# Patient Record
Sex: FEMALE | URBAN - METROPOLITAN AREA
[De-identification: names, ages, dates, MRNs, and addresses within clinical notes are randomized per-mention and may not be internally consistent; named-entity substitution may affect disease eponyms.]

---

## 2021-04-15 ENCOUNTER — APPOINTMENT (RX ONLY)
Dept: URBAN - METROPOLITAN AREA CLINIC 115 | Facility: CLINIC | Age: 51
Setting detail: DERMATOLOGY
End: 2021-04-15

## 2021-04-15 VITALS — TEMPERATURE: 98.1 F

## 2021-04-15 DIAGNOSIS — Z41.9 ENCOUNTER FOR PROCEDURE FOR PURPOSES OTHER THAN REMEDYING HEALTH STATE, UNSPECIFIED: ICD-10-CM

## 2021-04-15 PROCEDURE — ? PHOTO-DOCUMENTATION

## 2021-04-15 PROCEDURE — ? DYSPORT

## 2021-04-15 PROCEDURE — ? COSMETIC CONSULTATION: DYSPORT

## 2021-05-06 ENCOUNTER — APPOINTMENT (RX ONLY)
Dept: URBAN - METROPOLITAN AREA CLINIC 106 | Facility: CLINIC | Age: 51
Setting detail: DERMATOLOGY
End: 2021-05-06

## 2021-05-06 DIAGNOSIS — Z41.9 ENCOUNTER FOR PROCEDURE FOR PURPOSES OTHER THAN REMEDYING HEALTH STATE, UNSPECIFIED: ICD-10-CM

## 2021-05-06 PROCEDURE — ? PHOTO-DOCUMENTATION

## 2021-05-06 PROCEDURE — ? DYSPORT

## 2021-05-06 NOTE — PROCEDURE: DYSPORT
Left Periorbital Units: 0
Show Masseter Units: Yes
Show Ucl Units: No
Consent: Written consent obtained. Risks include but not limited to lid/brow ptosis, bruising, swelling, diplopia, temporary effect, incomplete chemical denervation.
Glabellar Complex Units: 3
Post-Care Instructions: Patient instructed to not lie down for 4 hours and limit physical activity for 24 hours.
Expiration Date (Month Year): 08/31/2021
Detail Level: Detailed
Lot #: [id number]
Dilution (U/ 0.1cc): 12
Forehead Units: 4

## 2022-11-07 ENCOUNTER — OFFICE VISIT (OUTPATIENT)
Dept: URGENT CARE | Facility: CLINIC | Age: 52
End: 2022-11-07
Payer: COMMERCIAL

## 2022-11-07 VITALS
SYSTOLIC BLOOD PRESSURE: 120 MMHG | BODY MASS INDEX: 21.62 KG/M2 | DIASTOLIC BLOOD PRESSURE: 78 MMHG | OXYGEN SATURATION: 98 % | WEIGHT: 122 LBS | HEART RATE: 83 BPM | HEIGHT: 63 IN | TEMPERATURE: 98 F | RESPIRATION RATE: 14 BRPM

## 2022-11-07 DIAGNOSIS — J01.00 ACUTE MAXILLARY SINUSITIS, RECURRENCE NOT SPECIFIED: Primary | ICD-10-CM

## 2022-11-07 DIAGNOSIS — J02.9 SORE THROAT: ICD-10-CM

## 2022-11-07 LAB
CTP QC/QA: YES
MOLECULAR STREP A: NEGATIVE

## 2022-11-07 PROCEDURE — 3078F DIAST BP <80 MM HG: CPT | Mod: CPTII,S$GLB,,

## 2022-11-07 PROCEDURE — 1160F PR REVIEW ALL MEDS BY PRESCRIBER/CLIN PHARMACIST DOCUMENTED: ICD-10-PCS | Mod: CPTII,S$GLB,,

## 2022-11-07 PROCEDURE — 1160F RVW MEDS BY RX/DR IN RCRD: CPT | Mod: CPTII,S$GLB,,

## 2022-11-07 PROCEDURE — 3008F PR BODY MASS INDEX (BMI) DOCUMENTED: ICD-10-PCS | Mod: CPTII,S$GLB,,

## 2022-11-07 PROCEDURE — 99203 OFFICE O/P NEW LOW 30 MIN: CPT | Mod: S$GLB,,,

## 2022-11-07 PROCEDURE — 1159F MED LIST DOCD IN RCRD: CPT | Mod: CPTII,S$GLB,,

## 2022-11-07 PROCEDURE — 87651 STREP A DNA AMP PROBE: CPT | Mod: QW,S$GLB,,

## 2022-11-07 PROCEDURE — 1159F PR MEDICATION LIST DOCUMENTED IN MEDICAL RECORD: ICD-10-PCS | Mod: CPTII,S$GLB,,

## 2022-11-07 PROCEDURE — 99203 PR OFFICE/OUTPT VISIT, NEW, LEVL III, 30-44 MIN: ICD-10-PCS | Mod: S$GLB,,,

## 2022-11-07 PROCEDURE — 3074F PR MOST RECENT SYSTOLIC BLOOD PRESSURE < 130 MM HG: ICD-10-PCS | Mod: CPTII,S$GLB,,

## 2022-11-07 PROCEDURE — 3074F SYST BP LT 130 MM HG: CPT | Mod: CPTII,S$GLB,,

## 2022-11-07 PROCEDURE — 3078F PR MOST RECENT DIASTOLIC BLOOD PRESSURE < 80 MM HG: ICD-10-PCS | Mod: CPTII,S$GLB,,

## 2022-11-07 PROCEDURE — 3008F BODY MASS INDEX DOCD: CPT | Mod: CPTII,S$GLB,,

## 2022-11-07 PROCEDURE — 87651 POCT STREP A MOLECULAR: ICD-10-PCS | Mod: QW,S$GLB,,

## 2022-11-07 RX ORDER — ESTRADIOL 0.1 MG/D
FILM, EXTENDED RELEASE TRANSDERMAL
COMMUNITY
End: 2023-04-13

## 2022-11-07 RX ORDER — BUPROPION HYDROCHLORIDE 75 MG/1
75 TABLET ORAL 2 TIMES DAILY
COMMUNITY
End: 2023-03-03 | Stop reason: SDUPTHER

## 2022-11-07 RX ORDER — ALPRAZOLAM 0.25 MG/1
TABLET ORAL 3 TIMES DAILY
COMMUNITY
End: 2023-03-03 | Stop reason: SDUPTHER

## 2022-11-07 NOTE — PROGRESS NOTES
"Subjective:       Patient ID: Yanelis Lundy is a 52 y.o. female.    Vitals:  height is 5' 3" (1.6 m) and weight is 55.3 kg (122 lb). Her oral temperature is 97.9 °F (36.6 °C). Her blood pressure is 120/78 and her pulse is 83. Her respiration is 14 and oxygen saturation is 98%.     Chief Complaint: Sore Throat    Patient c/o sore throat, sinus pressure, and cough with yellowish and greenish mucus. Patient states that she has been having sinus pressure and a productive cough with clear production for a week. She also states have post nasal drainage as well. She states that yesterday she noticed that her mucus and cough has become greenish/yellow. She states that she developed a sore throat yesterday and has a history of strep throat and wanted to come in and get tested for strep. She states that she had a fever last night of 102. Patient states that she has been taking Tylenol and Mucinex for her fever, sore throat and her cough. Patient states having tightness within her muscles but mentioned that this is normal for her when she is sick. Patient denies CP, SOB, nausea, vomiting, and diarrhea.     Sore Throat   This is a new problem. The current episode started 1 to 4 weeks ago. The problem has been gradually worsening. The maximum temperature recorded prior to her arrival was 102 - 102.9 F. The pain is at a severity of 3/10. The pain is mild. Associated symptoms include congestion, coughing, diarrhea and trouble swallowing. Pertinent negatives include no abdominal pain, ear discharge, ear pain, headaches, hoarse voice, neck pain, shortness of breath, stridor or vomiting. Treatments tried: Tylenol. The treatment provided mild relief.     Constitution: Negative for chills, sweating and fever.   HENT:  Positive for congestion, postnasal drip, sinus pain, sinus pressure, sore throat and trouble swallowing. Negative for ear pain, ear discharge, hearing loss, facial swelling and nosebleeds.    Neck: Negative for neck " pain, neck stiffness and neck swelling.   Cardiovascular:  Negative for chest pain and sob on exertion.   Eyes:  Negative for eye itching, eye pain and eye redness.   Respiratory:  Positive for cough and sputum production (originally clear, now yellow/green). Negative for bloody sputum, COPD, shortness of breath, stridor and wheezing.    Gastrointestinal:  Positive for nausea and diarrhea. Negative for abdominal pain, vomiting and constipation.   Musculoskeletal:  Negative for muscle cramps and muscle ache.   Skin:  Negative for rash.   Allergic/Immunologic: Negative for sneezing.   Neurological:  Negative for dizziness, light-headedness and headaches.     Objective:      Physical Exam   Constitutional: She is oriented to person, place, and time. She appears well-developed. She is cooperative.  Non-toxic appearance. She does not appear ill. No distress.   HENT:   Head: Normocephalic and atraumatic.   Ears:   Right Ear: Hearing, tympanic membrane, external ear and ear canal normal.   Left Ear: Hearing, tympanic membrane, external ear and ear canal normal.   Nose: Sinus tenderness and congestion present. No mucosal edema, rhinorrhea, purulent discharge, nose lacerations, nasal deformity, septal deviation or nasal septal hematoma. No epistaxis. Right sinus exhibits no maxillary sinus tenderness and no frontal sinus tenderness. Left sinus exhibits no maxillary sinus tenderness and no frontal sinus tenderness.   Mouth/Throat: Uvula is midline, oropharynx is clear and moist and mucous membranes are normal. Mucous membranes are moist. No trismus in the jaw. Normal dentition. No uvula swelling. No oropharyngeal exudate or posterior oropharyngeal erythema. Oropharynx is clear.   Eyes: Conjunctivae and lids are normal. Pupils are equal, round, and reactive to light. Right eye exhibits no discharge. Left eye exhibits no discharge. No scleral icterus. Extraocular movement intact   Neck: Trachea normal and phonation normal. Neck  supple. No neck rigidity present.   Cardiovascular: Normal rate, regular rhythm, S1 normal, S2 normal, normal heart sounds and normal pulses.   No murmur heard.Exam reveals no gallop and no friction rub.   Pulmonary/Chest: Effort normal and breath sounds normal. No stridor. No respiratory distress. She has no wheezes. She has no rhonchi. She has no rales.   Abdominal: Normal appearance and bowel sounds are normal. She exhibits no distension and no mass. Soft. There is no abdominal tenderness.   Musculoskeletal: Normal range of motion.         General: No deformity. Normal range of motion.      Cervical back: She exhibits no tenderness.   Lymphadenopathy:     She has cervical adenopathy.   Neurological: She is alert and oriented to person, place, and time. She exhibits normal muscle tone. Coordination normal.   Skin: Skin is warm, dry, intact, not diaphoretic and not pale.   Psychiatric: Her speech is normal and behavior is normal. Judgment and thought content normal.   Nursing note and vitals reviewed.      Results for orders placed or performed in visit on 11/07/22   POCT Strep A, Molecular   Result Value Ref Range    Molecular Strep A, POC Negative Negative     Acceptable Yes        Assessment:       1. Acute maxillary sinusitis, recurrence not specified    2. Sore throat            Plan:     Previous external notes reviewed.  Vital signs reviewed.  Labs ordered. Labs reviewed.  Discussed sinusitis, home care, tx options, and given follow up precautions  Patient involved with the treatment plan and agreed to the plan.  Patient informed on warning signs, patient understood warning signs.  Patient informed to return to the urgent care or go to the ER if warning signs appear.    Patient Instructions   Please drink plenty of fluids.  Please get plenty of rest.  Please return here or go to the Emergency Department for any concerns or worsening of condition.  If you do not have Hypertension or any history  of palpitations, it is ok to take over the counter Sudafed or Mucinex D or Allegra-D or Claritin-D or Zyrtec-D.  If you do take one of the above, it is ok to combine that with plain over the counter Mucinex or Allegra or Claritin or Zyrtec.  If for example you are taking Zyrtec -D, you can combine that with Mucinex, but not Mucinex-D.  If you are taking Mucinex-D, you can combine that with plain Allegra or Claritin or Zyrtec.   If you do have Hypertension or palpitations, it is safe to take Coricidin HBP for relief of sinus symptoms.  We recommend you take over the counter Flonase (Fluticasone) or another nasally inhaled steroid unless you are already taking one.  Nasal irrigation with a saline spray or Netti Pot like device per their directions is also recommended.  If not allergic, please take over the counter Tylenol (Acetaminophen) and/or Motrin (Ibuprofen) as directed for control of pain and/or fever.  Please follow up with your primary care doctor or specialist as needed.    If you  smoke, please stop smoking.      Acute maxillary sinusitis, recurrence not specified    Sore throat  -     POCT Strep A, Molecular            Additional MDM:     Heart Failure Score:   COPD = No      Arlen Marmolejo PA-C

## 2022-11-07 NOTE — LETTER
November 7, 2022      [facility] [place] [address] [facility] [address]  Phone: [phone]  Fax: [phone]       Patient: Yanelis Lundy   YOB: 1970  Date of Visit: 11/07/2022    To Whom It May Concern:    DARCIE Lundy  was at [facility] on 11/07/2022. The patient may return to work/school on 11/11/2022 with no restrictions or sooner if fever (100.4) free for 24 hours without the use of fever reducing medications. If you have any questions or concerns, or if I can be of further assistance, please do not hesitate to contact me.    Sincerely,      Arlen Marmolejo PA-C

## 2023-01-10 ENCOUNTER — TELEPHONE (OUTPATIENT)
Dept: NEUROLOGY | Facility: CLINIC | Age: 53
End: 2023-01-10
Payer: COMMERCIAL

## 2023-01-10 DIAGNOSIS — F07.81 POST-CONCUSSION SYNDROME: Primary | ICD-10-CM

## 2023-01-10 NOTE — TELEPHONE ENCOUNTER
----- Message from Merlin Trevizo MA sent at 1/10/2023  9:40 AM CST -----  Regarding: RETURNING CALL  Contact: pt at   Pt  is  calling stated that she is returning  carrie's call for an appt . Please call pt at
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
## 2023-01-19 ENCOUNTER — PATIENT MESSAGE (OUTPATIENT)
Dept: OTOLARYNGOLOGY | Facility: CLINIC | Age: 53
End: 2023-01-19
Payer: COMMERCIAL

## 2023-01-19 ENCOUNTER — CLINICAL SUPPORT (OUTPATIENT)
Dept: REHABILITATION | Facility: HOSPITAL | Age: 53
End: 2023-01-19
Attending: PSYCHIATRY & NEUROLOGY
Payer: COMMERCIAL

## 2023-01-19 ENCOUNTER — OFFICE VISIT (OUTPATIENT)
Dept: NEUROLOGY | Facility: CLINIC | Age: 53
End: 2023-01-19
Payer: COMMERCIAL

## 2023-01-19 ENCOUNTER — HOSPITAL ENCOUNTER (OUTPATIENT)
Dept: RADIOLOGY | Facility: HOSPITAL | Age: 53
Discharge: HOME | End: 2023-01-19
Attending: PSYCHIATRY & NEUROLOGY
Payer: COMMERCIAL

## 2023-01-19 ENCOUNTER — TELEPHONE (OUTPATIENT)
Dept: OTOLARYNGOLOGY | Facility: CLINIC | Age: 53
End: 2023-01-19
Payer: COMMERCIAL

## 2023-01-19 VITALS
WEIGHT: 136.69 LBS | HEART RATE: 75 BPM | SYSTOLIC BLOOD PRESSURE: 131 MMHG | DIASTOLIC BLOOD PRESSURE: 81 MMHG | HEIGHT: 63 IN | BODY MASS INDEX: 24.22 KG/M2

## 2023-01-19 DIAGNOSIS — H81.90 VESTIBULOPATHY, UNSPECIFIED LATERALITY: ICD-10-CM

## 2023-01-19 DIAGNOSIS — G44.86 CERVICOGENIC HEADACHE: ICD-10-CM

## 2023-01-19 DIAGNOSIS — S09.90XA HEAD TRAUMA, INITIAL ENCOUNTER: ICD-10-CM

## 2023-01-19 DIAGNOSIS — G43.901 STATUS MIGRAINOSUS: ICD-10-CM

## 2023-01-19 DIAGNOSIS — F43.10 PTSD (POST-TRAUMATIC STRESS DISORDER): ICD-10-CM

## 2023-01-19 DIAGNOSIS — G44.309 POST-CONCUSSION HEADACHE: Primary | ICD-10-CM

## 2023-01-19 DIAGNOSIS — Z76.89 ENCOUNTER TO ESTABLISH CARE: ICD-10-CM

## 2023-01-19 DIAGNOSIS — G43.709 CHRONIC MIGRAINE WITHOUT AURA WITHOUT STATUS MIGRAINOSUS, NOT INTRACTABLE: ICD-10-CM

## 2023-01-19 DIAGNOSIS — H53.9 VISION ABNORMALITIES: ICD-10-CM

## 2023-01-19 DIAGNOSIS — G44.329 CHRONIC POST-TRAUMATIC HEADACHE, NOT INTRACTABLE: ICD-10-CM

## 2023-01-19 DIAGNOSIS — F07.81 POST-CONCUSSION SYNDROME: Primary | ICD-10-CM

## 2023-01-19 DIAGNOSIS — G31.89 COGNITIVE AND NEUROBEHAVIORAL DYSFUNCTION FOLLOWING BRAIN INJURY: ICD-10-CM

## 2023-01-19 DIAGNOSIS — T75.09XS ACCIDENT CAUSED BY LIGHTNING, SEQUELA: ICD-10-CM

## 2023-01-19 DIAGNOSIS — F07.81 POST-CONCUSSION SYNDROME: ICD-10-CM

## 2023-01-19 DIAGNOSIS — G44.309 POST-CONCUSSION HEADACHE: ICD-10-CM

## 2023-01-19 DIAGNOSIS — R29.898 DECREASED ROM OF NECK: ICD-10-CM

## 2023-01-19 DIAGNOSIS — F07.81 POST CONCUSSION SYNDROME: Primary | ICD-10-CM

## 2023-01-19 DIAGNOSIS — H51.9 CONVERGENCE INSUFFICIENCY OR PALSY IN BINOCULAR EYE MOVEMENT: ICD-10-CM

## 2023-01-19 DIAGNOSIS — Z74.09 IMPAIRED FUNCTIONAL MOBILITY, BALANCE, AND ENDURANCE: ICD-10-CM

## 2023-01-19 DIAGNOSIS — F09 COGNITIVE AND NEUROBEHAVIORAL DYSFUNCTION FOLLOWING BRAIN INJURY: ICD-10-CM

## 2023-01-19 DIAGNOSIS — Z87.820 HISTORY OF TRAUMATIC BRAIN INJURY: ICD-10-CM

## 2023-01-19 DIAGNOSIS — S06.9XAS COGNITIVE AND NEUROBEHAVIORAL DYSFUNCTION FOLLOWING BRAIN INJURY: ICD-10-CM

## 2023-01-19 PROCEDURE — 97162 PT EVAL MOD COMPLEX 30 MIN: CPT | Performed by: PHYSICAL THERAPIST

## 2023-01-19 PROCEDURE — 3079F PR MOST RECENT DIASTOLIC BLOOD PRESSURE 80-89 MM HG: ICD-10-PCS | Mod: CPTII,S$GLB,, | Performed by: PSYCHIATRY & NEUROLOGY

## 2023-01-19 PROCEDURE — 97166 OT EVAL MOD COMPLEX 45 MIN: CPT

## 2023-01-19 PROCEDURE — 3079F DIAST BP 80-89 MM HG: CPT | Mod: CPTII,S$GLB,, | Performed by: PSYCHIATRY & NEUROLOGY

## 2023-01-19 PROCEDURE — 3008F PR BODY MASS INDEX (BMI) DOCUMENTED: ICD-10-PCS | Mod: CPTII,S$GLB,, | Performed by: PSYCHIATRY & NEUROLOGY

## 2023-01-19 PROCEDURE — 3075F SYST BP GE 130 - 139MM HG: CPT | Mod: CPTII,S$GLB,, | Performed by: PSYCHIATRY & NEUROLOGY

## 2023-01-19 PROCEDURE — 3008F BODY MASS INDEX DOCD: CPT | Mod: CPTII,S$GLB,, | Performed by: PSYCHIATRY & NEUROLOGY

## 2023-01-19 PROCEDURE — 72052 X-RAY EXAM NECK SPINE 6/>VWS: CPT | Mod: 26,,, | Performed by: RADIOLOGY

## 2023-01-19 PROCEDURE — 72052 XR CERVICAL SPINE 5 VIEW WITH FLEX AND EXT: ICD-10-PCS | Mod: 26,,, | Performed by: RADIOLOGY

## 2023-01-19 PROCEDURE — 99999 PR PBB SHADOW E&M-EST. PATIENT-LVL IV: ICD-10-PCS | Mod: PBBFAC,,,

## 2023-01-19 PROCEDURE — 99999 PR PBB SHADOW E&M-EST. PATIENT-LVL IV: CPT | Mod: PBBFAC,,,

## 2023-01-19 PROCEDURE — 72052 X-RAY EXAM NECK SPINE 6/>VWS: CPT | Mod: TC

## 2023-01-19 PROCEDURE — 96125 COGNITIVE TEST BY HC PRO: CPT | Mod: 59

## 2023-01-19 PROCEDURE — 99205 OFFICE O/P NEW HI 60 MIN: CPT | Mod: S$GLB,,, | Performed by: PSYCHIATRY & NEUROLOGY

## 2023-01-19 PROCEDURE — 99205 PR OFFICE/OUTPT VISIT, NEW, LEVL V, 60-74 MIN: ICD-10-PCS | Mod: S$GLB,,, | Performed by: PSYCHIATRY & NEUROLOGY

## 2023-01-19 PROCEDURE — 97110 THERAPEUTIC EXERCISES: CPT | Performed by: PHYSICAL THERAPIST

## 2023-01-19 PROCEDURE — 3075F PR MOST RECENT SYSTOLIC BLOOD PRESS GE 130-139MM HG: ICD-10-PCS | Mod: CPTII,S$GLB,, | Performed by: PSYCHIATRY & NEUROLOGY

## 2023-01-19 RX ORDER — VENLAFAXINE HYDROCHLORIDE 37.5 MG/1
37.5 CAPSULE, EXTENDED RELEASE ORAL DAILY
Qty: 30 CAPSULE | Refills: 6 | Status: SHIPPED | OUTPATIENT
Start: 2023-01-19 | End: 2023-03-03 | Stop reason: ALTCHOICE

## 2023-01-19 RX ORDER — ESTRADIOL 0.07 MG/D
1 FILM, EXTENDED RELEASE TRANSDERMAL
COMMUNITY
End: 2023-04-13

## 2023-01-19 NOTE — PROGRESS NOTES
See plan of care for physical therapy evaluation    Yanelis Grover, PT, DPT,   Board-Certified Clinical Specialist in Neurologic Physical Therapy   Certified Brain Injury Specialist

## 2023-01-19 NOTE — PROGRESS NOTES
Please see initial plan of care for evaluation details.     LAURITA Serrano, CCC-SLP  Speech Language Pathologist   1/19/2023     .
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
## 2023-01-19 NOTE — PATIENT INSTRUCTIONS
Perform in horizontal, vertical, and diagonal directions. 2 x 30 seconds each. Move target slowly, keep head still, and track with eyes. 30 bpm on metronome.       Perform in horizontal, vertical, and diagonal directions. 2 x 30 seconds each. Move eyes between targets as fast as you can that they remain in focus, keep head still.       2 x 10. Hold near point for 5 seconds.     Retrieved from:      Symptoms should not exceed 2-3 levels from baseline and difficulty should not exceed high mild/low moderate difficulty level. If so, decrease time exercise is performed, slow movements, or decrease reps.

## 2023-01-20 ENCOUNTER — PATIENT MESSAGE (OUTPATIENT)
Dept: NEUROLOGY | Facility: CLINIC | Age: 53
End: 2023-01-20
Payer: COMMERCIAL

## 2023-01-28 ENCOUNTER — HOSPITAL ENCOUNTER (OUTPATIENT)
Dept: RADIOLOGY | Facility: OTHER | Age: 53
Discharge: HOME | End: 2023-01-28
Attending: PSYCHIATRY & NEUROLOGY
Payer: COMMERCIAL

## 2023-01-28 DIAGNOSIS — S09.90XA HEAD TRAUMA, INITIAL ENCOUNTER: ICD-10-CM

## 2023-01-28 DIAGNOSIS — Z87.820 HISTORY OF TRAUMATIC BRAIN INJURY: ICD-10-CM

## 2023-01-28 PROCEDURE — 70551 MRI BRAIN STEM W/O DYE: CPT | Mod: 26,,, | Performed by: RADIOLOGY

## 2023-01-28 PROCEDURE — 70551 MRI BRAIN STEM W/O DYE: CPT | Mod: TC

## 2023-01-28 PROCEDURE — 70551 MRI BRAIN WITHOUT CONTRAST: ICD-10-PCS | Mod: 26,,, | Performed by: RADIOLOGY

## 2023-01-31 ENCOUNTER — PATIENT MESSAGE (OUTPATIENT)
Dept: NEUROLOGY | Facility: CLINIC | Age: 53
End: 2023-01-31
Payer: COMMERCIAL

## 2023-02-01 NOTE — PROGRESS NOTES
[facility]  Speech Therapy Treatment Note- Neurological Rehabilitation  Date: 2/3/2023     Name: Yanelis Lundy   MRN: [id number]   Therapy Diagnosis:   Encounter Diagnosis   Name Primary?    Post concussion syndrome Yes   Physician: Wei Lin III, MD  Physician Orders: Ambulatory Referral to Speech Therapy   Medical Diagnosis: Post-Concussion Syndrome    Visit #/ Visits Authorized: 1/ 20  Date of Evaluation:  1/19/2023  Insurance Authorization Period: 1/19/2023 - 12/9/2024  Plan of Care Expiration Date:    3/16/2023  Extended Plan of Care:  n/a   Progress Note: 2/19/2023   Visits Cancelled: 0  Visits No Show: 0    Time In:  1:30  Time Out:  2:15  Total Billable Time: 45     Precautions: Standard  Subjective:   Patient reports: Patient tearful and expressed frustration with current deficits.   She was compliant to home exercise program.   Response to previous treatment: none   Pain Scale:  4/10 on a Visual Analog Scale currently.   Pain Location: headache  Objective:   TIMED  Procedure Min.   Cognitive Therapeutic Interventions, first 15 minutes CPT 56639  15   Cognitive Therapeutic Interventions, each additional 15 minutes CPT 40419  30           Total Timed Units: 3  Total Untimed Units: 1  Charges Billed/Number of units: 3    Short Term Goals: (4 weeks) Current Progress:   Patient will complete selective attention tasks (auditory or visual) with 90% accuracy independently increase selective attention.    Progressing/ Not Met 2/3/2023   Not addressed in today's session.      2. Patient will sustain attention to complete moderate to complex reasoning tasks for 2 minutes with one request for clarification to increase sustained attention.     Progressing/ Not Met 2/3/2023   Patient was able to sustain attention throughout session and able to recall Goal-Plan-Action-Review for end of session (track fatigue/spoons)   3. Patient will use Goal Plan Action Review strategy to complete moderate to  complex reasoning, planning, or organization tasks with 90% accuracy independently to improve functional executive function skills.     Progressing/ Not Met 2/3/2023   Goal- Track cognitive fatigue  Plan- Write tracking in planner  Action- immediately  Review-  Next session   4. Patient will complete short term recall tasks after a 5 minutes delay with 90% accuracy  independently  with use of memory strategies to improve recall of information and generalization of memory strategies.     Progressing/ Not Met 2/3/2023   Not addressed in today's session.      5. Patient will independently implement cognitive/sensory rest periods throughout day after identifying cognitive fatigue including limiting sensory input (sound, light, etc.)      Progressing/ Not Met 2/3/2023   Not addressed in today's session.      6. Patient will identify two solutions to functional daily problems with thought flexibility and minimal assistance for use of strategies.      Progressing/ Not Met 2/3/2023   Not addressed in today's session.      7. Patient will complete a task to improve attention and memory (I.e. sample bill paying activity, recipe, or multiple choice comprehension questions to 1 paragraphs) with 80% accuracy and natural environment noise distractions (TV news background, music, etc.).      Progressing/ Not Met 2/3/2023   Not addressed in today's session.        Patient Education/Response:   Patient educated regarding the followin. Patient introduced to spoon theory  2. Discussed importance of utilizing planner and tracking fatigue      Patient verbalized understanding to all above education provided.             Home program established:  Not yet established  Exercises were reviewed and MARY was able to demonstrate them prior to the end of the session.  MARY demonstrated fair  understanding of the education provided.     See Electronic Medical Record under Patient Instructions for exercises provided throughout  therapy.  Assessment:   MARY is progressing well towards her goals. Patient appeared oiverwhelmed. She later became tearful given frustration with current deficits. SLP counseled and reassured patient that she is in the right place to get help with deficits. SLP provided patient with education on spoon theory and set Goal-Plan-Action-Review that will be reviewed in next ST session. Current goals remain appropriate. Goals to be updated as necessary.     Patient prognosis is Fair. Patient will continue to benefit from skilled outpatient speech and language therapy to address the deficits listed in the problem list on initial evaluation, provide patient/family education and to maximize patient's level of independence in the home and community environment.   Medical necessity is demonstrated by the following IMPAIRMENTS:  Deficits in executive functioning, attention, and memory prevent the patient performing herwork and personal daily activities effectively and efficiently which may put her at risk of unsafe behavior and a decline in quality of life.   Barriers to Therapy: none  Patient's spiritual, cultural and educational needs considered and patient agreeable to plan of care and goals.  Plan:   Continue Plan of Care with focus on cognitive communication deficits.    SABINA Connelly-SLP   2/3/2023

## 2023-02-03 ENCOUNTER — CLINICAL SUPPORT (OUTPATIENT)
Dept: REHABILITATION | Facility: HOSPITAL | Age: 53
End: 2023-02-03
Payer: COMMERCIAL

## 2023-02-03 DIAGNOSIS — G44.309 POST-CONCUSSION HEADACHE: ICD-10-CM

## 2023-02-03 DIAGNOSIS — Z74.09 IMPAIRED FUNCTIONAL MOBILITY, BALANCE, AND ENDURANCE: ICD-10-CM

## 2023-02-03 DIAGNOSIS — R29.898 DECREASED ROM OF NECK: Primary | ICD-10-CM

## 2023-02-03 DIAGNOSIS — F07.81 POST CONCUSSION SYNDROME: Primary | ICD-10-CM

## 2023-02-03 DIAGNOSIS — H53.9 VISION ABNORMALITIES: ICD-10-CM

## 2023-02-03 PROCEDURE — 97530 THERAPEUTIC ACTIVITIES: CPT | Mod: PO

## 2023-02-03 PROCEDURE — 97112 NEUROMUSCULAR REEDUCATION: CPT | Mod: PO

## 2023-02-03 PROCEDURE — 97129 THER IVNTJ 1ST 15 MIN: CPT | Mod: PO

## 2023-02-03 PROCEDURE — 97130 THER IVNTJ EA ADDL 15 MIN: CPT | Mod: PO

## 2023-02-03 NOTE — PROGRESS NOTES
"  [facility]   Occupational Therapy Neurological Rehabilitation   Treatment Note   Name: Yanelis Lundy  MRN: [id number]  Today's Date: 2/3/2023    Therapy Diagnosis:   Encounter Diagnoses   Name Primary?    Post concussion syndrome Yes    Vision abnormalities     Post-concussion headache      Physician: Wei Lin III, MD  Physician Orders: Eval and Treat - oculomotor rehab   Medical Diagnosis: F07.81 (ICD-10-CM) - Post-concussion syndrome  Evaluation Date: 1/19/2023  Plan of Care Expiration Period: 10 visits; around 3/30/2022  Insurance Authorization period Expiration: 12/29/2023  Date of Return to MD: 4/13/2023 Dr. Delia ROSARIO: not administered, unavailable at concussion clinic     Visit # / Visits Authorized: 1 / 20 (+initial eval)  Preferred Provider     Time In:3:20  Time Out: 4:10  Total Billable Time: 50 minutes    Precautions:   Standard and Fall    Subjective   Pt reported she feels like she hasn't gotten better but maybe a little worse since last seen in clinic   she was compliant with home exercise program given last session.   Response to previous treatment:the Friday following concussion clinic, she had an increase in her HA and left shoulder pain- believes in regards to maneuver   Functional change: reports everything is worse since clinic   Patient's Goals for Therapy: "find someone that can understand all the things I say"     Pain upon arrival: 6/10  Location: headache (background music in gym area with PT session)  Pain at cessation of session: 6/10    Objective     YANELIS participated in neuromuscular re-education activities to improve: oculomotor control for 30 minutes. The following activities were included:  -seated private treatment room with lights off / door open with hallway light and small lamp light (glasses doffed)  Pencil push ups x1 set of 8 reps - blue eraser   Connor's string set 13 cm apart - completed x1 min duration   Smooth pursuits, " horizontal/vertical/diagonal directions- self selected speed x45 s  Saccades, horizontal/vertical/diagonal directions- self selected speed x45 s    MARY participated in dynamic functional therapeutic activities to improve functional performance for 20  minutes, including:  -therapeutic listening provided and rest breaks as needed  -Education provided as seen below  -Schedule sheet provided   Home Exercises and Education Provided   Education provided:   - discussed external headphones for parades   - ocular palming for relaxation with eye fatigue and strain   - if exercises exacerbate symptoms greater than 3 levels past baseline or exceed high mild/low moderate level of difficulty to slow down eye/head movements or decrease time exercises are being performed  - role of Occupational Therapy in care, goals for Occupational Therapy for this plan of care,  scheduling  - Progress towards goals     Written Home Exercises Provided: yes.  Exercises were reviewed and MARY was able to demonstrate them prior to the end of the session.    MARY demonstrated good  understanding of the education provided.      See EMR under Patient Instructions for exercises provided 1/19/2023.     Assessment   Mary was seen today following PT and speech language pathology sessions with increased fatigue noted. Discussed with follow up appointments, to have autonomy over scheduling and to schedule at her better times for increased participation. Pt to send her opthalmology report. With pencil push ups, pt with increased difficulty with divergence; therefore, Connor's string utilized for convergence and divergence task. Pt with smooth saccades in vertical planes; however, with increased jerkiness with horizontal and diagonal movement.  Re-printed out exercises provided on evaluation.    MARY is progressing well towards her goals and there are no updates to goals at this time.   Patient prognosis is Guarded due to history of 4  concussions and underlying visual deficits     Pt will continue to benefit from skilled outpatient occupational therapy to address the deficits listed in the problem list on initial evaluation provide pt/family education and to maximize pt's level of independence in the home and community environment.     Anticipated barriers to occupational therapy: history of 4 concussions and underlying visual deficits     Pt's spiritual, cultural and educational needs considered and pt agreeable to plan of care and goals.    Goals:  The following goals were discussed with the patient and patient is in agreement with them as to be addressed in the treatment plan.      Short Term Goals: 5 weeks   1) Pt will tolerate oculomotor and/or habitation home exercise/activity program, reporting at least 50% compliance. Ongoing   2) Pt will pace work tasks reported decreased post concussion headache to < / = 4/10 no more than 5x/wk. Ongoing  3) Pt will verbalize eye ergonomics to decrease stress on eyes.Ongoing  4) Near point convergence will decrease to 15 cm or less to improve oculomotor coordination and ability to fixate on close up work. Ongoing  5) Pt will demonstrate ability to track single target WFL, in all directions, without onset of headache or dizziness, to improve skills needed for technology use and scanning environment.Ongoing  6) Pt to perform saccades WFL, in all directions, without onset of headache or nausea, to improve skills needed for reading. Ongoing  7) Pt to leisurely read for 15 minutes without eye fatigue, double vision, or increase in headache. Ongoing  8) Pt to performed seated bending tasks and head turns/VOR with little to no onset of dizziness.Ongoing  9) Pt to perform oculomotor exercises facing busy room, using busy card to decrease sensitivity to busy environments. Ongoing     Long Term Goals: 10 weeks   1) Pt will be independent with oculomotor and/or habituation home exercise/activity program.Ongoing  2)  Pt will participate in work tasks reporting decreased post concussion headache to < / = 2/10 no more than 3x/wk using pacing strategies as needed. Ongoing  3) Near point convergence will decrease to 10 cm or less to improve oculomotor coordination and ability to fixate on close up work. Ongoing  4) Pt will change gaze between near and far targets without onset of headache or dizziness to improve accommodative flexibility, saccadic eye movements, and oculomotor coordination needed for work, participation in ballet, and to maximize safety while driving.Ongoing  5) Pt to leisurely read for 30 minutes without eye fatigue, double vision, or increase in headache.Ongoing  6) Pt will demonstrate ability to fixate/attend to close up/distance task x 30 minutes without onset of headache, dizziness, or eye fatigue. Ongoing  7) Pt will perform home making tasks such as laundry, cleaning, and cooking with little to no complaints of dizziness.Ongoing  8) Pt will be able to engage in busy/loud environment (such as the grocery store/mall) for 30 minutes to increase ability to participate in community mobility and social outings.Ongoing    Plan   Certification Period/Plan of care expiration: 10 visits; 1/19/2023 to around 3/30/2023.     Outpatient Occupational Therapy 1 times weekly for 10 weeks to include the following interventions: Neuromuscular Re-ed, Patient Education, Self Care, Therapeutic Activities, and Therapeutic Exercise.    Updates/Grading for next session: progression in exercises     ALFREDO Augustin  Neuro Occupational Therapist   [facility]   2/3/2023
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
## 2023-02-08 ENCOUNTER — OFFICE VISIT (OUTPATIENT)
Dept: NEUROLOGY | Facility: CLINIC | Age: 53
End: 2023-02-08
Payer: COMMERCIAL

## 2023-02-08 DIAGNOSIS — F07.81 POST CONCUSSION SYNDROME: Primary | ICD-10-CM

## 2023-02-08 DIAGNOSIS — F43.10 PTSD (POST-TRAUMATIC STRESS DISORDER): ICD-10-CM

## 2023-02-08 DIAGNOSIS — F41.9 ANXIETY: ICD-10-CM

## 2023-02-08 PROCEDURE — 90791 PR PSYCHIATRIC DIAGNOSTIC EVALUATION: ICD-10-PCS | Mod: 95,,, | Performed by: CLINICAL NEUROPSYCHOLOGIST

## 2023-02-08 PROCEDURE — 99499 UNLISTED E&M SERVICE: CPT | Mod: 95,,, | Performed by: CLINICAL NEUROPSYCHOLOGIST

## 2023-02-08 PROCEDURE — 90791 PSYCH DIAGNOSTIC EVALUATION: CPT | Mod: 95,,, | Performed by: CLINICAL NEUROPSYCHOLOGIST

## 2023-02-08 PROCEDURE — 99499 NO LOS: ICD-10-PCS | Mod: 95,,, | Performed by: CLINICAL NEUROPSYCHOLOGIST
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
## 2023-02-09 ENCOUNTER — PATIENT MESSAGE (OUTPATIENT)
Dept: NEUROLOGY | Facility: CLINIC | Age: 53
End: 2023-02-09

## 2023-02-09 ENCOUNTER — OFFICE VISIT (OUTPATIENT)
Dept: NEUROLOGY | Facility: CLINIC | Age: 53
End: 2023-02-09
Payer: COMMERCIAL

## 2023-02-09 ENCOUNTER — PATIENT MESSAGE (OUTPATIENT)
Dept: NEUROLOGY | Facility: CLINIC | Age: 53
End: 2023-02-09
Payer: COMMERCIAL

## 2023-02-09 DIAGNOSIS — F43.10 PTSD (POST-TRAUMATIC STRESS DISORDER): ICD-10-CM

## 2023-02-09 DIAGNOSIS — T75.09XS ACCIDENT CAUSED BY LIGHTNING, SEQUELA: ICD-10-CM

## 2023-02-09 DIAGNOSIS — F07.81 POST-CONCUSSION SYNDROME: ICD-10-CM

## 2023-02-09 PROCEDURE — 90791 PR PSYCHIATRIC DIAGNOSTIC EVALUATION: ICD-10-PCS | Mod: 95,,, | Performed by: SOCIAL WORKER

## 2023-02-09 PROCEDURE — 90791 PSYCH DIAGNOSTIC EVALUATION: CPT | Mod: 95,,, | Performed by: SOCIAL WORKER
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
## 2023-02-10 ENCOUNTER — PATIENT MESSAGE (OUTPATIENT)
Dept: NEUROLOGY | Facility: CLINIC | Age: 53
End: 2023-02-10
Payer: COMMERCIAL

## 2023-02-13 ENCOUNTER — PATIENT MESSAGE (OUTPATIENT)
Dept: NEUROLOGY | Facility: CLINIC | Age: 53
End: 2023-02-13
Payer: COMMERCIAL

## 2023-02-14 ENCOUNTER — OFFICE VISIT (OUTPATIENT)
Dept: NEUROLOGY | Facility: CLINIC | Age: 53
End: 2023-02-14
Payer: COMMERCIAL

## 2023-02-14 DIAGNOSIS — T75.09XS ACCIDENT CAUSED BY LIGHTNING, SEQUELA: ICD-10-CM

## 2023-02-14 DIAGNOSIS — F43.10 PTSD (POST-TRAUMATIC STRESS DISORDER): ICD-10-CM

## 2023-02-14 DIAGNOSIS — F32.A DEPRESSION, UNSPECIFIED DEPRESSION TYPE: ICD-10-CM

## 2023-02-14 DIAGNOSIS — F07.81 POST-CONCUSSION SYNDROME: ICD-10-CM

## 2023-02-14 DIAGNOSIS — F09 COGNITIVE AND NEUROBEHAVIORAL DYSFUNCTION FOLLOWING BRAIN INJURY: Primary | ICD-10-CM

## 2023-02-14 DIAGNOSIS — S06.9XAS COGNITIVE AND NEUROBEHAVIORAL DYSFUNCTION FOLLOWING BRAIN INJURY: Primary | ICD-10-CM

## 2023-02-14 DIAGNOSIS — G31.89 COGNITIVE AND NEUROBEHAVIORAL DYSFUNCTION FOLLOWING BRAIN INJURY: Primary | ICD-10-CM

## 2023-02-14 DIAGNOSIS — F41.9 ANXIETY: ICD-10-CM

## 2023-02-14 PROCEDURE — 96138 PSYCL/NRPSYC TECH 1ST: CPT | Mod: S$GLB,,, | Performed by: CLINICAL NEUROPSYCHOLOGIST

## 2023-02-14 PROCEDURE — 99499 NO LOS: ICD-10-PCS | Mod: S$GLB,,, | Performed by: CLINICAL NEUROPSYCHOLOGIST

## 2023-02-14 PROCEDURE — 96138 PR PSYCH/NEUROPSYCH TEST ADMIN/SCORING, BY TECH, 2+ TESTS, 1ST 30 MIN: ICD-10-PCS | Mod: S$GLB,,, | Performed by: CLINICAL NEUROPSYCHOLOGIST

## 2023-02-14 PROCEDURE — 96133 NRPSYC TST EVAL PHYS/QHP EA: CPT | Mod: S$GLB,,, | Performed by: CLINICAL NEUROPSYCHOLOGIST

## 2023-02-14 PROCEDURE — 96139 PR PSYCH/NEUROPSYCH TEST ADMIN/SCORING, BY TECH, 2+ TESTS, EA ADDTL 30 MIN: ICD-10-PCS | Mod: S$GLB,,, | Performed by: CLINICAL NEUROPSYCHOLOGIST

## 2023-02-14 PROCEDURE — 96132 PR NEUROPSYCHOLOGIC TEST EVAL SVCS, 1ST HR: ICD-10-PCS | Mod: S$GLB,,, | Performed by: CLINICAL NEUROPSYCHOLOGIST

## 2023-02-14 PROCEDURE — 96132 NRPSYC TST EVAL PHYS/QHP 1ST: CPT | Mod: S$GLB,,, | Performed by: CLINICAL NEUROPSYCHOLOGIST

## 2023-02-14 PROCEDURE — 96139 PSYCL/NRPSYC TST TECH EA: CPT | Mod: S$GLB,,, | Performed by: CLINICAL NEUROPSYCHOLOGIST

## 2023-02-14 PROCEDURE — 99999 PR PBB SHADOW E&M-EST. PATIENT-LVL II: CPT | Mod: PBBFAC,,, | Performed by: CLINICAL NEUROPSYCHOLOGIST

## 2023-02-14 PROCEDURE — 99999 PR PBB SHADOW E&M-EST. PATIENT-LVL II: ICD-10-PCS | Mod: PBBFAC,,, | Performed by: CLINICAL NEUROPSYCHOLOGIST

## 2023-02-14 PROCEDURE — 99499 UNLISTED E&M SERVICE: CPT | Mod: S$GLB,,, | Performed by: CLINICAL NEUROPSYCHOLOGIST

## 2023-02-14 PROCEDURE — 96133 PR NEUROPSYCHOLOGIC TEST EVAL SVCS, EA ADDTL HR: ICD-10-PCS | Mod: S$GLB,,, | Performed by: CLINICAL NEUROPSYCHOLOGIST

## 2023-02-14 NOTE — PROGRESS NOTES
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 years and has been living on her own since the middle of 2019. She states, "I live alone and not well"  Typical Day: "That's hard to say because so much of what I do right now is work and cook, or do one thing after work and then go to bed." Goes to [place] on the weekends to tie up loose ends.      Legal History   Current: none    OBJECTIVE:     MENTAL STATUS AND OBSERVATIONS:   Appearance: Casually dressed and adequate grooming/hygiene.   Alertness: Alert but easily distractible and required frequent prompts and redirection.    Orientation:   O x 4 across both evaluation days   Gait:  Independent    Psychomotor:  Unremarkable   Handedness:  Ambidextrous but writes with her right hand    Vision & Hearing:  Adequate for session   Speech/language: Normal in rate, rhythm, tone, and volume during the clinical interview. Seldom word finding difficulty observed during the interview. Comprehension was normal during the clinical interview. She asked for elaboration and clarification of complex task instructions to ensure her comprehension on the day of testing.    Mood/Affect:  The patients stated mood was "all over the map." Affect was congruent with stated mood. She appeared anxious on the day of " "testing and required reassurance from the examiner.    Interpersonal Behavior:  Rapport was quickly and easily established    Suicidality/Homicidality: Denied   Hallucinations/Delusions:  None evidenced or endorsed   Thought Content: Logical   Though Processes: Goal-directed for the most part, though at times scattered and tangential   Insight & Judgment:  Appropriate   Participation in Interview:  Full     PROCEDURES/TESTS ADMINISTERED: In addition to performing a review of pertinent medical records, reviewing limits to confidentiality, conducting a clinical interview, and explaining procedures, the following measures were administered by EV Freire, a trained psychometrician/psychometrist under the direct supervision of Dr. Ray: MSVT; Dot Counting (DCT); Nineveh-in-the-Hand Test; Test of Premorbid Functioning (TOPF); Wechsler Adult Intelligence Scale, Fourth Edition (WAIS-IV) [Digit Span, Arithmetic, Symbol Search, and Coding subtests]; Wechsler Memory Scale, Fourth Edition (WMS-IV) [Logical Memory subtest]; Foster Verbal Learning Test-Revised (HVLT-R; Form 1); Brief Visuospatial Memory Test-Revised (BVMT-R, form 1); Neuropsychological Assessment Battery (NAB) [Naming subtest, form 1]; Verbal fluency tests (FAS & animal naming; Macario et al., 2004 norms); Felice Complex Figure Test (RCFT) [copy only]; Trail Making Test, parts A and B (Macario et al., 2004 norms); [place] Card Sorting Test -64 card version (WCST-64); Grooved Pegboard Test (GPT, Macario et al., 2004 norms); Clarke Depression Inventory-Second Edition (BDI-2); Geriatric Depression Scale (GDS-30); Patient Health Questionnaire - 9 (PHQ-9); Generalized Anxiety Disorder - 7 Item Scale (MANUEL-7); PTSD Checklist for DSM5 (PCL-5). Manual norms were used unless otherwise indicated.      TEST TAKING BEHAVIOR AND VALIDITY: Ms. Lundy reported that she was "nervous" about testing. She requested that the examiner turn off the lights and only leave on two " "lamps. She covered her eyes with her hands and sat hunched over during the administration of most test measures. She held her hands up to shield her eyes from the glare of the computer screen/lamps whenever she needed to see visual tasks in test booklets; she did not have this difficulty on paper tasks.  She put in earplugs and stated that they help to reduce the echos she hears. She asked the examiner to speak in a slow, low voice and to flip the pages in the test booklets slowly because the motion from flipping was bothering her. One test needed to be completed on the computer, and Ms. Lundy stated that the light from the screen and the dinging from the speakers was making her feel nauseous. When she completed this measure, she stood up and walked over to the corner of the room, where she stood in the dark for a few minutes. She did this at a few other time points during the testing session. She explained that she has been "amped up" because of Jomar Gras and that she has not been sleeping. She added, "I'm trying to settle down, but it's exhausting."  She reported that she went to some of the parades but the night parades were too much for her. She said that she has friends in town and feels like she is letting them down. She commented on her appetite and stated that she wanted to "eat [her] arm off." She then followed this by stating, "I know I'm a basketcase, but I'm sure you've seen worse...my thoughts are loud and scattered." She asked the examiner to read depression and anxiety questionnaires aloud to her. She then numbered her fingers and asked the examiner to primitivo the responses on the page for her while she pointed to the finger that corresponded with the item response. She stated, "this is the only way I can do it." She often talked about her difficulty completing the various test measures, which then seemed to distract her from completing the task. She often required redirection back to the task at hand. " "On a word reading task she stated, "my eyes don't want to read" and on a novel, nonverbal problem solving task she commented that she was "just guessing." Overall, she worked at a very slow pace and her persistence varied. Scores on stand-alone and embedded performance validity measures were slightly variable, with one falling below cutoffs. Together, this information suggests that the current results may underestimate the patient's current functioning and are therefore interpreted with caution.     TEST RESULTS    Raw Score Type of Standardized Score Standardized Score Percentile/CP Descriptor   MSVT  - - - -   MSVT  - - - -   MSVT Cons 100 - - - -   MSVT  - - - -   MSVT FR 80 - - - -   Dot Counting Escore 21 - - - -   ACS LM II Rec 25 - - - -   ACS RDS 10 - - - -   HVLT-R Recognition Discrimination 12 - - - -   PREMORBID FUNCTIONING Raw Score Type of Standardized Score Standardized Score Percentile/CP Descriptor   TOPF simple dem. eFSIQ -  79 High Average   TOPF pred. eFSIQ -  66 Average   TOPF simple + pred. eFSIQ -  73 Average   LANGUAGE FUNCTIONING Raw Score Type of Standardized Score Standardized Score Percentile/CP Descriptor   TOPF Word Reading 51  70 Average   NAB Naming 29 Tscore 39 14 Low Average   FAS 24 Tscore 30 2 Below Average   Animal Naming 16 Tscore 35 7 Below Average   VISUOSPATIAL FUNCTIONING Raw Score Type of Standardized Score Standardized Score Percentile/CP Descriptor   RCFT Copy 33 - - >16 WNL   RCFT Time to Copy 387 - - 2-5 Below Average   BVMT-R Copy 11 - - - -   LEARNING & MEMORY Raw Score Type of Standardized Score Standardized Score Percentile/CP Descriptor   HVLT-R         Total Immediate (7, 8, 10) 25 Tscore 44 27 Average   Delayed Recall 10 Tscore 50 50 Average   Retention % 100 Tscore 55 69 Average   Hits 12 - - - -   False Positives 0 - - - -   Discrimination  12 Tscore 58 79 High Average   WMS-IV Subtests         LM I 24 ss 10 50 Average "   LM II 23 ss 11 63 Average   LM Recognition 25 - - 51-75 Average   BVMT-R         IR (4, 8, 10) 22 Tscore 47 38 Average   DR 7 Tscore 41 18 Low Average   Discrimination Index 6 - - >16 WNL   ATTENTION/WORKING MEMORY Raw Score Type of Standardized Score Standardized Score Percentile/CP Descriptor   WAIS-IV WMI -  50 Average   WAIS-IV Digit Span 27 ss 10 50 Average         DS Forward 10 ss 10 50 Average         DS Backward 9 ss 10 50 Average         DS Sequence 8 ss 9 37 Average         Longest Digit Forward 7 - - - -         Longest Digit Backward 5 - - - -         Longest Digit Sequence 6 - - - -   WAIS-IV Arithmetic 15 ss 10 50 Average   MENTAL PROCESSING SPEED Raw Score Type of Standardized Score Standardized Score Percentile/CP Descriptor   WAIS-IV PSI - SS 65 1 Exceptionally Low    WAIS-IV Symbol Search 15 ss 4 2 Below Average   WAIS-IV Coding 24 ss 3 1 Exceptionally Low    TMT A  76 Tscore 18 <0.1 Exceptionally Low    TMT A errors 0 - - - -   EXECUTIVE FUNCTIONING Raw Score Type of Standardized Score Standardized Score Percentile/CP Descriptor   TMT B 204 Tscore 22 0 Exceptionally Low    TMT B errors 0 - - - -   WCST-64         Total Correct 34 SS - - -   Total Errors 30 SS 74 4 Below Average   Perseverative Resp. 21 SS 74 4 Below Average   Perseverative Err. 15 SS 76 5 Below Average   Nonperseverative Err. 15 SS 74 4 Below Average   Concept. Level Response 20 SS 67 1 Exceptionally Low    Categories Completed 1 - - 6-10 Below Average   FMS 0 - - - -   Learning to Learn - - - - -   FRONTOMOTOR  Raw Score Type of Standardized Score Standardized Score Percentile/CP Descriptor   GPT DH (Right) 137 Tscore 20 0.1 Exceptionally Low    GPD  Tscore 22 0.3 Exceptionally Low    MOOD & PERSONALITY Raw Score Type of Standardized Score Standardized Score Percentile/CP Descriptor   BDI-2 29 - - - Severe   MANUEL-7 12 - - - Moderate   PCL-5 45 - - - Significant   ss = scaled score (mean = 10, SD = 3); SS = standard  score (mean = 100, SD = 15); Tscore mean = 50, SD = 10; zscore (mean = 0.00, SD = 1)  It is important to note that scores/percentiles should only be interpreted by a neuropsychologist. It is common for healthy individuals to have 1-3 isolated low/unusual scores that are not indicative of any significant cognitive dysfunction.       BILLING  Code Description Minutes Units   14875 Psychiatric Interview 0    99483 Nubhvl xm phys/qhp 1st hr 0    61892 Nubhvl xm phy/qhp ea addl hr 0    10108 Psycl tst eval phys/qhp 1st 0    15361 Psycl tst eval phys/qhp ea 0    20634 Nrpsyc tst eval phys/qhp 1st 60 1   59811 Nrpsyc tst eval phys/qhp ea 157 3     Referral review/test selection 30      Tech consult/test review/modifications 25      Patient limitation management 0      Patient behavior management 0      Patient symptom monitoring 0      Record Review/Integration/Report Generation 117      Face-to-Face interpretive Feedback 45    43465 Psycl/nrpsyc tst phy/qhp 1st 0    54821 Psycl/nrpsyc tst phy/qhp ea 0    08563 Psycl/nrpsyc tech 1st 30 1   86782 Psycl/nrpsyc tst tech ea 242 8

## 2023-02-16 ENCOUNTER — PATIENT MESSAGE (OUTPATIENT)
Dept: NEUROLOGY | Facility: CLINIC | Age: 53
End: 2023-02-16
Payer: COMMERCIAL

## 2023-02-17 ENCOUNTER — PATIENT MESSAGE (OUTPATIENT)
Dept: NEUROLOGY | Facility: CLINIC | Age: 53
End: 2023-02-17
Payer: COMMERCIAL

## 2023-02-23 ENCOUNTER — OFFICE VISIT (OUTPATIENT)
Dept: NEUROLOGY | Facility: CLINIC | Age: 53
End: 2023-02-23
Payer: COMMERCIAL

## 2023-02-23 DIAGNOSIS — F07.81 POST CONCUSSION SYNDROME: Primary | ICD-10-CM

## 2023-02-23 DIAGNOSIS — F41.9 ANXIETY: ICD-10-CM

## 2023-02-23 PROBLEM — F32.A DEPRESSION: Status: ACTIVE | Noted: 2023-02-23

## 2023-02-23 PROCEDURE — 90837 PSYTX W PT 60 MINUTES: CPT | Mod: 95,,, | Performed by: SOCIAL WORKER

## 2023-02-23 PROCEDURE — 90837 PR PSYCHOTHERAPY W/PATIENT, 60 MIN: ICD-10-PCS | Mod: 95,,, | Performed by: SOCIAL WORKER

## 2023-02-23 NOTE — PATIENT INSTRUCTIONS
TALK THERAPY/COUNSELING  In addition to medication, I believe you will benefit greatly from starting talk therapy/counseling to work through your symptoms of depression, anxiety, and PTSD. I have placed a referral for [facility]. If interested, please call [phone] to schedule. Otherwise, please visit [email or web address] to find a list of community providers with whom you may be able to work.       PRACTICE GOOD COGNITIVE HYGIENE  Engage in regular exercise, which increases alertness and arousal and can improve attention and focus.  Consider lower impact exercises, such as yoga or light walking. Try to exercise for at least 150 minutes per week  Get a good night's sleep, as this can enhance alertness and cognition.  Eat healthy foods and balanced meals. It is notable that research indicates certain nutrients may aid in brain function, such as B vitamins (especially B6, B12, and folic acid), antioxidants (such as vitamins C and E, and beta carotene), and Omega-3 fatty acids. Here are some common tips for diet (Adopted from Tristan et al, [facility], 2018):  Eat primarily plant-based foods, such as fruits and vegetables, whole grains, legumes   (beans) and nuts.  Limit refined carbohydrates (white pasta, bread, rice).  Replace butter with healthy fats such as olive oil.  Use herbs and spices instead of salt to flavor foods.  Limit red meat and processed meats to no more than a few times a month.  Avoid sugary sodas, bakery goods, and sweets.  Eat fish and poultry at least twice a week.  Keep your brain active. Find activities to stay mentally active, such as reading, games (cards, checkers), puzzles (crosswords, Sudoku, jig saw), crafts ([place], woodworking), gardening, or participating in activities in the community.  Stay socially engaged. Continue staying active with your family and friends.     RESOURCE  Consider purchasing the book, High-Octane Brain: 5 Science-Based Steps to Sharpen Your Memory and  Reduce Your Risk of Alzheimer's by Dr. Allison Espinoza.     Consider purchasing the book, Bouncing Back: Skills for Adaptation to Injury, Aging, Illness, and Pain by Taz Arceo, PhD    Consider purchasing the book, Smart but Scattered Guide to Success: How to Use Your Brain's Executive Skills to Keep Up, Stay Calm, and Get Organized at Work and at Home by Shawna Steen EdD and Taz Byrd, PhD    COGNITIVE TIPS AND STRATEGIES  The following tips and strategies are provided to help assist in daily activities:      Attention: Remember that inattention and lack of focus are major culprits to forgetting information so be sure and practice paying attention for adequate learning of information. If you rely on passive attention to remembering something (e.g., yeah, uh-huh approach), you'll find you cannot recall it later. I recommend the following to improve attention, which may aid in later recall:  1. Reduce distractions in the area as much as possible  2. Look at the person as they are speaking to you.   3. Paraphrase as they are speaking  4. Write down important pieces of information   5. Ask them to repeat if you zone out.  6. Have them simplify and reduce information that you need to attend to during conversation.  7. Have visual cues to remind you if you need to do something later.     Processing Speed:  1. Using multiple modalities (e.g., listening, writing notes, asking questions, recording) to learn new information is likely to allow additional time for processing, thus improving memory for the material.   2. Allowing sufficient time to complete tasks will reduce frustration and help to ensure completion.     Executive Functionin. Don't attempt to multi-task.  Separate tasks so that each can be completed one at a time  2. Consider using a calendar/day planner, as that may be effective to help you plan and stay on track.  Color-coding specific tasks by importance may add additional benefit to your  planner  3. Break down large projects into smaller tasks and write down the steps to completing the task.  Taking notes while reading can help with recall.     Storing Information: Use the below strategies to help you further enhance how information is stored  1. Rehearse - Immediately after seeing/hearing something, try to recall it.  Wait a few minutes, then check again.  Gradually lengthen the intervals between rehearsals.  2. Repetition of learned material is critical to ensure storage of information to be learned. Self-test at home to ensure learning.  3. Write down important information to improve your attention and focus and to have something to look back on when you need to recall it.  4. Make sure the person doesn't rattle off, but presents in a clear, logical, and unhurried manner.      Recalling Information:  1. Jog your memory - Lose something?  Think back to when you last had it.  What did you do next?  And after that?  Mentally walk yourself through each activity that followed.  Prodding your memory this way may enable you to recall the location of the missing item.  2. Use a cue - Symbolic reminders (the proverbial string around the finger) are helpful.  So too are memos, timers, calendar notes, etc.--keep them in visible, appropriate place  3. Get organized - Have fixed locations for all important papers, key phone numbers, medications, keys, wallet, glasses, tools, etc.  4. Develop routines - Routines can anchor memories so they do not drift away.       Word Finding:   Not being able to find a word when you need it is a common and very annoying problem. It is not strictly a memory issue, but more a filing and retrieval issue. You know the word or name you want, but it cannot be found in your brain file. It is like having all the files in your file cabinet emptied on the floor. Every piece of information is there but finding it can be a challenge.   One strategy that may help is working with a  speech pathologist/therapist to learn techniques to decrease word finding problems. Some of those strategies/techniques include the following:  Use circumlocutions. Describe the object you're trying to name instead of naming it.  Recite you're A, B, Cs. Go through the alphabet to see if a letter triggers finding the word.  Picture it. Try to visualize the spelling or writing of the word.  Relax. The harder you try to force yourself to come up with the word, the more frustrated you get and the worse you function.   Write it down. In situations where using correct words is critical, such as communicating on the radio while flying, write down the key words so that they are in front of you if needed.  Use word association. For words or names you continually misfile and can't find, try to come up with a word association, something that reminds you of the word or name.  Write a script. Try scripting something important that you want to say. Either write it out or practice it beforehand, so that you get it right.  Play word games. Doing crossword puzzles and playing word finding games may help your brain become more efficient at filing and retrieving words.     BRAIN TRAINING APPS  · BrainHQ (https://www.brainCIVICO.[employer]/) - BrainHQ has more than two dozen brain-training exercises organized into six categories: Attention, Brain Speed, Memory, People Skills, Intelligence, and Navigation. It allows you to fit brain exercises into your busy life, and access brain training on most internet-connected devices. Plus, each exercise continuously adapts to your unique performance. So you train at the right level for you.     · Mind Games (https://www.mindgames.com/) - Mind Games is a great collection of games based in part on principles derived from cognitive tasks to help you practice different mental skills. This includes a handful of free games. Additionally, there are a number of trial games included that can be played 3 times.  All games include your score history and a graph of your progress. Using some principles of standardized testing, your scores are also converted to a standard scale so that you can see where you need work and excel. The training center does the work for you by picking the perfect mix of exercises to keep you engaged.     · Elevate (https://Ikonopedia/) - Elevate was selected by [employer] as Darrin of the Year! Elevate is a brain training program designed to improve focus, speaking abilities, processing speed, memory, math skills, and more. Each person is provided with a personalized training program that adjusts over time to maximize results. Theoretically, the more you train with Elevate, the more you'll improve critical cognitive skills that are proven to boost productivity, earning power, and self-confidence. Users who train at least 3 times per week have reported dramatic gains and increased confidence. In-darrin purchases.     · Peak (https://www.[employer].net/) - Reach Peak performance with over 40 unique games, each one developed by neuroscientists and game experts to challenge your cognitive skills and push you further. Use , the  for your brain, to find the right workout for you at the right time. Choose from 's best recommendations to push your skills to the max. Or take contextual workouts like Coffee Break if you're short on time.  will help you track your progress using in-depth insights and keep you going when you need it most. Play for free or upgrade to Pro and get the best brain training experience available. In-darrin purchases.     OTHER SUGGESTIONS  Games and Apps like Sudoku; Crossword Puzzles; Word Find; Memory Games; Logic Games; Solitaire; and Hidden Object Mysteries. Find some you like and play them. It will exercise many of the skills necessary to improve function.    ADVICE ON CONCUSSIONS    Persisting Cognitive Symptoms: Most current research indicates that cognitive or  thinking-related symptoms resolve within 90-days after a concussion. It is also generally accepted that cognitive symptoms do not worsen months to years after a concussion. Instead, non-concussion factors including: sleep problems, headaches, certain types of medications, mental health symptoms (e.g., depression, anxiety, post-traumatic stress disorder), poorly managed pain, vestibular symptoms, stress, and other concerns can significantly impact thinking-related problems 90-days post-concussion.     Trouble Sleeping: Not getting enough sleep can negatively impact your thinking, mood, energy levels, and overall health. Below are some helpful recommendations to possibly improve your sleep.   Maintain the same bed and wake time daily.  Establish a fixed bed-time routine. A warm bath and/or light massage before bed may be helpful.  Avoid consumption of caffeine within 4-6 hours of bedtime.  Avoid consumption of alcohol too close to bedtime. When metabolized, alcohol can produce awakenings or lighter sleep.  Avoid heavy meals late in the evening.  Adequate vitamin and mineral intake is important to help the body produce melatonin, which promotes sleep.   When tolerated and medically indicated, 30-60 minutes of vigorous exercise a day can promote sleep. But, avoid exercising within two hours of sleep.  Expose yourself to natural light during the day, particularly early in the morning.  The sleeping area should be dark, cool and comfortable.  Ideally there should be no source of light in the bedroom while sleeping.  The room should be clean, tidy and quiet (e.g. neutral or natural sounds can be helpful to block out distracting  sounds)  The bed and bedroom should be reserved for sleep. Other activities (reading, watching TV, using internet, playing games) should take place in another room. Ideally there should be no electronic equipment in the bedroom.   No technology use at least 1-hour prior to bedtime (e.g., iPad,  computer, iPhone, television).   Visit: www.sleepfoundation.org for more strategies  If you are still  having trouble sleeping despite using the above strategies, then you should find a psychologist in your area that is trained in Cognitive Behavioral Therapy for Insomnia (CBT-I). Current research shows that this 6-session behavioral treatment is MOST EFFECTIVE at treating insomnia.    Headaches: You should follow-up with your neurologist about post-traumatic headaches. As they have likely explained, there are also a number of non-pharmacological resources to help manage head pain along with your medications. This includes:  Reducing stress  Psychotherapy to learn behavioral strategies to manage pain  Certain foods/adequate hydration can improve head pain  Exercise as you are able   Visit: www.headaches.org     Fatigue: Your doctor has likely explained the guidelines for activity immediately post-concussion. If you are still feeling fatigued or tired weeks to months after your concussion, then the following may be some helpful strategies:  Make sure you have discussed this with your primary care physician or neurologist. If they have not already done so, then they may want to run certain labs or check certain medications to make sure there aren't other reasons for fatigue/low energy.  Speak with a nutritionist about certain foods that improve your energy  You can also work with a health psychologist to gradually increase your activity level by using an activity tracker. The psychologist may help you to learn certain strategies to reduce negative self-talk, such as: I can't do it.  Notice certain times of day where your fatigue is better or worse. Schedule certain activities during those times to minimize fatigue's impact.  Pacing is the process of planning out an activity with built in breaks. Spend some time breaking down an activity into its parts, thinking through how long each will take, and then build breaks  into the activity.     Feeling Irritable: Feeling irritable is a normal part of life - we all feel irritable from time to time. However, if you think you're more irritable than you would like, then a consultation with a psychologist may be helpful. Some strategies may be helpful in the meantime:  Use the stop and think approach before you blurt out something. For instance, give yourself a few seconds to a few minutes before you talk.   Ask for a time out if you need a break to compose yourself.  Look up some meditation practices online, but practice them before you need them!  Listen to your body - you may need to build in stress relieving activities to promote a sense of calm. This could include: running, yoga, walking, stretching, or other exercises as you are able.     Feeling Depressed or Anxious: Feeling depressed or anxious is a normal part of life - we all feel sad or keyed up from time to time. However, if you think you're more depressed than you would like, then a consultation with a psychologist may be helpful.     Family Problems: Families can be sources of support and/or stress. If arguments are more frequent after the concussion, then we recommend seeking some help from a trained family therapist or family psychologist. These professionals have expertise in working with the entire family. Family treatment, however, can be difficult due to schedules.  Most families who commit to treatment may experience benefit within 12-weekly sessions.    Alcohol and or Drug Use: Abstain from consuming alcohol or using illicit drugs as these can contribute to cognitive difficulties.     Medication Adherence: Given the importance of proper administration of medication to both physical and cognitive health, the following tips may be helpful in ensuring that this patient is taking his/her medication as prescribed.   Use a pill box.   If necessary, enlist the help of family/friends to fill the pill box.   Have family  members monitor the pillbox to ensure that the patient is not missing doses or double-dosing.  Develop a routine for both filling the box (e.g., every Saturday night) and taking medications (e.g., keep the box in the same location and take pills at specific time of day) to make medication administration a more automatic habit.  Post reminders to take medications in prominent locations around the house.  Use cell phone or watch alarm to remind patient of dosing times

## 2023-02-23 NOTE — PROGRESS NOTES
Individual Psychotherapy (PhD/LCSW)    2/23/2023    Site:  Telemed         Location: her private office at work in [place]     Therapeutic Intervention: Met with patient.  Outpatient - Insight oriented psychotherapy 60 min - CPT code 76437, Outpatient - Behavior modifying psychotherapy 60 min - CPT code 02936, and Outpatient - Supportive psychotherapy 60 min - CPT Code 02129    Chief complaint/reason for encounter: anxiety     Interval history and content of current session:   Patient began session by reporting she realizes she has been experiencing Anxiety and that she is being mindful in regards to her thoughts.   SW noted that this is a good use of using insight and how it relates to CBT. SW reviewed how our thoughts impact out moods which then impact our reactions.     She reports that by Thursday she is [place] a 2 year old and she knows she is pushing herself.     She recounts how she completed a neuropsych. assessment a few weeks ago.  She reported that by the end of the 4 hour appointment, she was barely able to talk and respond to the psychiatrist.   She asked the psychiatrist to lower the lights due to light sensitivity.  She also reported that by the end of the appointment, she had to put her head down to communicate and could barely talk to complete the assessment.  She reports she was tired for a couple of days but has since recovered.  She finds it distressing and is rightfully concerned in regards to what her recovery will look like.     SW expressed empathy and understanding and noted that even though she has a legitimate concern, she can also focus on the fact that she has since recovered.   SW asked patient to consider what were some of the stressors or triggers that may have caused those reactions.     Patient had some difficulty in noting stressors.  SW recalled that patient stated the patient reported the lights are too bright.  SW asked patient to further explore.  Patient and SW reported that going  "to a new appointment, meeting a new provider, being asked many questions may have contributed to those symptoms.  SW also asked patient to explore the fact that although she has some symptoms at work, she does not have the same severe symptoms as she did at this appointment.  Patient eventually recognized that she was "very anxious about it."    She reports coping skills such as using waze janett to get to places and being practical.  SW noted that this is a good use of acceptance.  SW also recommended that patient can identify symptoms such as cognitive fog, reading issues, light sensitivity and write practical coping skills next to them. She reported she liked the idea.     She is aware that by Thursday at night she feels like a 2 year old.  She notes that she compares it to a  frustrated 2 ( ex: terrible 2's ).  SW expressed understanding and stated it is good to know limits.     Patient report she has been told to do exercises by her occupational therapist but she has to figure out when to do them.  She acknowledges it may only take 5 minutes to complete.  SW asked patient to identify a time and patient reports she can try at work for a few minutes. She also reports going home during lunch to walk her dog is a good coping skills she has found useful.       Treatment plan:  Target symptoms: work stress  Why chosen therapy is appropriate versus another modality: relevant to diagnosis  Outcome monitoring methods: self-report  Therapeutic intervention type: insight oriented psychotherapy, behavior modifying psychotherapy, supportive psychotherapy    Risk parameters:  Patient reports no suicidal ideation  Patient reports no homicidal ideation  Patient reports no self-injurious behavior  Patient reports no violent behavior    Verbal deficits: None    Patient's response to intervention:  The patient's response to intervention is accepting.    Progress toward goals and other mental status changes:  The patient's progress " toward goals is fair .    Diagnosis:     ICD-10-CM ICD-9-CM   1. Post concussion syndrome  F07.81 310.2   2. Anxiety  F41.9 300.00       Plan:  individual psychotherapy    Return to clinic: 2 weeks    Length of Service (minutes): 60    Eduard Martinez LCSW

## 2023-02-28 ENCOUNTER — PATIENT MESSAGE (OUTPATIENT)
Dept: NEUROLOGY | Facility: CLINIC | Age: 53
End: 2023-02-28
Payer: COMMERCIAL

## 2023-03-03 ENCOUNTER — OFFICE VISIT (OUTPATIENT)
Dept: NEUROLOGY | Facility: CLINIC | Age: 53
End: 2023-03-03
Payer: COMMERCIAL

## 2023-03-03 ENCOUNTER — OFFICE VISIT (OUTPATIENT)
Dept: PSYCHIATRY | Facility: CLINIC | Age: 53
End: 2023-03-03
Payer: COMMERCIAL

## 2023-03-03 VITALS
HEART RATE: 61 BPM | BODY MASS INDEX: 24.27 KG/M2 | DIASTOLIC BLOOD PRESSURE: 62 MMHG | WEIGHT: 137 LBS | SYSTOLIC BLOOD PRESSURE: 112 MMHG

## 2023-03-03 DIAGNOSIS — F32.A DEPRESSION, UNSPECIFIED DEPRESSION TYPE: ICD-10-CM

## 2023-03-03 DIAGNOSIS — F43.10 PTSD (POST-TRAUMATIC STRESS DISORDER): ICD-10-CM

## 2023-03-03 DIAGNOSIS — F07.81 POST CONCUSSION SYNDROME: ICD-10-CM

## 2023-03-03 DIAGNOSIS — F33.1 MODERATE EPISODE OF RECURRENT MAJOR DEPRESSIVE DISORDER: Primary | ICD-10-CM

## 2023-03-03 DIAGNOSIS — F07.81 POST CONCUSSION SYNDROME: Primary | ICD-10-CM

## 2023-03-03 DIAGNOSIS — F41.9 ANXIETY: ICD-10-CM

## 2023-03-03 DIAGNOSIS — F41.1 GENERALIZED ANXIETY DISORDER: ICD-10-CM

## 2023-03-03 PROCEDURE — 1159F MED LIST DOCD IN RCRD: CPT | Mod: CPTII,S$GLB,, | Performed by: STUDENT IN AN ORGANIZED HEALTH CARE EDUCATION/TRAINING PROGRAM

## 2023-03-03 PROCEDURE — 1160F PR REVIEW ALL MEDS BY PRESCRIBER/CLIN PHARMACIST DOCUMENTED: ICD-10-PCS | Mod: CPTII,S$GLB,, | Performed by: STUDENT IN AN ORGANIZED HEALTH CARE EDUCATION/TRAINING PROGRAM

## 2023-03-03 PROCEDURE — 99499 NO LOS: ICD-10-PCS | Mod: S$GLB,,, | Performed by: CLINICAL NEUROPSYCHOLOGIST

## 2023-03-03 PROCEDURE — 3078F DIAST BP <80 MM HG: CPT | Mod: CPTII,S$GLB,, | Performed by: STUDENT IN AN ORGANIZED HEALTH CARE EDUCATION/TRAINING PROGRAM

## 2023-03-03 PROCEDURE — 90792 PR PSYCHIATRIC DIAGNOSTIC EVALUATION W/MEDICAL SERVICES: ICD-10-PCS | Mod: S$GLB,,, | Performed by: STUDENT IN AN ORGANIZED HEALTH CARE EDUCATION/TRAINING PROGRAM

## 2023-03-03 PROCEDURE — 99999 PR PBB SHADOW E&M-EST. PATIENT-LVL III: CPT | Mod: PBBFAC,,, | Performed by: STUDENT IN AN ORGANIZED HEALTH CARE EDUCATION/TRAINING PROGRAM

## 2023-03-03 PROCEDURE — 90792 PSYCH DIAG EVAL W/MED SRVCS: CPT | Mod: S$GLB,,, | Performed by: STUDENT IN AN ORGANIZED HEALTH CARE EDUCATION/TRAINING PROGRAM

## 2023-03-03 PROCEDURE — 3074F PR MOST RECENT SYSTOLIC BLOOD PRESSURE < 130 MM HG: ICD-10-PCS | Mod: CPTII,S$GLB,, | Performed by: STUDENT IN AN ORGANIZED HEALTH CARE EDUCATION/TRAINING PROGRAM

## 2023-03-03 PROCEDURE — 99999 PR PBB SHADOW E&M-EST. PATIENT-LVL III: ICD-10-PCS | Mod: PBBFAC,,, | Performed by: STUDENT IN AN ORGANIZED HEALTH CARE EDUCATION/TRAINING PROGRAM

## 2023-03-03 PROCEDURE — 3008F PR BODY MASS INDEX (BMI) DOCUMENTED: ICD-10-PCS | Mod: CPTII,S$GLB,, | Performed by: STUDENT IN AN ORGANIZED HEALTH CARE EDUCATION/TRAINING PROGRAM

## 2023-03-03 PROCEDURE — 99499 UNLISTED E&M SERVICE: CPT | Mod: S$GLB,,, | Performed by: CLINICAL NEUROPSYCHOLOGIST

## 2023-03-03 PROCEDURE — 3074F SYST BP LT 130 MM HG: CPT | Mod: CPTII,S$GLB,, | Performed by: STUDENT IN AN ORGANIZED HEALTH CARE EDUCATION/TRAINING PROGRAM

## 2023-03-03 PROCEDURE — 1160F RVW MEDS BY RX/DR IN RCRD: CPT | Mod: CPTII,S$GLB,, | Performed by: STUDENT IN AN ORGANIZED HEALTH CARE EDUCATION/TRAINING PROGRAM

## 2023-03-03 PROCEDURE — 3078F PR MOST RECENT DIASTOLIC BLOOD PRESSURE < 80 MM HG: ICD-10-PCS | Mod: CPTII,S$GLB,, | Performed by: STUDENT IN AN ORGANIZED HEALTH CARE EDUCATION/TRAINING PROGRAM

## 2023-03-03 PROCEDURE — 3008F BODY MASS INDEX DOCD: CPT | Mod: CPTII,S$GLB,, | Performed by: STUDENT IN AN ORGANIZED HEALTH CARE EDUCATION/TRAINING PROGRAM

## 2023-03-03 PROCEDURE — 1159F PR MEDICATION LIST DOCUMENTED IN MEDICAL RECORD: ICD-10-PCS | Mod: CPTII,S$GLB,, | Performed by: STUDENT IN AN ORGANIZED HEALTH CARE EDUCATION/TRAINING PROGRAM

## 2023-03-03 RX ORDER — DULOXETIN HYDROCHLORIDE 30 MG/1
30 CAPSULE, DELAYED RELEASE ORAL DAILY
Qty: 30 CAPSULE | Refills: 1 | Status: SHIPPED | OUTPATIENT
Start: 2023-03-03 | End: 2023-03-30 | Stop reason: SDUPTHER

## 2023-03-03 RX ORDER — BUPROPION HYDROCHLORIDE 75 MG/1
75 TABLET ORAL 2 TIMES DAILY
Qty: 60 TABLET | Refills: 1 | Status: SHIPPED | OUTPATIENT
Start: 2023-03-03 | End: 2023-03-30 | Stop reason: ALTCHOICE

## 2023-03-03 RX ORDER — ALPRAZOLAM 0.5 MG/1
0.5 TABLET ORAL 2 TIMES DAILY
Qty: 60 TABLET | Refills: 0 | Status: SHIPPED | OUTPATIENT
Start: 2023-03-03 | End: 2023-03-30 | Stop reason: SDUPTHER
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
## 2023-03-03 NOTE — PROGRESS NOTES
NEUROPSYCHOLOGICAL EVALUATION FEEDBACK    Yanelis Lundy attended an in-person feedback session today.  We discussed the results of the neuropsychological evaluation and I gave time to discuss questions and concerns. For full evaluation details, please see the note from this provider dated 2/14/2023. A copy of the report was provided today in print. 90 minutes    Problem List Items Addressed This Visit          Neuro    Post concussion syndrome - Primary       Psychiatric    Anxiety    Depression    PTSD (post-traumatic stress disorder)         Jessica Ray, PhD  Licensed Clinical Neuropsychologist  [facility] - Department of Neurology

## 2023-03-06 ENCOUNTER — PATIENT MESSAGE (OUTPATIENT)
Dept: NEUROLOGY | Facility: CLINIC | Age: 53
End: 2023-03-06
Payer: COMMERCIAL

## 2023-03-16 NOTE — PROGRESS NOTES
[facility]  Speech Therapy Treatment Note- Neurological Rehabilitation  Date: 3/17/2023     Name: Yanelis Lundy   MRN: [id number]   Therapy Diagnosis:   Encounter Diagnosis   Name Primary?    Post concussion syndrome Yes   Physician: Wei Lin III, MD  Physician Orders: Ambulatory Referral to Speech Therapy   Medical Diagnosis: Post-Concussion Syndrome    Visit #/ Visits Authorized: 2/ 20  Date of Evaluation:  1/19/2023  Insurance Authorization Period: 1/19/2023 - 12/9/2024  Plan of Care Expiration Date:    3/16/2023  Extended Plan of Care:  n/a   Progress Note: 2/19/2023   Visits Cancelled: 0  Visits No Show: 0    Time In:  8:30  Time Out:  9:15  Total Billable Time: 45     Precautions: Standard  Subjective:   Patient reports: ENT and audiology appointment this afternoon.   She was compliant to home exercise program.   Response to previous treatment: none   Pain Scale:  4/10 on a Visual Analog Scale currently.   Pain Location: headache  Objective:   TIMED  Procedure Min.   Cognitive Therapeutic Interventions, first 15 minutes CPT 86408  15   Cognitive Therapeutic Interventions, each additional 15 minutes CPT 88804  30           Total Timed Units: 3  Total Untimed Units: 1  Charges Billed/Number of units: 3    Short Term Goals: (4 weeks) Current Progress:   Patient will complete selective attention tasks (auditory or visual) with 90% accuracy independently increase selective attention.    Progressing/ Not Met 3/17/2023   Not addressed in today's session.      2. Patient will sustain attention to complete moderate to complex reasoning tasks for 2 minutes with one request for clarification to increase sustained attention.     Progressing/ Not Met 3/17/2023   Patient was able to sustain attention throughout session in moderate-high level conversation. No word finding difficulties present. Patient with adequate ability to defend reasoning for overall improvement in status (given her improved  "awareness).    3. Patient will use Goal Plan Action Review strategy to complete moderate to complex reasoning, planning, or organization tasks with 90% accuracy independently to improve functional executive function skills.     Progressing/ Not Met 3/17/2023   Goal- Track cognitive fatigue  Plan- Write tracking in planner  Action- immediately  Review-  Patient reports, "That was the missing piece; since I've become more aware of my symptoms I haven't having as many headaches."  Patient reports continued sensitivity   4. Patient will complete short term recall tasks after a 5 minutes delay with 90% accuracy  independently  with use of memory strategies to improve recall of information and generalization of memory strategies.     Progressing/ Not Met 3/17/2023   Patient introduced to memory strategies (WRAP) and was provided with a handout and detailed explanation of each strategy.      5. Patient will independently implement cognitive/sensory rest periods throughout day after identifying cognitive fatigue including limiting sensory input (sound, light, etc.)      Progressing/ Not Met 3/17/2023   Patient has been incorporating walks into her day when feeling overwhelmed.    Patient has also recently purchased white board and plans to utilize planner more to better organize her daily life.    6. Patient will identify two solutions to functional daily problems with thought flexibility and minimal assistance for use of strategies.      Progressing/ Not Met 3/17/2023   Not addressed in today's session.      7. Patient will complete a task to improve attention and memory (I.e. sample bill paying activity, recipe, or multiple choice comprehension questions to 1 paragraphs) with 80% accuracy and natural environment noise distractions (TV news background, music, etc.).      Progressing/ Not Met 3/17/2023   Patient has also recently purchased white board and plans to utilize planner more to better organize her daily life. " "  Patient reports timers have been a "life saver" to keep her on track.       Patient wrote recipe (pumpkin muffins) while engaging in moderate level conversation with 70% accuracy.  Patient with difficulties completing tasks simultaneously.  When conversational component was taken away patient with great sustained attention to task.     Patient Education/Response:   Patient educated regarding the followin. Reviewed spoon theory  2. Reviewed importance of utilizing planner and tracking fatigue  3. WRAP strategies  4. Discussed current progress.        Patient verbalized understanding to all above education provided.             Home program established:  Not yet established  Exercises were reviewed and MARY was able to demonstrate them prior to the end of the session.  MARY demonstrated fair  understanding of the education provided.     See Electronic Medical Record under Patient Instructions for exercises provided throughout therapy.  Assessment:   MARY is progressing well towards her goals. Session was performed in reduced lighting (lamp placed behind patient). Patient with improvement in being able to monitor concussion symtoms which has reduced cognitive fatigue. Patient has found that taking walks help clear her head and gives her back spoons. Patient started Cymbalta medication and reports that it has helped to improve nerve pain in shoulder but has made it difficult to get into a "deep sleep". Patient with difficulty utilizing planner. Patient with increased accuracy and speed to complete work task. urrent goals remain appropriate. Goals to be updated as necessary. See Updated POC.    Patient prognosis is Fair. Patient will continue to benefit from skilled outpatient speech and language therapy to address the deficits listed in the problem list on initial evaluation, provide patient/family education and to maximize patient's level of independence in the home and community environment.   Medical " necessity is demonstrated by the following IMPAIRMENTS:  Deficits in executive functioning, attention, and memory prevent the patient performing herwork and personal daily activities effectively and efficiently which may put her at risk of unsafe behavior and a decline in quality of life.   Barriers to Therapy: none  Patient's spiritual, cultural and educational needs considered and patient agreeable to plan of care and goals.  Plan:   Continue Plan of Care with focus on cognitive communication deficits. See Updated POC.    SABINA Connelly-SLP   3/17/2023

## 2023-03-17 ENCOUNTER — CLINICAL SUPPORT (OUTPATIENT)
Dept: AUDIOLOGY | Facility: CLINIC | Age: 53
End: 2023-03-17
Payer: COMMERCIAL

## 2023-03-17 ENCOUNTER — CLINICAL SUPPORT (OUTPATIENT)
Dept: REHABILITATION | Facility: HOSPITAL | Age: 53
End: 2023-03-17
Payer: COMMERCIAL

## 2023-03-17 ENCOUNTER — OFFICE VISIT (OUTPATIENT)
Dept: OTOLARYNGOLOGY | Facility: CLINIC | Age: 53
End: 2023-03-17
Payer: COMMERCIAL

## 2023-03-17 DIAGNOSIS — R29.898 DECREASED ROM OF NECK: Primary | ICD-10-CM

## 2023-03-17 DIAGNOSIS — F07.81 POST CONCUSSION SYNDROME: Primary | ICD-10-CM

## 2023-03-17 DIAGNOSIS — G44.309 POST-CONCUSSION HEADACHE: ICD-10-CM

## 2023-03-17 DIAGNOSIS — H53.9 VISION ABNORMALITIES: ICD-10-CM

## 2023-03-17 DIAGNOSIS — Z74.09 IMPAIRED FUNCTIONAL MOBILITY, BALANCE, AND ENDURANCE: ICD-10-CM

## 2023-03-17 DIAGNOSIS — G43.809 VESTIBULAR MIGRAINE: Primary | ICD-10-CM

## 2023-03-17 DIAGNOSIS — F07.81 POST CONCUSSION SYNDROME: ICD-10-CM

## 2023-03-17 DIAGNOSIS — G43.809 VESTIBULAR MIGRAINE: ICD-10-CM

## 2023-03-17 DIAGNOSIS — H90.3 BILATERAL HIGH FREQUENCY SENSORINEURAL HEARING LOSS: Primary | ICD-10-CM

## 2023-03-17 PROCEDURE — 99204 OFFICE O/P NEW MOD 45 MIN: CPT | Mod: S$GLB,,, | Performed by: OTOLARYNGOLOGY

## 2023-03-17 PROCEDURE — 97110 THERAPEUTIC EXERCISES: CPT | Mod: PO

## 2023-03-17 PROCEDURE — 92567 TYMPANOMETRY: CPT | Mod: S$GLB,,, | Performed by: AUDIOLOGIST

## 2023-03-17 PROCEDURE — 1159F MED LIST DOCD IN RCRD: CPT | Mod: CPTII,S$GLB,, | Performed by: OTOLARYNGOLOGY

## 2023-03-17 PROCEDURE — 92557 COMPREHENSIVE HEARING TEST: CPT | Mod: S$GLB,,, | Performed by: AUDIOLOGIST

## 2023-03-17 PROCEDURE — 97130 THER IVNTJ EA ADDL 15 MIN: CPT | Mod: PO

## 2023-03-17 PROCEDURE — 97112 NEUROMUSCULAR REEDUCATION: CPT | Mod: PO

## 2023-03-17 PROCEDURE — 99204 PR OFFICE/OUTPT VISIT, NEW, LEVL IV, 45-59 MIN: ICD-10-PCS | Mod: S$GLB,,, | Performed by: OTOLARYNGOLOGY

## 2023-03-17 PROCEDURE — 99999 PR PBB SHADOW E&M-EST. PATIENT-LVL II: ICD-10-PCS | Mod: PBBFAC,,, | Performed by: AUDIOLOGIST

## 2023-03-17 PROCEDURE — 1159F PR MEDICATION LIST DOCUMENTED IN MEDICAL RECORD: ICD-10-PCS | Mod: CPTII,S$GLB,, | Performed by: OTOLARYNGOLOGY

## 2023-03-17 PROCEDURE — 92557 PR COMPREHENSIVE HEARING TEST: ICD-10-PCS | Mod: S$GLB,,, | Performed by: AUDIOLOGIST

## 2023-03-17 PROCEDURE — 97129 THER IVNTJ 1ST 15 MIN: CPT | Mod: PO

## 2023-03-17 PROCEDURE — 99999 PR PBB SHADOW E&M-EST. PATIENT-LVL II: CPT | Mod: PBBFAC,,, | Performed by: AUDIOLOGIST

## 2023-03-17 PROCEDURE — 92567 PR TYMPA2METRY: ICD-10-PCS | Mod: S$GLB,,, | Performed by: AUDIOLOGIST

## 2023-03-17 PROCEDURE — 99999 PR PBB SHADOW E&M-EST. PATIENT-LVL III: ICD-10-PCS | Mod: PBBFAC,,, | Performed by: OTOLARYNGOLOGY

## 2023-03-17 PROCEDURE — 99999 PR PBB SHADOW E&M-EST. PATIENT-LVL III: CPT | Mod: PBBFAC,,, | Performed by: OTOLARYNGOLOGY

## 2023-03-17 PROCEDURE — 97530 THERAPEUTIC ACTIVITIES: CPT | Mod: PO

## 2023-03-17 PROCEDURE — 97140 MANUAL THERAPY 1/> REGIONS: CPT | Mod: PO

## 2023-03-17 NOTE — PLAN OF CARE
"[facility]  Speech Therapy Updated Plan of Care-Neurological Rehabilitation         Date: 3/17/2023   Name: Mary Lundy  Clinic Number: [id number]    Therapy Diagnosis:   Encounter Diagnosis   Name Primary?    Post concussion syndrome Yes     Physician: Wei Lin III, MD    Physician Orders: Ambulatory Referral to Speech Therapy   Medical Diagnosis: Post-Concussion Syndrome       Visit #/ Visits Authorized:  2 /20   Evaluation Date: 1/19/2023  Insurance Authorization Period: 1/19/2023 - 12/9/2024  Plan of Care Expiration:   3/16/2023  New POC Certification Period:   4/14/2023    Total Visits Received: 2    Precautions:Standard and Post concussion  Subjective     Update: MARY is progressing well towards her goals. Session was performed in reduced lighting (lamp placed behind patient). Patient with improvement in being able to monitor concussion symtoms which has reduced cognitive fatigue. Patient has found that taking walks help clear her head and gives her back spoons. Patient started Cymbalta medication and reports that it has helped to improve nerve pain in shoulder but has made it difficult to get into a "deep sleep". Patient with difficulty utilizing planner. Patient with increased accuracy and speed to complete work task. urrent goals remain appropriate. Goals to be updated as necessary.     Objective     Update: see follow up note dated 3/17/2023    Assessment     Update: Mary Lundy presents to [facility] status post medical diagnosis of Post-Concussion Syndrome. Demonstrates impairments including limitations as described in the problem list. Positive prognostic factors include patient motivation. Negative prognostic factors include multiple concussion, severity of last concussion, and time post concussion. She presents with moderate cognitive communication disorder charaterized by deficits in visuospatial skills, attention, and delayed memory .  Barriers to " therapy include emotional control and inconsistent performance. Patient will benefit from skilled, outpatient rehabilitation speech therapy.    Rehab Potential: fair   Pt's spiritual, cultural, and educational needs considered and patient agreeable to plan of care and goals.    Education: Plan of Care, role of SLP in care, attention shifting strategies, scheduling/ cancellation policy, and insurance limitations / visit limit  2    Previous Short Term Goals Status: 4 weeks     Patient will complete selective attention tasks (auditory or visual) with 90% accuracy independently increase selective attention.  2. Patient will sustain attention to complete moderate to complex reasoning tasks for 2 minutes with one request for clarification to increase sustained attention.  3. Patient will use Goal Plan Action Review strategy to complete moderate to complex reasoning, planning, or organization tasks with 90% accuracy independently to improve functional executive function skills.  4. Patient will complete short term recall tasks after a 5 minutes delay with 90% accuracy  independently  with use of memory strategies to improve recall of information and generalization of memory strategies.  5. Patient will independently implement cognitive/sensory rest periods throughout day after identifying cognitive fatigue including limiting sensory input (sound, light, etc.)   6. Patient will identify two solutions to functional daily problems with thought flexibility and minimal assistance for use of strategies.   Discontinue  7. Patient will complete a task to improve attention and memory (I.e. sample bill paying activity, recipe, or multiple choice comprehension questions to 1 paragraphs) with 80% accuracy and natural environment noise distractions (TV news background, music, etc.).      New Short Term Goals: 4 weeks  1. Patient will complete selective and alternating attention tasks (auditory or visual) with 90% accuracy  independently increase selective attention.   2. Patient will complete moderate to complex reasoning tasks for 2 minutes with one request for clarification to increase sustained attention.  3. Patient will use Goal Plan Action Review strategy to complete moderate to complex reasoning, planning, or organization tasks with 90% accuracy independently to improve functional executive function skills.  4. Patient will complete short term recall tasks after a 5 minutes delay with 90% accuracy  independently  with use of memory strategies to improve recall of information and generalization of memory strategies.  5. Patient will independently implement cognitive/sensory rest periods throughout day after identifying cognitive fatigue including limiting sensory input (sound, light, etc.)   6. Patient will complete a task to improve attention and memory (I.e. sample bill paying activity, recipe, or multiple choice comprehension questions to 1 paragraphs) with 80% accuracy and natural environment noise distractions (TV news background, music, etc.).   Long Term Goal Status:  8 weeks  Patient will improve  attention skills to effectively attend to and communicate in complex daily living tasks in functional living environment.   2. Patient will demonstrate use of planning,  initiation, and  self-monitoring during daily living activities to improve safety and awareness in functional living environment.  3. Patient will apply problem-solving strategies with no visual support in daily living functional activities at home and in the community.   4. Patient will use appropriate memory strategies to schedule and recall weekly activities, express needs and recall names to maintain safety and participate socially in functional living environment.      Goals Previously Met:  none     Reasons for Recertification of Therapy: Patient continues to demonstrate difficulties in everyday life which prevent her from maximizing her  potential.    Plan     Updated Certification Period: 3/17/2023 to 4/14/2023    Recommended Treatment Plan: Patient will participate in the [facility] program for speech therapy 1 times per week to address her Cognition deficits, to educate patient and their family, and to participate in a home exercise program.     Other recommendations: no other recommendations at this time     Therapist's Name:  Gris Reeder CF-SLP   3/17/2023      I CERTIFY THE NEED FOR THESE SERVICES FURNISHED UNDER THIS PLAN OF TREATMENT AND WHILE UNDER MY CARE      Physician Name: _______________________________    Physician Signature: ____________________________

## 2023-03-17 NOTE — PATIENT INSTRUCTIONS
Continue all eye exercises with the following progressions:   - Add accommodation task: Choose a near and far target at midline. Change gaze between the two targets ensuring that each come into focus. Perform for 1 minute.   - Continue pencil push ups; holding points that appear double and trying to bring into focus.   - Smooth pursuits, horizontal/vertical/diagonal directions, 1 x 1 minute each   - Saccades at 70 bpm on metronome   Horizontal 1 x 1 minute   Vertical/diagonal directions 1 x 30 seconds
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
## 2023-03-17 NOTE — PROGRESS NOTES
[facility]   Physical Therapy Treatment Note     Name: Yanelis Lundy  Clinic Number: [id number]    Therapy Diagnosis:   Encounter Diagnoses   Name Primary?    Decreased ROM of neck Yes    Impaired functional mobility, balance, and endurance      Physician: Wei Lin III, MD    Visit Date: 3/17/2023      Physician Orders: PT Eval and Treat vestibular rehab, neck  Medical Diagnosis from Referral: F07.81 (ICD-10-CM) - Post-concussion syndrome  Evaluation Date: 1/19/2023  Authorization Period Expiration: 12/29/2023  Plan of Care Expiration: 3/17/2023 extend to 5/17/2023  Visit # / Visits authorized: 1/20     PN Due: 2/19/23      Precautions: Standard  PTA Visit #: 0/5     Time In: 7:05  Time Out: 7:45  Total Billable Time: 40 minutes    SUBJECTIVE     Pt reports: Pt reports that she has gotten to where she can handle light and sound a little better.  Light is tolerated with a hat or sunglasses.  She still does not tolerate over head light well.  She was compliant with home exercise program.  Response to previous treatment: Pt was OK  Functional change: none    Pain: 5/10, headache 6/10  Location: left upper quarter,       OBJECTIVE     Objective Measures updated at progress report unless specified.   Observation: Pt was able to enter the clinic ambulating independently without an assistive device. She was wearing a hat and sunglasses    Posture: R side of pelvis elevated as compared toe the L    Cervical Range of Motion: see evaluation     Shoulder Range of Motion:   Shoulder Left Right   F   R 140 wnl   Abduction 90 nt   ER 40 nt   IR To abd nt     Strength:  Cervical MMT   Flexion nt   Extension nt   Right Side Bend nt   Left Side Bend nt     Upper Extremity Strength  (R) UE  (L) UE    Shoulder flexion: 5/5 Shoulder flexion: 5/5   Shoulder Abduction: 5/5 Shoulder abduction: 5/5   Shoulder ER 5/5 Shoulder ER 5/5   Shoulder IR 5/5 Shoulder IR 5/5   Elbow flexion: 5/5 Elbow flexion: 5/5    Elbow extension: 5/5 Elbow extension: 5/5   Wrist flexion: 5/5 Wrist flexion: 5/5   Wrist extension: 5/5 Wrist extension: 5/5    5/5 : 5/5   Lower Trap nt Lower Trap nt   Middle Trap nt Middle Trap nt   Rhomboids nt Rhomboids nt         Special Tests:  Distraction nt   Compression nt   Spurlings nt   Sharp-Niyah nt   VA test nt   Lateral Flexion Alar Ligament nt   DNF test nt     Upper Limb Neurodynamic testing:   Right   Left     S1 S2  S1 S2   BPNT nt/5 nt/5  nt/5 nt/5   UNT nt/5 nt/5  nt/5 nt/5   MNT nt/5 nt/5  nt/5 nt/5   RNT nt/5 nt/5  nt/5 nt/5         Joint Mobility: Cx spine   PA's nt,    Transverse glides WFL,      Thoracic mobility: nt    Palpation: no palpable tenderness      Sensation: increased light touch sense in L thumb and tips of L hand fingers  also decreased in C 1-T2  She reports a dead spot on the L posterior portion of her head.  Flexibility: not tested   Treatment   Continuation of evaluation = 17 min  MARY received the treatments listed below:      therapeutic exercises to develop  for 00 minutes including:      manual therapy techniques: Joint mobilizations, Manual traction, and Soft tissue Mobilization were applied to the: Cx and thoracic spine and L shoulder for 23 minutes, including:  Passive mobilization to the L GH joint  Manual C spine traction  Soft tissue mobilization to the Cx and thoracic paraspinals  Passive mobilization to the thoracic spine and rib cage.      neuromuscular re-education activities to improve: Balance and Posture for 00 minutes. The following activities were included:      therapeutic activities to improve functional performance for 00  minutes, including:    Patient Education and Home Exercises     Home Exercises Provided and Patient Education Provided     Education provided:   - pt to continue with her present HEP    Written Home Exercises Provided: Patient instructed to cont prior HEP. Exercises were reviewed and MARY was able to demonstrate  them prior to the end of the session.  MARY demonstrated good  understanding of the education provided. See EMR under Patient Instructions for exercises provided during therapy sessions    ASSESSMENT     Pt presents with reported improvement in her condition.  She reported that she is better at tolerating environmental input such as light sound.  She was treated in a room without an overhead on.  The only light entered through a open do with light from the hallway.  Pt was able to tolerate manual interventions without an increase in her level of discomfort.    MARY Is not progressing well towards her goals.   Pt prognosis is Good.     Pt will continue to benefit from skilled outpatient physical therapy to address the deficits listed in the problem list box on initial evaluation, provide pt/family education and to maximize pt's level of independence in the home and community environment.     Pt's spiritual, cultural and educational needs considered and pt agreeable to plan of care and goals.     Anticipated barriers to physical therapy: scheduling    Goals:   Short Term Goals: 4 weeks   Patient will report compliance with home exercise program for cervical strength/ ROM at least 3x/ week to decrease headache frequency.   Assess Functional Gait Assessment and set goals as needed.   Patient will report decreased headache maximal intensity to 5/10 to demonstrate improved tolerance to daily activities.      Long Term Goals: 8 weeks   Patient will report decreased frequency of headache to 1x/week to demonstrate improved management of symptoms and improved tolerance to daily activities.   Patient will demonstrate improved cervical AROM for rotation to 65 deg to improve driving and ability to return to dancing.    Patient will demonstrate improved cervical flexor strength to 4/5 with at least a 20 second hold for improved posture to decrease headache occurrence.   PLAN   Extend physical therapy Plan of Care to  5/17/2023  Progress Physical Therapy treatments to assist Pt with managing her L upper quarter Sx.    Taz Leal, PT
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
## 2023-03-17 NOTE — LETTER
March 18, 2023        Wei Lin III, MD  [address]  [address]  [facility] [address]             [facility] [address]  [address]  [facility] [address]  Phone: [phone]  Fax: [phone]   Patient: Yanelis Lundy   MR Number: [id number]   YOB: 1970   Date of Visit: 3/17/2023       Dear Dr. Lin:    Thank you for referring Yanelis Lundy to me for evaluation. Below are the relevant portions of my assessment and plan of care.            If you have questions, please do not hesitate to call me. I look forward to following Yanelis along with you.    Sincerely,      Dominik Herrera MD           CC  No Recipients
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
## 2023-03-22 NOTE — PROGRESS NOTES
Occupational Therapy Treatment Note     Date: 3/24/2023  Name: Mary Lundy  Clinic Number: [id number]    Therapy Diagnosis:   No diagnosis found.    Physician: Wei Lin III, MD    Physician Orders: Eval and Treat - oculomotor rehab   Medical Diagnosis: F07.81 (ICD-10-CM) - Post-concussion syndrome  Insurance Authorization Period Expiration: 12/31/2023  Plan of Care Certification Period: 10 visits; around 3/30/2022  Date of Return to MD: 4/13/2023 Dr. Lin    Visit # / Visits authorized: 3 / 20 Private Insurance   Time In: 0811  Time Out: 0839  Total Billable (one on one) Time: 28 minutes    Precautions: Standard    Subjective     Pt reports: that she has a migraine; the bright lights driving here set it off and she has had a busy week at work. She has not been as compliant with eye exercises. She feels her cognition and energy level have been better.  She was compliant with home exercise program given last session.   Response to previous treatment: eye exercises are getting easier, headaches are more manageable   Functional change: ongoing    Pain: 6-7/10  Location: Headache     Headache Averages: Frequency: 3-4x/week; Intensity: Worse later in the work week, 5/10 on average but typically more so a dull annoyance, certain situations/environments exacerbate headaches; Duration: Varies, sometimes headaches can be constant but other times will go away     Date of Onset: 4 total concussions; most recent in 2018    Objective      MARY participated in neuromuscular re-education activities to improve: oculomotor function for 20 minutes. The following activities were included:  Seated in dark room using plain card(s):  - Saccades at 70 bpm   Horizontal 1 x 53 seconds   Vertical 1 x 40 seconds   Diagonals 1 x 30 seconds   - Pencil push ups holding out of focus targets x5-7 sec, 1 x 10 using highlighter   - Connor string, 1 x 10     MARY participated in dynamic functional therapeutic activities to improve  functional performance for 8 minutes, including:  - Pt educated on how to appropriately progress oculomotor exercises and discussed different progressions that will be made during future sessions   - Pt reminded that all exercises should be perceived as 'high mild/low moderate' difficulty level and how to modify exercises if this is exceeded   - Pt educated on monocular and binocular convergence exercise that was added to HEP this date     Home Exercises and Education Provided      Education provided:   - HEP  - Progress towards goals    Written Home Exercises Provided: Patient instructed to cont prior HEP.  Exercises were reviewed and MARY was able to demonstrate them prior to the end of the session.    MARY demonstrated good  understanding of the education provided.     See EMR under Patient Instructions for exercises provided prior visit.  3/17/2023: oculomotor progressions (see pt instructions); accommodation activity   3/24/2023: oculomotor progressions (see pt instructions); walking monocular and binocular convergence     Assessment      Pt continues with headaches, light sensitivity, oculomotor dysfunction, and motion intolerance limiting functional task performance. Pt thought OT appointment was scheduled for 8:15 AM today instead on 7:45 AM. Called patient around 8:05, and pt stated that she was waiting in the parking lot. Both therapist and pt agreed to shortened session this date. Pt presented today with migraine. Session took place in dark room with lamp, and unable to progress oculomotor exercises much. Today's interventions were focused on eye exercises that have been most challenging including saccades and convergence. Smooth pursuits were deferred secondary to time constraints. Also intended to further assess motion tolerance with MSQ, but will defer this until next session. During saccades, gaze shifting into RLQ was most difficult. Right eye did not converge during pencil push ups; mild  improvement of right eye convergence noted during radha string exercise. HEP was updated today to include monocular and binocular walking convergence. Pt was educated on this exercise but unable to perform due to time. Pt would continue to benefit from skilled occupational therapy services to maximize oculomotor functioning and gaze stabilization, habituation for desensitization to environments/movements that elicit/increase symptoms, pt education on mindfulness/relaxation strategies, and HEP/HAP guidance to improve functional participation with meaningful occupations.    MARY is progressing well towards her goals and there are no updates to goals at this time. Pt prognosis is Fair-Guarded; Fair as pt has made improvements over the past month; Guarded due to hx of 4 concussions and underlying visual deficits      Pt will continue to benefit from skilled outpatient occupational therapy to address the deficits listed in the problem list on initial evaluation provide pt/family education and to maximize pt's level of independence in the home and community environment.     Pt's spiritual, cultural and educational needs considered and pt agreeable to plan of care and goals.    Anticipated barriers to occupational therapy: none noted     Goals:  Short Term Goals: 5 weeks   1) Pt will tolerate oculomotor and/or habitation home exercise/activity program, reporting at least 50% compliance. ongoing  2) Pt will pace work tasks reported decreased post concussion headache to < / = 4/10 no more than 5x/wk. ongoing  3) Pt will verbalize eye ergonomics to decrease stress on eyes. ongoing  4) Near point convergence will decrease to 15 cm or less to improve oculomotor coordination and ability to fixate on close up work. ongoing  5) Pt will demonstrate ability to track single target WFL, in all directions, without onset of headache or dizziness, to improve skills needed for technology use and scanning environment. ongoing  6) Pt to  perform saccades WFL, in all directions, without onset of headache or nausea, to improve skills needed for reading. ongoing  7) Pt to leisurely read for 15 minutes without eye fatigue, double vision, or increase in headache. ongoing  8) Pt to performed seated bending tasks and head turns/VOR with little to no onset of dizziness. ongoing  9) Pt to perform oculomotor exercises facing busy room, using busy card to decrease sensitivity to busy environments. ongoing     Long Term Goals: 10 weeks   1) Pt will be independent with oculomotor and/or habituation home exercise/activity program. ongoing  2) Pt will participate in work tasks reporting decreased post concussion headache to < / = 2/10 no more than 3x/wk using pacing strategies as needed. ongoing  3) Near point convergence will decrease to 10 cm or less to improve oculomotor coordination and ability to fixate on close up work. ongoing  4) Pt will change gaze between near and far targets without onset of headache or dizziness to improve accommodative flexibility, saccadic eye movements, and oculomotor coordination needed for work, participation in ballet, and to maximize safety while driving. ongoing  5) Pt to leisurely read for 30 minutes without eye fatigue, double vision, or increase in headache. ongoing  6) Pt will demonstrate ability to fixate/attend to close up/distance task x 30 minutes without onset of headache, dizziness, or eye fatigue. ongoing  7) Pt will perform home making tasks such as laundry, cleaning, and cooking with little to no complaints of dizziness. ongoing  8) Pt will be able to engage in busy/loud environment (such as the grocery store/mall) for 30 minutes to increase ability to participate in community mobility and social outings. ongoing    Plan     Certification Period/Plan of care expiration: 10 visits; 1/19/2023 to around 3/30/2023.     Outpatient Occupational Therapy 1 times weekly for 10 weeks to include the following interventions:  Neuromuscular Re-ed, Patient Education, Self Care, Therapeutic Activities, and Therapeutic Exercise.    Updates/Grading for next session: further assess motion tolerance - administer MSQ; administer VOR for motion tolerance; administer further gaze stabilization assessments as neck starts to feel better; gradually progress to open/lighted gym and busy cards       Joyce Rodriguez, OT

## 2023-03-24 ENCOUNTER — CLINICAL SUPPORT (OUTPATIENT)
Dept: REHABILITATION | Facility: HOSPITAL | Age: 53
End: 2023-03-24
Payer: COMMERCIAL

## 2023-03-24 DIAGNOSIS — G44.309 POST-CONCUSSION HEADACHE: ICD-10-CM

## 2023-03-24 DIAGNOSIS — Z74.09 IMPAIRED FUNCTIONAL MOBILITY, BALANCE, AND ENDURANCE: ICD-10-CM

## 2023-03-24 DIAGNOSIS — R29.898 DECREASED ROM OF NECK: Primary | ICD-10-CM

## 2023-03-24 DIAGNOSIS — H53.9 VISION ABNORMALITIES: ICD-10-CM

## 2023-03-24 DIAGNOSIS — F07.81 POST CONCUSSION SYNDROME: Primary | ICD-10-CM

## 2023-03-24 PROCEDURE — 97110 THERAPEUTIC EXERCISES: CPT | Mod: PO

## 2023-03-24 PROCEDURE — 97112 NEUROMUSCULAR REEDUCATION: CPT | Mod: PO

## 2023-03-24 PROCEDURE — 97530 THERAPEUTIC ACTIVITIES: CPT | Mod: PO

## 2023-03-24 PROCEDURE — 97140 MANUAL THERAPY 1/> REGIONS: CPT | Mod: PO

## 2023-03-24 NOTE — PATIENT INSTRUCTIONS
Continue all eye exercises as follows with additions:   - Smooth pursuits, horizontal/vertical/diagonal directions, 1 x 1 minute each   - Saccades at 70 bpm on metronome  Horizontal 1 x 1 minute  Vertical 1 x 40 seconds   Diagonals 1 x 30 seconds  - Continue accommodation task: Choose a near and far target at midline. Change gaze between the two targets ensuring that each come into focus. Perform for 1 minute.   - Continue pencil push ups; holding points that appear double and trying to bring into focus. (will add radha string next session)     Added 3/24/2023    1) First perform covering LEFT eye so that you are only looking at the target with the RIGHT.   2) Next perform covering RIGHT eye so that you are only looking at the target with the LEFT.   *Note: You cannot get double vision when only looking out of one eye, so don't expect the target to double.   3) Finally, perform with BOTH eyes open     Retrieved from:

## 2023-03-24 NOTE — PROGRESS NOTES
"  [facility]   Physical Therapy Treatment Note     Name: Yanelis Lundy  Clinic Number: [id number]    Therapy Diagnosis:   Encounter Diagnoses   Name Primary?    Decreased ROM of neck Yes    Impaired functional mobility, balance, and endurance      Physician: Wei Lin III, MD    Visit Date: 3/24/2023      Physician Orders: PT Eval and Treat vestibular rehab, neck  Medical Diagnosis from Referral: F07.81 (ICD-10-CM) - Post-concussion syndrome  Evaluation Date: 1/19/2023  Authorization Period Expiration: 12/29/2023  Plan of Care Expiration: 3/17/2023 extended to 5/17/2023  Visit # / Visits authorized: 1/20     PN Due: 2/19/23      Precautions: Standard  PTA Visit #: 0/5     Time In: 7:05  Time Out: 7:45  Total Billable Time: 40 minutes    SUBJECTIVE     Pt reports: Pt reports that she is working on using her hat less.  She is wearing her sunglasses at work.  She also reported that she has a migranie this morning from driving tot  clinic in the dark with headlights.  Sound remains a problem.  The "searing " pain in her eyes has been better since starting cymbalta    Response to previous treatment: Pt was OK  Functional change: none    Pain: 6/10, headache 6/10  Location: left upper quarter,       OBJECTIVE     Objective Measures updated at progress report unless specified.   Observation: Pt was able to enter the clinic ambulating independently without an assistive device. She was wearing a hat and sunglasses    Posture: R side of pelvis elevated as compared toe the L    Cervical Range of Motion: see evaluation     Shoulder Range of Motion:   Shoulder Left Right   F   R 140 wnl   Abduction 90 nt   ER 40 nt   IR To abd nt     Strength:  Cervical MMT   Flexion nt   Extension nt   Right Side Bend nt   Left Side Bend nt     Upper Extremity Strength  (R) UE  (L) UE    Shoulder flexion: 5/5 Shoulder flexion: 5/5   Shoulder Abduction: 5/5 Shoulder abduction: 5/5   Shoulder ER 5/5 Shoulder ER " 5/5   Shoulder IR 5/5 Shoulder IR 5/5   Elbow flexion: 5/5 Elbow flexion: 5/5   Elbow extension: 5/5 Elbow extension: 5/5   Wrist flexion: 5/5 Wrist flexion: 5/5   Wrist extension: 5/5 Wrist extension: 5/5    5/5 : 5/5   Lower Trap nt Lower Trap nt   Middle Trap nt Middle Trap nt   Rhomboids nt Rhomboids nt         Special Tests:  Distraction nt   Compression nt   Spurlings nt   Sharp-Niyah nt   VA test nt   Lateral Flexion Alar Ligament nt   DNF test nt     Upper Limb Neurodynamic testing:   Right   Left     S1 S2  S1 S2   BPNT nt/5 nt/5  nt/5 nt/5   UNT nt/5 nt/5  nt/5 nt/5   MNT nt/5 nt/5  nt/5 nt/5   RNT nt/5 nt/5  nt/5 nt/5         Joint Mobility: Cx spine   PA's nt,    Transverse glides WFL,      Thoracic mobility: nt    Palpation: no palpable tenderness      Sensation: increased light touch sense in L thumb and tips of L hand fingers  also decreased in C 1-T2  She reports a dead spot on the L posterior portion of her head.  Flexibility: not tested   Treatment     MARY received the treatments listed below:      therapeutic exercises to develop  for 10 minutes including:  Scapular retractions   Shoulder shrugs  Seated trunk rotation       manual therapy techniques: Joint mobilizations, Manual traction, and Soft tissue Mobilization were applied to the: Cx and thoracic spine and L shoulder for 28 minutes, including:  Passive mobilization to the L GH joint  Manual C spine traction  Soft tissue mobilization to the Cx and thoracic paraspinals  Passive mobilization to the thoracic spine and rib cage.      neuromuscular re-education activities to improve: Balance and Posture for 00 minutes. The following activities were included:      therapeutic activities to improve functional performance for 00  minutes, including:    Patient Education and Home Exercises     Home Exercises Provided and Patient Education Provided     Education provided:   - pt to continue with her present HEP    Written Home Exercises  Provided: Patient instructed to cont prior HEP. Exercises were reviewed and MARY was able to demonstrate them prior to the end of the session.  MARY demonstrated good  understanding of the education provided. See EMR under Patient Instructions for exercises provided during therapy sessions    ASSESSMENT     Pt presents with reported improvement in her condition.  She is working on using either her hat or sunglasses, but still does not tolerate overhead light.  In positioning her on the plinth she noted that she could see the light bulb in the overhead light in the choi and had to be repositioned.  She was treated in a room with the only light was from that in the hallway.  Pt was able to tolerate manual interventions without an increase in her level of discomfort.    MARY Is not progressing well towards her goals.   Pt prognosis is Good.     Pt will continue to benefit from skilled outpatient physical therapy to address the deficits listed in the problem list box on initial evaluation, provide pt/family education and to maximize pt's level of independence in the home and community environment.     Pt's spiritual, cultural and educational needs considered and pt agreeable to plan of care and goals.     Anticipated barriers to physical therapy: scheduling    Goals:   Short Term Goals: 4 weeks   Patient will report compliance with home exercise program for cervical strength/ ROM at least 3x/ week to decrease headache frequency.   Assess Functional Gait Assessment and set goals as needed.   Patient will report decreased headache maximal intensity to 5/10 to demonstrate improved tolerance to daily activities.      Long Term Goals: 8 weeks   Patient will report decreased frequency of headache to 1x/week to demonstrate improved management of symptoms and improved tolerance to daily activities.   Patient will demonstrate improved cervical AROM for rotation to 65 deg to improve driving and ability to return to  dancing.    Patient will demonstrate improved cervical flexor strength to 4/5 with at least a 20 second hold for improved posture to decrease headache occurrence.   PLAN     Progress Physical Therapy treatments to assist Pt with managing her L upper quarter Sx.    Taz Leal, PT

## 2023-03-30 ENCOUNTER — OFFICE VISIT (OUTPATIENT)
Dept: PSYCHIATRY | Facility: CLINIC | Age: 53
End: 2023-03-30
Payer: COMMERCIAL

## 2023-03-30 DIAGNOSIS — F41.1 GENERALIZED ANXIETY DISORDER: ICD-10-CM

## 2023-03-30 DIAGNOSIS — F33.1 MODERATE EPISODE OF RECURRENT MAJOR DEPRESSIVE DISORDER: Primary | ICD-10-CM

## 2023-03-30 DIAGNOSIS — F07.81 POST CONCUSSION SYNDROME: ICD-10-CM

## 2023-03-30 PROCEDURE — 1160F RVW MEDS BY RX/DR IN RCRD: CPT | Mod: CPTII,95,, | Performed by: STUDENT IN AN ORGANIZED HEALTH CARE EDUCATION/TRAINING PROGRAM

## 2023-03-30 PROCEDURE — 1159F PR MEDICATION LIST DOCUMENTED IN MEDICAL RECORD: ICD-10-PCS | Mod: CPTII,95,, | Performed by: STUDENT IN AN ORGANIZED HEALTH CARE EDUCATION/TRAINING PROGRAM

## 2023-03-30 PROCEDURE — 99214 OFFICE O/P EST MOD 30 MIN: CPT | Mod: 95,,, | Performed by: STUDENT IN AN ORGANIZED HEALTH CARE EDUCATION/TRAINING PROGRAM

## 2023-03-30 PROCEDURE — 99214 PR OFFICE/OUTPT VISIT, EST, LEVL IV, 30-39 MIN: ICD-10-PCS | Mod: 95,,, | Performed by: STUDENT IN AN ORGANIZED HEALTH CARE EDUCATION/TRAINING PROGRAM

## 2023-03-30 PROCEDURE — 1159F MED LIST DOCD IN RCRD: CPT | Mod: CPTII,95,, | Performed by: STUDENT IN AN ORGANIZED HEALTH CARE EDUCATION/TRAINING PROGRAM

## 2023-03-30 PROCEDURE — 1160F PR REVIEW ALL MEDS BY PRESCRIBER/CLIN PHARMACIST DOCUMENTED: ICD-10-PCS | Mod: CPTII,95,, | Performed by: STUDENT IN AN ORGANIZED HEALTH CARE EDUCATION/TRAINING PROGRAM

## 2023-03-30 RX ORDER — ALPRAZOLAM 0.5 MG/1
0.5 TABLET ORAL 2 TIMES DAILY
Qty: 60 TABLET | Refills: 0 | Status: SHIPPED | OUTPATIENT
Start: 2023-03-30 | End: 2023-05-31 | Stop reason: SDUPTHER

## 2023-03-30 RX ORDER — DULOXETIN HYDROCHLORIDE 60 MG/1
60 CAPSULE, DELAYED RELEASE ORAL DAILY
Qty: 30 CAPSULE | Refills: 0 | Status: SHIPPED | OUTPATIENT
Start: 2023-03-30 | End: 2023-04-20 | Stop reason: SDUPTHER

## 2023-03-30 RX ORDER — BUPROPION HYDROCHLORIDE 150 MG/1
150 TABLET ORAL DAILY
Qty: 30 TABLET | Refills: 0 | Status: SHIPPED | OUTPATIENT
Start: 2023-03-30 | End: 2023-04-20 | Stop reason: SDUPTHER

## 2023-03-30 NOTE — PROGRESS NOTES
Occupational Therapy Reassessment/Treatment Note     Date: 3/31/2023  Name: Mary Lundy  Clinic Number: [id number]    Therapy Diagnosis:   Encounter Diagnoses   Name Primary?    Post concussion syndrome Yes    Vision abnormalities     Post-concussion headache        Physician: Wei Lin III, MD    Physician Orders: Eval and Treat - oculomotor rehab   Medical Diagnosis: F07.81 (ICD-10-CM) - Post-concussion syndrome  Insurance Authorization Period Expiration: 12/31/2023  Plan of Care Certification Period: 10 visits; around 3/30/2022  Date of Return to MD: 4/13/2023 Dr. Lin    Visit # / Visits authorized: 4 / 20 Private Insurance   Time In: 0749  Time Out: 0835  Total Billable (one on one) Time: 46 minutes    Precautions: Standard    Subjective     Pt reports: that her headaches have been better since starting new medication. She has only been having 3-4 migraines a week and only 2 really bad one. She reported that the medication has helped her in more ways than just the headaches.   She was compliant with home exercise program given last session.   Response to previous treatment: eye exercises are getting easier, headaches are more manageable   Functional change: ongoing    Pain: 0/10  Location: Headache     Headache Averages: Frequency: 3-4x/week; Intensity: Worse later in the work week, 3/10 on average but typically more so a dull annoyance, certain situations/environments exacerbate headaches; Duration: Varies, sometimes headaches can be constant but other times will go away     Date of Onset: 4 total concussions; most recent in 2018    Objective      MARY participated in neuromuscular re-education activities to improve: oculomotor function for 46 minutes. The following activities were included:  Reassessment/Assessment:   Oculomotor Exam  Vestibular/Ocular-Motor Screening (VOMS) for Concussion  Vestibular/Ocular Motor Test: Not Tested Headache   0-10 Dizziness  0-10 Nausea   0-10 Fogginess   0-10  Comments   Baseline   2 0 1 0     Smooth Pursuit  (1.5 feet left/right of center; 3 feet away; 2 times; 30 bpm each direction; horizontal and vertical)   3 0 2 0 See below   Saccades - Horizontal  (1.5 feet left/right of center; 3 feet away; 10 times; performed as quickly as possible)   4 0 3 0 See below    Saccades - Vertical   (1.5 feet up/down of center; 3 feet away; 10 times; performed as quickly as possible)   5 0 6 0 See below   Convergence (Near Point)  (14 pt font; stop when pt reports diplopia or outward deviation of eye is observed, blurring is ignored; measure distance between target and tip of nose; abnormal finding is >/= 6 cm)   6 0 5 0 (Near Point in cm):  Measure 1: 10.5 (w glasses)  Measure 2: 14 (w/ glasses)  Measure 3: 20 (w/ glasses)    VOR - Horizontal  (14 pt font; 20 degrees rotation left/right; 10 reps; 180 bpm)   6 8 8 0 No visual slippage; performed at appropriate speed   VOR - Vertical  (14 pt font; 20 degrees rotation up/down; 10 times; 180 bpm)   6 8 8 0 No visual slippage, but pt performed appropriate speed    Visual Motion Sensitivity Test  (80 degrees left/right; 5 times each direction; 50 bpm)   8 6 9 0 No saccadic intrusions but pt reported eye pain       Oculomotor ROM: WNL, but pt reported double vision in RUQ   Eye Alignment: WNL  Visual Field: NT  Acuity: prism and filter lenses; has been seen by neuro opthalmology     Spontaneous Nystagmus: None  Gaze Holding Nystagmus: None  Gaze Holding (No Fixation): NT  Smooth Pursuits:   Horizontal: Smooth eye movements; no diplopia reported               Vertical: Smooth eye movements; no diplopia reported   Saccades:               Horizontal: Smooth eye movements; performed at consistent but slower pace; increased headache and nausea reported               Vertical: Smooth eye movements; performed at consistent but slower pace; increased headache and nausea reported; pt also reported high target became double towards the end of  "repetitions   Near Point Convergence (cm):   Eval: Impaired; Average 19 cm; right eye more difficult to converge    3/31/3023: Impaired; Average 14.8 cm   Accommodation (gaze shift between near target (16") and far target (10ft)): Appropriate gaze shifts (slightly slower at 7/10 reps); pt reported no increase in nausea but increase in eye pain   Fixation (5 second sustained visual attention): Impaired for brief and prolonged periods; taking stand up/walking breaks from computer every 30-50 minutes but looks away from screen every 15 minutes at least      VOR Slow Head Movement:   Eval: Intact   3/31/2023: NT  Head Thrust:   Eval: Grossly intact bilaterally; occasional visual slippage with head left but infrequent   3/31/2023: NT  Dynamic Visual Acuity (DVA): NT     Comments: Pt has eye plugs, has a history of right lazy eye, and eyes do not work together (eyes do not see objects as the same shape; pt provided the following example: "one eye will see a tennis ball as the size of a walnut and the other will see it as a grapefruit"). These deficits are chronic.     Motion Sensitivity Testing    Movement Intensity (0-5) Duration  < 5 = 0  5 - 10s = 1  11 - 30s = 2  >30s = 3 Score  Intensity + duration score   1. Sitting to Supine 0/5 0 0   2. Supine to left side 0/5 0 0   3. Supine to right side 1/5 1 2   4. Supine to sitting 1/5 1 2   5. L [place] 3/5 0 3   6. Up from left 2/5 0 2   7. R [place] 3/5 0 3   8. Up from right 3/5 0 0   9. Sitting, head tipped to L knee 3/5 0 3   10. Head up from left knee 4/5 0 4   11. Sitting, head tipped to R knee 0/5 0 0   12. Head up from right knee 3/5 1 4   13. Sitting head turns (5) 3/5 1 4   14. Sitting head pitches (5) 2/5 0 2   15. In stance, 180 degree turn to L 0/5 0 0   16. In stance, 180 degree turn to R 0/5 0 0     MSQ = Total score x (# of positions with symptoms)/20.48    MSQ = 14.2    Interpretation:   Mild: 0 - 10  Moderate: 11 - 30  Severe: 31 - " "100    Treatment:  Oculomotor:  - Monocular walking convergence, covering left eye, x5 reps   - Binocular walking convergence, x5 reps     Home Exercises and Education Provided      Education provided:   - HEP  - Progress towards goals    Written Home Exercises Provided: Patient instructed to cont prior HEP.  Exercises were reviewed and MARY was able to demonstrate them prior to the end of the session.    MARY demonstrated good  understanding of the education provided.     See EMR under Patient Instructions for exercises provided prior visit.  3/17/2023: oculomotor progressions (see pt instructions); accommodation activity   3/24/2023: oculomotor progressions (see pt instructions); walking monocular and binocular convergence     Assessment      Pt continues with headaches, light sensitivity, oculomotor dysfunction, and motion intolerance limiting functional task performance. Performed reassessment this date and further assessed pt's motion tolerance with the MSQ. Pt with smooth eye movements during pursuits and saccades, and NPC improved by 4 cm with notable improvement of convergence of pt's right eye. However, headache, dizziness, and nausea increased throughout the VOMS assessment. Did not reassess head thrust secondary to heightened symptoms post VOMS. MSQ score indicates "moderate" impairment. Overall, headaches are improving; medication prescribed by pt's neurologist is having a positive effect. Pt is compliant with HEP, is understanding of eye ergonomics, and is gradually making independent modifications to work environment for increased exposure to light. Pt is making good progress towards goals; status has been updated below. Pt would continue to benefit from skilled occupational therapy services to maximize oculomotor functioning and gaze stabilization, habituation for desensitization to environments/movements that elicit/increase symptoms, pt education on mindfulness/relaxation strategies, and " HEP/HAP guidance to improve functional participation with meaningful occupations.    MARY is progressing well towards her goals and there are no updates to goals at this time. Pt prognosis is Fair-Guarded; Fair as pt has made improvements over the past month; Guarded due to hx of 4 concussions and underlying visual deficits      Pt will continue to benefit from skilled outpatient occupational therapy to address the deficits listed in the problem list on initial evaluation provide pt/family education and to maximize pt's level of independence in the home and community environment.     Pt's spiritual, cultural and educational needs considered and pt agreeable to plan of care and goals.    Anticipated barriers to occupational therapy: none noted     Goals:  Short Term Goals: 5 weeks   1) Pt will tolerate oculomotor and/or habitation home exercise/activity program, reporting at least 50% compliance. MET 3/31/2023  2) Pt will pace work tasks reported decreased post concussion headache to < / = 4/10 no more than 5x/wk. MET 3/31/2023  3) Pt will verbalize eye ergonomics to decrease stress on eyes. MET 3/31/2023  4) Near point convergence will decrease to 15 cm or less to improve oculomotor coordination and ability to fixate on close up work. MET 3/31/2023  5) Pt will demonstrate ability to track single target WFL, in all directions, without onset of headache or dizziness, to improve skills needed for technology use and scanning environment. ongoing  6) Pt to perform saccades WFL, in all directions, without onset of headache or nausea, to improve skills needed for reading. ongoing  7) Pt to leisurely read for 15 minutes without eye fatigue, double vision, or increase in headache. ongoing  8) Pt to performed seated bending tasks and head turns/VOR with little to no onset of dizziness. ongoing  9) Pt to perform oculomotor exercises facing busy room, using busy card to decrease sensitivity to busy environments. ongoing      Long Term Goals: 10 weeks   1) Pt will be independent with oculomotor and/or habituation home exercise/activity program. ongoing  2) Pt will participate in work tasks reporting decreased post concussion headache to < / = 2/10 no more than 3x/wk using pacing strategies as needed. ongoing  3) Near point convergence will decrease to 10 cm or less to improve oculomotor coordination and ability to fixate on close up work. ongoing  4) Pt will change gaze between near and far targets without onset of headache or dizziness to improve accommodative flexibility, saccadic eye movements, and oculomotor coordination needed for work, participation in ballet, and to maximize safety while driving. ongoing  5) Pt to leisurely read for 30 minutes without eye fatigue, double vision, or increase in headache. ongoing  6) Pt will demonstrate ability to fixate/attend to close up/distance task x 30 minutes without onset of headache, dizziness, or eye fatigue. ongoing  7) Pt will perform home making tasks such as laundry, cleaning, and cooking with little to no complaints of dizziness. ongoing  8) Pt will be able to engage in busy/loud environment (such as the grocery store/mall) for 30 minutes to increase ability to participate in community mobility and social outings. ongoing    Plan     Certification Period/Plan of care expiration: 10 visits; 1/19/2023 to around 3/30/2023.     Outpatient Occupational Therapy 1 times weekly for 10 weeks to include the following interventions: Neuromuscular Re-ed, Patient Education, Self Care, Therapeutic Activities, and Therapeutic Exercise.    Updates/Grading for next session: start motion tolerance activities and add VOR cancellation to HEP; administer VOR for motion tolerance; administer further gaze stabilization assessments as neck starts to feel better; gradually progress to open/lighted gym and busy cards       Joyce Rodriguez, OT
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
## 2023-03-31 ENCOUNTER — CLINICAL SUPPORT (OUTPATIENT)
Dept: REHABILITATION | Facility: HOSPITAL | Age: 53
End: 2023-03-31
Payer: COMMERCIAL

## 2023-03-31 DIAGNOSIS — G44.309 POST-CONCUSSION HEADACHE: ICD-10-CM

## 2023-03-31 DIAGNOSIS — R41.841 COGNITIVE COMMUNICATION DEFICIT: Primary | ICD-10-CM

## 2023-03-31 DIAGNOSIS — H53.9 VISION ABNORMALITIES: ICD-10-CM

## 2023-03-31 DIAGNOSIS — R29.898 DECREASED ROM OF NECK: Primary | ICD-10-CM

## 2023-03-31 DIAGNOSIS — F07.81 POST CONCUSSION SYNDROME: Primary | ICD-10-CM

## 2023-03-31 DIAGNOSIS — Z74.09 IMPAIRED FUNCTIONAL MOBILITY, BALANCE, AND ENDURANCE: ICD-10-CM

## 2023-03-31 PROCEDURE — 97129 THER IVNTJ 1ST 15 MIN: CPT | Mod: PO

## 2023-03-31 PROCEDURE — 97110 THERAPEUTIC EXERCISES: CPT | Mod: PO

## 2023-03-31 PROCEDURE — 97130 THER IVNTJ EA ADDL 15 MIN: CPT | Mod: PO

## 2023-03-31 PROCEDURE — 97112 NEUROMUSCULAR REEDUCATION: CPT | Mod: PO

## 2023-03-31 PROCEDURE — 97140 MANUAL THERAPY 1/> REGIONS: CPT | Mod: PO

## 2023-03-31 NOTE — PATIENT INSTRUCTIONS
"Home Program Recommendations:  - use sticky notes with "what am I doing / where am I going?" Written on them to help with alternating between projects suddenly  - set up rules on outlook calendar with alerts for specific people  - disable popups for all emails to reduce visual distractions   - plan week and work time strategically to split focus between projects vs. Responding to requests in real time   "

## 2023-03-31 NOTE — PROGRESS NOTES
[facility]   Physical Therapy Treatment Note     Name: Mary Lundy  Clinic Number: [id number]    Therapy Diagnosis:   Encounter Diagnoses   Name Primary?    Decreased ROM of neck Yes    Impaired functional mobility, balance, and endurance      Physician: Wei Lin III, MD    Visit Date: 3/31/2023      Physician Orders: PT Eval and Treat vestibular rehab, neck  Medical Diagnosis from Referral: F07.81 (ICD-10-CM) - Post-concussion syndrome  Evaluation Date: 1/19/2023  Authorization Period Expiration: 12/29/2023  Plan of Care Expiration: 3/17/2023 extended to 5/17/2023  Visit # / Visits authorized: 2/20     PN Due: 2/19/23      Precautions: Standard  PTA Visit #: 0/5     Time In: 7:05  Time Out: 7:45  Total Billable Time: 40 minutes    SUBJECTIVE     Pt reports: Pt reports that she went to a [employer] dinner on Wednesday with a lot of people.  She stated that she had a good time, but must have been guarding in hr posture as her neck and L shoulder were sore and tight when she woke Thursday morning.  She reports that she continues to work on using her sunglasses/hat and ear plugs as little as she can.   Response to previous treatment: Pt was OK  Functional change: none    Pain: 4/10, headache 4/10  Location: left upper quarter,       OBJECTIVE     Objective Measures updated at progress report unless specified.   Observation: Pt was able to enter the clinic ambulating independently without an assistive device no hat or sun glasses today    Treatment     MARY received the treatments listed below:      therapeutic exercises to develop  for 15 minutes including:  Scapular retractions x 10   Shoulder shrugs x 10  Seated trunk rotation x 10  Active L elbow flexion with wrist flexion to elbow extension with wrist extensino      manual therapy techniques: Joint mobilizations, Manual traction, and Soft tissue Mobilization were applied to the: Cx and thoracic spine and L shoulder for 25  minutes, including:  Passive mobilization to the L GH joint  Manual C spine traction  Soft tissue mobilization to the Cx and thoracic paraspinals  Passive mobilization to the thoracic spine and rib cage.      neuromuscular re-education activities to improve: Balance and Posture for 00 minutes. The following activities were included:      therapeutic activities to improve functional performance for 00  minutes, including:    Patient Education and Home Exercises     Home Exercises Provided and Patient Education Provided     Education provided:   - pt to continue with her present HEP    Written Home Exercises Provided: Patient instructed to cont prior HEP. Exercises were reviewed and MARY was able to demonstrate them prior to the end of the session.  MARY demonstrated good  understanding of the education provided. See EMR under Patient Instructions for exercises provided during therapy sessions    ASSESSMENT     Pt presents with L Cx spine and L shoulder discomfort. She was tender to palpation between her L scalenes and lateral L elbow.  She was able to tolerate L UE nerve glides for 8 repetitions.  Pt was able to tolerate exercises today and has been compliant with her HEP.  Progress exercise and soft tissue mobilization as tolerated.      MARY Is not progressing well towards her goals.   Pt prognosis is Good.     Pt will continue to benefit from skilled outpatient physical therapy to address the deficits listed in the problem list box on initial evaluation, provide pt/family education and to maximize pt's level of independence in the home and community environment.     Pt's spiritual, cultural and educational needs considered and pt agreeable to plan of care and goals.     Anticipated barriers to physical therapy: scheduling    Goals:   Short Term Goals: 4 weeks   Patient will report compliance with home exercise program for cervical strength/ ROM at least 3x/ week to decrease headache frequency.   Assess  Functional Gait Assessment and set goals as needed.   Patient will report decreased headache maximal intensity to 5/10 to demonstrate improved tolerance to daily activities.      Long Term Goals: 8 weeks   Patient will report decreased frequency of headache to 1x/week to demonstrate improved management of symptoms and improved tolerance to daily activities.   Patient will demonstrate improved cervical AROM for rotation to 65 deg to improve driving and ability to return to dancing.    Patient will demonstrate improved cervical flexor strength to 4/5 with at least a 20 second hold for improved posture to decrease headache occurrence.   PLAN     Progress Physical Therapy treatments to assist Pt with managing her L upper quarter Sx.    Taz Leal, PT

## 2023-03-31 NOTE — PROGRESS NOTES
"[facility]  Speech Therapy Treatment Note- Neurological Rehabilitation  Date: 3/31/2023     Name: Yanelis Lundy   MRN: [id number]   Therapy Diagnosis:   Encounter Diagnosis   Name Primary?    Cognitive communication deficit Yes     Physician: Wei Lin III, MD  Physician Orders: Ambulatory Referral to Speech Therapy   Medical Diagnosis: Post-Concussion Syndrome    Visit #/ Visits Authorized: 3/ 20  Date of Evaluation:  1/19/2023  Insurance Authorization Period: 1/19/2023 - 12/9/2024  Plan of Care Expiration Date:    3/16/2023  Extended Plan of Care:  4/14/23  Progress Note: 2/19/2023   Visits Cancelled: 0  Visits No Show: 0    Time In:  836   Time Out:  918   Total Billable Time: 42 minutes      Precautions: Standard  Subjective:   Patient reports: very dependent on timers and that she is failing at managing her spoons.    She was compliant to home exercise program.     Duration of session completed with light out and door open to hallway due to light sensitivity.   Response to previous treatment: none   Pain Scale:  did not rate pain today  Objective:   TIMED  Procedure Min.   Cognitive Therapeutic Interventions, first 15 minutes CPT 09606  15   Cognitive Therapeutic Interventions, each additional 15 minutes CPT 86490  27   Total Timed Units: 3  Charges Billed/Number of units: 3    Short Term Goals: (4 weeks) Current Progress:   1. Patient will complete selective and alternating attention tasks (auditory or visual) with 90% accuracy independently increase selective attention.    Progressing/ Not Met 3/31/2023   Dicussed alternating attention strategies. Discussed using strategy of "What am I doing? Where am I going?" On post it notes for alternating attention while in work environment.     Yanelis's work environment requires her to rapidly transfer between several cognitive tasks with high cognitive demand throughout the day.    2. Patient will complete moderate to complex reasoning tasks for " 2 minutes with one request for clarification to increase sustained attention.    Progressing/ Not Met 3/31/2023      3. Patient will use Goal Plan Action Review strategy to complete moderate to complex reasoning, planning, or organization tasks with 90% accuracy independently to improve functional executive function skills.    Progressing/ Not Met 3/31/2023   Education today provided re: organization of day at work to better manage cognitive load. Discussed organization of outlook calendar to be sorted into folders automatically; setting up alerts for emails that typically require immediate attention; color coding calendar; schedule blocking her day based on case.     4. Patient will complete short term recall tasks after a 5 minutes delay with 90% accuracy  independently  with use of memory strategies to improve recall of information and generalization of memory strategies.    Progressing/ Not Met 3/31/2023      5. Patient will independently implement cognitive/sensory rest periods throughout day after identifying cognitive fatigue including limiting sensory input (sound, light, etc.)   Progressing/ Not Met 3/31/2023      6. Patient will complete a task to improve attention and memory (I.e. sample bill paying activity, recipe, or multiple choice comprehension questions to 1 paragraphs) with 80% accuracy and natural environment noise distractions (TV news background, music, etc.).     Progressing/ Not Met 3/31/2023          Patient Education/Response:   Patient educated regarding the followin. External aids for alternating attention  2. Reducing visual distractions   3. Organizing her schedule to best incorporate breaks and cognitive load   Mary verbalized understanding to all discussed.    Patient verbalized understanding to all above education provided.    See Electronic Medical Record under Patient Instructions for exercises provided throughout therapy.  Assessment:   MARY is progressing well towards  her goals. Session was performed in reduced lighting. Strategy use and implementation discussed at length today. Good willingness to implement new strategies.     Patient prognosis is Fair. Patient will continue to benefit from skilled outpatient speech and language therapy to address the deficits listed in the problem list on initial evaluation, provide patient/family education and to maximize patient's level of independence in the home and community environment.   Medical necessity is demonstrated by the following IMPAIRMENTS:  Deficits in executive functioning, attention, and memory prevent the patient performing herwork and personal daily activities effectively and efficiently which may put her at risk of unsafe behavior and a decline in quality of life.   Barriers to Therapy: none  Patient's spiritual, cultural and educational needs considered and patient agreeable to plan of care and goals.  Plan:   Continue Plan of Care with focus on cognitive communication deficits.   LAURITA Gordon, CCC-SLP   3/31/2023

## 2023-04-03 NOTE — PROGRESS NOTES
Occupational Therapy   Treatment Note     Date: 4/4/2023  Name: Mary Lundy  Clinic Number: [id number]    Therapy Diagnosis:   Encounter Diagnoses   Name Primary?    Post concussion syndrome Yes    Vision abnormalities     Post-concussion headache      Physician: Wei Lin III, MD    Physician Orders: Eval and Treat - oculomotor rehab   Medical Diagnosis: F07.81 (ICD-10-CM) - Post-concussion syndrome  Insurance Authorization Period Expiration: 12/31/2023  Re-assessment completed 3/31/2023  Plan of Care Certification Period: 10 visits  Date of Return to MD: 5/2/2023: Neuro opthalmology     Visit # / Visits authorized: 5 / 20 (+evaluation) Private Insurance   Time In: 8:55  Time Out: 9:33  Total Billable (one on one) Time: 38 minutes    Precautions: Standard    Subjective     Pt reports: wearing a hat or sunglasses but not both to assist in her light sensitivity    She was compliant with home exercise program given last session.   Response to previous treatment: eye exercises are getting easier, headaches are more manageable   Functional change: ongoing    Pain: 2-3/10  Location: cervical   No Headache upon arrival     Headache Averages: Frequency: 3-4x/week; Intensity: Worse later in the work week, 3/10 on average but typically more so a dull annoyance, certain situations/environments exacerbate headaches; Duration: Varies, sometimes headaches can be constant but other times will go away     Date of Onset: 4 total concussions; most recent in 2018    Objective    Received from PT and following restroom break     MARY participated in neuromuscular re-education activities to improve: oculomotor function for 38 minutes. The following activities were included:  Seated in private room with dim light ; index card B   Saccades at 80 bpm   Horizontal, vertical and B diagonals 1 x 60 s   Note: monocular and binocular completed for upper right diagonal  - VORx1: horizontal and vertical directions x1 min duration at 100  bpm   - VOR cancellation in sitting- horizontal- self selected pace (excess auditory stimuli in hallway behind)    Ocular palming rest breaks as needed throughout       Home Exercises and Education Provided      Education provided:   - HEP  - Progress towards goals    Written Home Exercises Provided: Patient instructed to cont prior HEP.  Exercises were reviewed and MARY was able to demonstrate them prior to the end of the session.    MARY demonstrated good  understanding of the education provided.     See EMR under Patient Instructions for exercises provided prior visit.  3/17/2023: oculomotor progressions (see pt instructions); accommodation activity   3/24/2023: oculomotor progressions (see pt instructions); walking monocular and binocular convergence     Assessment    Mary remains requiring dim lit private room for therapy sessions. She requires modifications for exercises due to poor ocular control for right eye. Right eye with reported monocular diplopia in right with upper right quadrant & lower left saccades. She is currently on wait list for neuro opthalmology appointment at this time.   Pt would continue to benefit from skilled occupational therapy services to maximize oculomotor functioning and gaze stabilization, habituation for desensitization to environments/movements that elicit/increase symptoms, pt education on mindfulness/relaxation strategies, and HEP/HAP guidance to improve functional participation with meaningful occupations.    MARY is progressing well towards her goals and there are no updates to goals at this time. Pt prognosis is Fair-Guarded; Fair as pt has made improvements over the past month; Guarded due to hx of 4 concussions and underlying visual deficits      Pt will continue to benefit from skilled outpatient occupational therapy to address the deficits listed in the problem list on initial evaluation provide pt/family education and to maximize pt's level of independence  in the home and community environment.     Pt's spiritual, cultural and educational needs considered and pt agreeable to plan of care and goals.    Anticipated barriers to occupational therapy: none noted     Goals:  Short Term Goals: 5 weeks   1) Pt will tolerate oculomotor and/or habitation home exercise/activity program, reporting at least 50% compliance. MET 3/31/2023  2) Pt will pace work tasks reported decreased post concussion headache to < / = 4/10 no more than 5x/wk. MET 3/31/2023  3) Pt will verbalize eye ergonomics to decrease stress on eyes. MET 3/31/2023  4) Near point convergence will decrease to 15 cm or less to improve oculomotor coordination and ability to fixate on close up work. MET 3/31/2023  5) Pt will demonstrate ability to track single target WFL, in all directions, without onset of headache or dizziness, to improve skills needed for technology use and scanning environment. ongoing  6) Pt to perform saccades WFL, in all directions, without onset of headache or nausea, to improve skills needed for reading. ongoing  7) Pt to leisurely read for 15 minutes without eye fatigue, double vision, or increase in headache. ongoing  8) Pt to performed seated bending tasks and head turns/VOR with little to no onset of dizziness. ongoing  9) Pt to perform oculomotor exercises facing busy room, using busy card to decrease sensitivity to busy environments. ongoing     Long Term Goals: 10 weeks   1) Pt will be independent with oculomotor and/or habituation home exercise/activity program. ongoing  2) Pt will participate in work tasks reporting decreased post concussion headache to < / = 2/10 no more than 3x/wk using pacing strategies as needed. ongoing  3) Near point convergence will decrease to 10 cm or less to improve oculomotor coordination and ability to fixate on close up work. ongoing  4) Pt will change gaze between near and far targets without onset of headache or dizziness to improve accommodative  flexibility, saccadic eye movements, and oculomotor coordination needed for work, participation in ballet, and to maximize safety while driving. ongoing  5) Pt to leisurely read for 30 minutes without eye fatigue, double vision, or increase in headache. ongoing  6) Pt will demonstrate ability to fixate/attend to close up/distance task x 30 minutes without onset of headache, dizziness, or eye fatigue. ongoing  7) Pt will perform home making tasks such as laundry, cleaning, and cooking with little to no complaints of dizziness. ongoing  8) Pt will be able to engage in busy/loud environment (such as the grocery store/mall) for 30 minutes to increase ability to participate in community mobility and social outings. ongoing    Plan     Certification Period/Plan of care expiration: 10 visits      Outpatient Occupational Therapy 1 times weekly for 10 weeks to include the following interventions: Neuromuscular Re-ed, Patient Education, Self Care, Therapeutic Activities, and Therapeutic Exercise.    Updates/Grading for next session: start motion tolerance activities and add VOR cancellation to HEP; administer VOR for motion tolerance; administer further gaze stabilization assessments as neck starts to feel better; gradually progress to open/lighted gym and busy cards       ALFREDO Fink

## 2023-04-04 ENCOUNTER — CLINICAL SUPPORT (OUTPATIENT)
Dept: REHABILITATION | Facility: HOSPITAL | Age: 53
End: 2023-04-04
Payer: COMMERCIAL

## 2023-04-04 DIAGNOSIS — F07.81 POST CONCUSSION SYNDROME: Primary | ICD-10-CM

## 2023-04-04 DIAGNOSIS — G44.309 POST-CONCUSSION HEADACHE: ICD-10-CM

## 2023-04-04 DIAGNOSIS — R29.898 DECREASED ROM OF NECK: Primary | ICD-10-CM

## 2023-04-04 DIAGNOSIS — Z74.09 IMPAIRED FUNCTIONAL MOBILITY, BALANCE, AND ENDURANCE: ICD-10-CM

## 2023-04-04 DIAGNOSIS — H53.9 VISION ABNORMALITIES: ICD-10-CM

## 2023-04-04 PROCEDURE — 97110 THERAPEUTIC EXERCISES: CPT | Mod: PO

## 2023-04-04 PROCEDURE — 97112 NEUROMUSCULAR REEDUCATION: CPT | Mod: PO

## 2023-04-04 PROCEDURE — 97140 MANUAL THERAPY 1/> REGIONS: CPT | Mod: PO

## 2023-04-04 NOTE — PLAN OF CARE
[facility]   Physical Therapy Treatment Note     Name: Yanelis Lundy  Clinic Number: [id number]    Therapy Diagnosis:   Encounter Diagnoses   Name Primary?    Decreased ROM of neck Yes    Impaired functional mobility, balance, and endurance      Physician: Wei Lin III, MD    Visit Date: 3/17/2023      Physician Orders: PT Eval and Treat vestibular rehab, neck  Medical Diagnosis from Referral: F07.81 (ICD-10-CM) - Post-concussion syndrome  Evaluation Date: 1/19/2023  Authorization Period Expiration: 12/29/2023  Plan of Care Expiration: 3/17/2023 extend to 5/17/2023  Visit # / Visits authorized: 1/20     PN Due: 2/19/23      Precautions: Standard  PTA Visit #: 0/5     Time In: 7:05  Time Out: 7:45  Total Billable Time: 40 minutes    SUBJECTIVE     Pt reports: Pt reports that she has gotten to where she can handle light and sound a little better.  Light is tolerated with a hat or sunglasses.  She still does not tolerate over head light well.  She was compliant with home exercise program.  Response to previous treatment: Pt was OK  Functional change: none    Pain: 5/10, headache 6/10  Location: left upper quarter,       OBJECTIVE     Objective Measures updated at progress report unless specified.   Observation: Pt was able to enter the clinic ambulating independently without an assistive device. She was wearing a hat and sunglasses    Posture: R side of pelvis elevated as compared toe the L    Cervical Range of Motion: see evaluation     Shoulder Range of Motion:   Shoulder Left Right   F   R 140 wnl   Abduction 90 nt   ER 40 nt   IR To abd nt     Strength:  Cervical MMT   Flexion nt   Extension nt   Right Side Bend nt   Left Side Bend nt     Upper Extremity Strength  (R) UE  (L) UE    Shoulder flexion: 5/5 Shoulder flexion: 5/5   Shoulder Abduction: 5/5 Shoulder abduction: 5/5   Shoulder ER 5/5 Shoulder ER 5/5   Shoulder IR 5/5 Shoulder IR 5/5   Elbow flexion: 5/5 Elbow flexion: 5/5    Elbow extension: 5/5 Elbow extension: 5/5   Wrist flexion: 5/5 Wrist flexion: 5/5   Wrist extension: 5/5 Wrist extension: 5/5    5/5 : 5/5   Lower Trap nt Lower Trap nt   Middle Trap nt Middle Trap nt   Rhomboids nt Rhomboids nt         Special Tests:  Distraction nt   Compression nt   Spurlings nt   Sharp-Niyah nt   VA test nt   Lateral Flexion Alar Ligament nt   DNF test nt     Upper Limb Neurodynamic testing:   Right   Left     S1 S2  S1 S2   BPNT nt/5 nt/5  nt/5 nt/5   UNT nt/5 nt/5  nt/5 nt/5   MNT nt/5 nt/5  nt/5 nt/5   RNT nt/5 nt/5  nt/5 nt/5         Joint Mobility: Cx spine   PA's nt,    Transverse glides WFL,      Thoracic mobility: nt    Palpation: no palpable tenderness      Sensation: increased light touch sense in L thumb and tips of L hand fingers  also decreased in C 1-T2  She reports a dead spot on the L posterior portion of her head.  Flexibility: not tested   Treatment   Continuation of evaluation = 17 min  MARY received the treatments listed below:      therapeutic exercises to develop  for 00 minutes including:      manual therapy techniques: Joint mobilizations, Manual traction, and Soft tissue Mobilization were applied to the: Cx and thoracic spine and L shoulder for 23 minutes, including:  Passive mobilization to the L GH joint  Manual C spine traction  Soft tissue mobilization to the Cx and thoracic paraspinals  Passive mobilization to the thoracic spine and rib cage.      neuromuscular re-education activities to improve: Balance and Posture for 00 minutes. The following activities were included:      therapeutic activities to improve functional performance for 00  minutes, including:    Patient Education and Home Exercises     Home Exercises Provided and Patient Education Provided     Education provided:   - pt to continue with her present HEP    Written Home Exercises Provided: Patient instructed to cont prior HEP. Exercises were reviewed and MARY was able to demonstrate  them prior to the end of the session.  MARY demonstrated good  understanding of the education provided. See EMR under Patient Instructions for exercises provided during therapy sessions    ASSESSMENT     Pt presents with reported improvement in her condition.  She reported that she is better at tolerating environmental input such as light sound.  She was treated in a room without an overhead on.  The only light entered through a open do with light from the hallway.  Pt was able to tolerate manual interventions without an increase in her level of discomfort.    MARY Is not progressing well towards her goals.   Pt prognosis is Good.     Pt will continue to benefit from skilled outpatient physical therapy to address the deficits listed in the problem list box on initial evaluation, provide pt/family education and to maximize pt's level of independence in the home and community environment.     Pt's spiritual, cultural and educational needs considered and pt agreeable to plan of care and goals.     Anticipated barriers to physical therapy: scheduling    Goals:   Short Term Goals: 4 weeks   Patient will report compliance with home exercise program for cervical strength/ ROM at least 3x/ week to decrease headache frequency.   Assess Functional Gait Assessment and set goals as needed.   Patient will report decreased headache maximal intensity to 5/10 to demonstrate improved tolerance to daily activities.      Long Term Goals: 8 weeks   Patient will report decreased frequency of headache to 1x/week to demonstrate improved management of symptoms and improved tolerance to daily activities.   Patient will demonstrate improved cervical AROM for rotation to 65 deg to improve driving and ability to return to dancing.    Patient will demonstrate improved cervical flexor strength to 4/5 with at least a 20 second hold for improved posture to decrease headache occurrence.   PLAN   Extend physical therapy Plan of Care to  5/17/2023  Progress Physical Therapy treatments to assist Pt with managing her L upper quarter Sx.    Taz Leal, PT

## 2023-04-04 NOTE — PROGRESS NOTES
[facility]   Physical Therapy Treatment Note     Name: Mary Lundy  Clinic Number: [id number]    Therapy Diagnosis:   Encounter Diagnoses   Name Primary?    Decreased ROM of neck Yes    Impaired functional mobility, balance, and endurance      Physician: Wei Lin III, MD    Visit Date: 4/4/2023      Physician Orders: PT Eval and Treat vestibular rehab, neck  Medical Diagnosis from Referral: F07.81 (ICD-10-CM) - Post-concussion syndrome  Evaluation Date: 1/19/2023  Authorization Period Expiration: 12/29/2023  Plan of Care Expiration: 3/17/2023 extended to 5/17/2023  Visit # / Visits authorized: 3/20     PN Due: 2/19/23      Precautions: Standard  PTA Visit #: 0/5     Time In: 8:07  Time Out: 8:45  Total Billable Time: 40 minutes    SUBJECTIVE     Pt reports: Pt reports that increased Cymbalta has helped with L UE radicular Sx and her IBS.  Response to previous treatment: Pt was OK  Functional change: ongoing    Pain: 4/10, headache 4/10  Location: left upper quarter,       OBJECTIVE     Objective Measures updated at progress report unless specified.   Observation: Pt was able to enter the clinic ambulating independently without an assistive device no hat or sun glasses today    Treatment   Bolded = performed  MARY received the treatments listed below:      therapeutic exercises to develop  for 15 minutes including:  Standing rows with red TB 10 x 3  Scapular retractions x 10   Shoulder shrugs x 10  Seated trunk rotation x 10  Active L elbow flexion with wrist flexion to elbow extension with wrist extensino      manual therapy techniques: Joint mobilizations, Manual traction, and Soft tissue Mobilization were applied to the: Cx and thoracic spine and L shoulder for 25 minutes, including:  Passive mobilization to the L GH joint  Manual C spine traction  Soft tissue mobilization to the Cx and thoracic paraspinals  Passive mobilization to the thoracic spine and rib  cage.      neuromuscular re-education activities to improve: Balance and Posture for 00 minutes. The following activities were included:      therapeutic activities to improve functional performance for 00  minutes, including:    Patient Education and Home Exercises     Home Exercises Provided and Patient Education Provided     Education provided:   - Rows with yellow TB 5-10 x 2 sets    Written Home Exercises Provided: Patient instructed to cont prior HEP. Exercises were reviewed and MARY was able to demonstrate them prior to the end of the session.  MARY demonstrated good  understanding of the education provided. See EMR under Patient Instructions for exercises provided during therapy sessions    ASSESSMENT     Pt presents with reports of overall improvement following an increase in her Cymbalta over the weekend She was tender to palpation between her L scalenes and lateral L elbow.  She was able to tolerate standing rows 10 x 3 with red TB without an increase in her pain level..  Pt was able to tolerate exercises today and has been compliant with her HEP.  Progress exercise and soft tissue mobilization as tolerated.      MARY Is not progressing well towards her goals.   Pt prognosis is Good.     Pt will continue to benefit from skilled outpatient physical therapy to address the deficits listed in the problem list box on initial evaluation, provide pt/family education and to maximize pt's level of independence in the home and community environment.     Pt's spiritual, cultural and educational needs considered and pt agreeable to plan of care and goals.     Anticipated barriers to physical therapy: scheduling    Goals:   Short Term Goals: 4 weeks   Patient will report compliance with home exercise program for cervical strength/ ROM at least 3x/ week to decrease headache frequency.   Assess Functional Gait Assessment and set goals as needed.   Patient will report decreased headache maximal intensity to 5/10  to demonstrate improved tolerance to daily activities.      Long Term Goals: 8 weeks   Patient will report decreased frequency of headache to 1x/week to demonstrate improved management of symptoms and improved tolerance to daily activities.   Patient will demonstrate improved cervical AROM for rotation to 65 deg to improve driving and ability to return to dancing.    Patient will demonstrate improved cervical flexor strength to 4/5 with at least a 20 second hold for improved posture to decrease headache occurrence.   PLAN     Progress Physical Therapy treatments to assist Pt with managing her L upper quarter Sx.    Taz Leal, PT

## 2023-04-11 ENCOUNTER — PATIENT MESSAGE (OUTPATIENT)
Dept: NEUROLOGY | Facility: CLINIC | Age: 53
End: 2023-04-11
Payer: COMMERCIAL

## 2023-04-11 ENCOUNTER — TELEPHONE (OUTPATIENT)
Dept: NEUROLOGY | Facility: CLINIC | Age: 53
End: 2023-04-11
Payer: COMMERCIAL

## 2023-04-11 NOTE — TELEPHONE ENCOUNTER
----- Message from Gail Rosa sent at 4/11/2023  9:17 AM CDT -----  Regarding: Callback  Contact: @[phone]  Patient is calling Eliu back, please call and advise @[phone] no further details.
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
## 2023-04-13 ENCOUNTER — CLINICAL SUPPORT (OUTPATIENT)
Dept: REHABILITATION | Facility: HOSPITAL | Age: 53
End: 2023-04-13
Payer: COMMERCIAL

## 2023-04-13 ENCOUNTER — OFFICE VISIT (OUTPATIENT)
Dept: NEUROLOGY | Facility: CLINIC | Age: 53
End: 2023-04-13
Payer: COMMERCIAL

## 2023-04-13 VITALS
BODY MASS INDEX: 24.61 KG/M2 | DIASTOLIC BLOOD PRESSURE: 72 MMHG | HEIGHT: 63 IN | WEIGHT: 138.88 LBS | SYSTOLIC BLOOD PRESSURE: 114 MMHG | HEART RATE: 64 BPM

## 2023-04-13 DIAGNOSIS — G44.329 CHRONIC POST-TRAUMATIC HEADACHE, NOT INTRACTABLE: ICD-10-CM

## 2023-04-13 DIAGNOSIS — F43.10 PTSD (POST-TRAUMATIC STRESS DISORDER): ICD-10-CM

## 2023-04-13 DIAGNOSIS — Z74.09 IMPAIRED FUNCTIONAL MOBILITY, BALANCE, AND ENDURANCE: ICD-10-CM

## 2023-04-13 DIAGNOSIS — F09 COGNITIVE AND NEUROBEHAVIORAL DYSFUNCTION FOLLOWING BRAIN INJURY: ICD-10-CM

## 2023-04-13 DIAGNOSIS — H53.9 VISION ABNORMALITIES: ICD-10-CM

## 2023-04-13 DIAGNOSIS — H51.9 CONVERGENCE INSUFFICIENCY OR PALSY IN BINOCULAR EYE MOVEMENT: ICD-10-CM

## 2023-04-13 DIAGNOSIS — S06.9XAS COGNITIVE AND NEUROBEHAVIORAL DYSFUNCTION FOLLOWING BRAIN INJURY: ICD-10-CM

## 2023-04-13 DIAGNOSIS — F41.1 GENERALIZED ANXIETY DISORDER: ICD-10-CM

## 2023-04-13 DIAGNOSIS — R29.898 DECREASED ROM OF NECK: Primary | ICD-10-CM

## 2023-04-13 DIAGNOSIS — Z87.820 HISTORY OF TRAUMATIC BRAIN INJURY: ICD-10-CM

## 2023-04-13 DIAGNOSIS — M50.30 DDD (DEGENERATIVE DISC DISEASE), CERVICAL: ICD-10-CM

## 2023-04-13 DIAGNOSIS — F07.81 POST CONCUSSION SYNDROME: Primary | ICD-10-CM

## 2023-04-13 DIAGNOSIS — T75.09XS ACCIDENT CAUSED BY LIGHTNING, SEQUELA: ICD-10-CM

## 2023-04-13 DIAGNOSIS — M54.12 CERVICAL RADICULOPATHY AT C5: ICD-10-CM

## 2023-04-13 DIAGNOSIS — G43.709 CHRONIC MIGRAINE WITHOUT AURA WITHOUT STATUS MIGRAINOSUS, NOT INTRACTABLE: ICD-10-CM

## 2023-04-13 DIAGNOSIS — G44.309 POST-CONCUSSION HEADACHE: ICD-10-CM

## 2023-04-13 DIAGNOSIS — F33.1 MODERATE EPISODE OF RECURRENT MAJOR DEPRESSIVE DISORDER: ICD-10-CM

## 2023-04-13 DIAGNOSIS — G31.89 COGNITIVE AND NEUROBEHAVIORAL DYSFUNCTION FOLLOWING BRAIN INJURY: ICD-10-CM

## 2023-04-13 PROCEDURE — 3074F PR MOST RECENT SYSTOLIC BLOOD PRESSURE < 130 MM HG: ICD-10-PCS | Mod: CPTII,S$GLB,, | Performed by: PSYCHIATRY & NEUROLOGY

## 2023-04-13 PROCEDURE — 1160F RVW MEDS BY RX/DR IN RCRD: CPT | Mod: CPTII,S$GLB,, | Performed by: PSYCHIATRY & NEUROLOGY

## 2023-04-13 PROCEDURE — 99215 OFFICE O/P EST HI 40 MIN: CPT | Mod: S$GLB,,, | Performed by: PSYCHIATRY & NEUROLOGY

## 2023-04-13 PROCEDURE — 1159F PR MEDICATION LIST DOCUMENTED IN MEDICAL RECORD: ICD-10-PCS | Mod: CPTII,S$GLB,, | Performed by: PSYCHIATRY & NEUROLOGY

## 2023-04-13 PROCEDURE — 1159F MED LIST DOCD IN RCRD: CPT | Mod: CPTII,S$GLB,, | Performed by: PSYCHIATRY & NEUROLOGY

## 2023-04-13 PROCEDURE — 99215 PR OFFICE/OUTPT VISIT, EST, LEVL V, 40-54 MIN: ICD-10-PCS | Mod: S$GLB,,, | Performed by: PSYCHIATRY & NEUROLOGY

## 2023-04-13 PROCEDURE — 3074F SYST BP LT 130 MM HG: CPT | Mod: CPTII,S$GLB,, | Performed by: PSYCHIATRY & NEUROLOGY

## 2023-04-13 PROCEDURE — 99999 PR PBB SHADOW E&M-EST. PATIENT-LVL V: ICD-10-PCS | Mod: PBBFAC,,, | Performed by: PSYCHIATRY & NEUROLOGY

## 2023-04-13 PROCEDURE — 99999 PR PBB SHADOW E&M-EST. PATIENT-LVL V: CPT | Mod: PBBFAC,,, | Performed by: PSYCHIATRY & NEUROLOGY

## 2023-04-13 PROCEDURE — 97530 THERAPEUTIC ACTIVITIES: CPT | Mod: PO

## 2023-04-13 PROCEDURE — 97140 MANUAL THERAPY 1/> REGIONS: CPT | Mod: PO

## 2023-04-13 PROCEDURE — 97112 NEUROMUSCULAR REEDUCATION: CPT | Mod: PO

## 2023-04-13 PROCEDURE — 3008F BODY MASS INDEX DOCD: CPT | Mod: CPTII,S$GLB,, | Performed by: PSYCHIATRY & NEUROLOGY

## 2023-04-13 PROCEDURE — 97110 THERAPEUTIC EXERCISES: CPT | Mod: PO

## 2023-04-13 PROCEDURE — 3078F DIAST BP <80 MM HG: CPT | Mod: CPTII,S$GLB,, | Performed by: PSYCHIATRY & NEUROLOGY

## 2023-04-13 PROCEDURE — 3078F PR MOST RECENT DIASTOLIC BLOOD PRESSURE < 80 MM HG: ICD-10-PCS | Mod: CPTII,S$GLB,, | Performed by: PSYCHIATRY & NEUROLOGY

## 2023-04-13 PROCEDURE — 1160F PR REVIEW ALL MEDS BY PRESCRIBER/CLIN PHARMACIST DOCUMENTED: ICD-10-PCS | Mod: CPTII,S$GLB,, | Performed by: PSYCHIATRY & NEUROLOGY

## 2023-04-13 PROCEDURE — 3008F PR BODY MASS INDEX (BMI) DOCUMENTED: ICD-10-PCS | Mod: CPTII,S$GLB,, | Performed by: PSYCHIATRY & NEUROLOGY

## 2023-04-13 RX ORDER — TESTOSTERONE 50 MG/5G
GEL TRANSDERMAL DAILY
COMMUNITY

## 2023-04-13 RX ORDER — ESTRADIOL 1 MG/G
GEL TOPICAL DAILY
COMMUNITY
End: 2023-11-17 | Stop reason: SDUPTHER

## 2023-04-13 RX ORDER — ESTRADIOL 0.75 MG/.75G
GEL TOPICAL
COMMUNITY
Start: 2023-04-10 | End: 2023-11-17
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
## 2023-04-13 NOTE — PATIENT INSTRUCTIONS
- VOR speed at 100 bpm  - saccades: speed at 90 bpm  - continue completion of convergence exercise as you have been

## 2023-04-13 NOTE — PROGRESS NOTES
[facility]   Physical Therapy Treatment Note     Name: Mary Lundy  Clinic Number: [id number]    Therapy Diagnosis:   No diagnosis found.    Physician: Wei Lin III, MD    Visit Date: 4/13/2023      Physician Orders: PT Eval and Treat vestibular rehab, neck  Medical Diagnosis from Referral: F07.81 (ICD-10-CM) - Post-concussion syndrome  Evaluation Date: 1/19/2023  Authorization Period Expiration: 12/29/2023  Plan of Care Expiration: 3/17/2023 extended to 5/17/2023  Visit # / Visits authorized: 6/20     PN Due: 2/19/23      Precautions: Standard  PTA Visit #: 0/5     Time In: 11:00  Time Out: 11:43  Total Billable Time: 4 minutes    SUBJECTIVE     Pt reports: Pt reports that she has a migraine today that may be related to a bust weekend and busy days at work.  Response to previous treatment: Pt was OK  Functional change: ongoing    Pain: 4/10, headache 4/10  Location: left upper quarter,       OBJECTIVE     Objective Measures updated at progress report unless specified.   Observation: Pt was able to enter the clinic ambulating independently without an assistive device no hat or sun glasses today    Treatment   Bolded = performed  MARY received the treatments listed below:      therapeutic exercises to develop  for 15 minutes including:  Standing rows with red TB 10 x 3  Scapular retractions x 10   Shoulder shrugs x 10  Seated trunk rotation x 10  Active L elbow flexion with wrist flexion to elbow extension with wrist extensino      manual therapy techniques: Joint mobilizations, Manual traction, and Soft tissue Mobilization were applied to the: Cx and thoracic spine and L shoulder for 25 minutes, including:  Passive mobilization to the L GH joint  Manual C spine traction  Soft tissue mobilization to the Cx and thoracic paraspinals  Passive mobilization to the thoracic spine and rib cage.      neuromuscular re-education activities to improve: Balance and Posture for 00 minutes.  The following activities were included:      therapeutic activities to improve functional performance for 00  minutes, including:    Patient Education and Home Exercises     Home Exercises Provided and Patient Education Provided     Education provided:   - pt to continue with HEP    Written Home Exercises Provided: Patient instructed to cont prior HEP. Exercises were reviewed and MARY was able to demonstrate them prior to the end of the session.  MARY demonstrated good  understanding of the education provided. See EMR under Patient Instructions for exercises provided during therapy sessions    ASSESSMENT     Pt presents with report of a migraine headache secondary to increased activity over the past few days.  She responded well to manual interventions. Pt tolerated Tx with a slight decrease in her headache c/o.   Progress exercise and soft tissue mobilization as tolerated.      MARY Is not progressing well towards her goals.   Pt prognosis is Good.     Pt will continue to benefit from skilled outpatient physical therapy to address the deficits listed in the problem list box on initial evaluation, provide pt/family education and to maximize pt's level of independence in the home and community environment.     Pt's spiritual, cultural and educational needs considered and pt agreeable to plan of care and goals.     Anticipated barriers to physical therapy: scheduling    Goals:   Short Term Goals: 4 weeks   Patient will report compliance with home exercise program for cervical strength/ ROM at least 3x/ week to decrease headache frequency.   Assess Functional Gait Assessment and set goals as needed.   Patient will report decreased headache maximal intensity to 5/10 to demonstrate improved tolerance to daily activities.      Long Term Goals: 8 weeks   Patient will report decreased frequency of headache to 1x/week to demonstrate improved management of symptoms and improved tolerance to daily activities.    Patient will demonstrate improved cervical AROM for rotation to 65 deg to improve driving and ability to return to dancing.    Patient will demonstrate improved cervical flexor strength to 4/5 with at least a 20 second hold for improved posture to decrease headache occurrence.   PLAN     Progress Physical Therapy treatments to assist Pt with managing her L upper quarter Sx.    Taz Leal, PT

## 2023-04-20 NOTE — PROGRESS NOTES
Occupational Therapy   Treatment Note     Date: 4/21/2023  Name: Mary Lundy  Clinic Number: [id number]    Therapy Diagnosis:   Encounter Diagnoses   Name Primary?    Post concussion syndrome Yes    Vision abnormalities     Post-concussion headache      Physician: Wei Lin III, MD    Physician Orders: Eval and Treat - oculomotor rehab   Medical Diagnosis: F07.81 (ICD-10-CM) - Post-concussion syndrome  Insurance Authorization Period Expiration: 12/31/2023  Re-assessment completed 3/31/2023  Plan of Care Certification Period: 10 visits  Date of Return to MD: 5/2/2023: Neuro opthalmology     Visit # / Visits authorized: 7 / 20 (+evaluation) Private Insurance   Time In: 9:18  Time Out: 10:08  Total Billable (one on one) Time: 50 minutes    Precautions: Standard    Subjective     Pt reports: busy and stressful work week with a decline in her exercises   She was compliant with home exercise program given last session.   Response to previous treatment: eye exercises are getting easier, headaches are more manageable   Functional change: ongoing    Pain: 5-6/10  Location: migraine with nausea     Headache Averages: minor last week; 1st migraine today in 2 weeks. Overall improvement     Date of Onset: 4 total concussions; most recent in 2018    Objective    Received following speech language pathology  session in waiting room (speech language pathology to d/c patient on this date)    MARY participated in neuromuscular re-education activities to improve: oculomotor function for 12 minutes. The following activities were included:  - VORx1 taped on wall ~6 ft from patient: horizontal and vertical directions x1 min duration 105 bpm (large busy card)  - pencil push ups - binocular and monocular with measurement taken as seen below     MARY participated in dynamic functional therapeutic activities to improve functional performance for 38 minutes, including:  Completed at table top outside of private room with over head  light and mild external noise   - functional saccades with Spot It Cards - all directions   - seated bending tasks for cones placed on bilateral side   - functional mobility to waiting area; required taking elevator on this date with occasional hand held assistance (no support needed from  to car)    Rest breaks as needed    Home Exercises and Education Provided      Education provided:   - HEP  - Progress towards goals    Written Home Exercises Provided: Patient instructed to cont prior HEP.  Exercises were reviewed and MARY was able to demonstrate them prior to the end of the session.    MARY demonstrated good  understanding of the education provided.     See EMR under Patient Instructions for exercises provided prior visit.  3/17/2023: oculomotor progressions (see pt instructions); accommodation activity   3/24/2023: oculomotor progressions (see pt instructions); walking monocular and binocular convergence     Assessment      Mary reported increased difficulty with functional head turns from upper left quadrant to right lower. Binocular convergence remains at 15 cm; monocular (right) convergence to 1 cm.   She remains with ongoing symptoms at this time with wax and wane with stress levels.     MARY is progressing well towards her goals and there are no updates to goals at this time. Pt prognosis is Fair-Guarded; Fair as pt has made improvements over the past month; Guarded due to hx of 4 concussions and underlying visual deficits      Pt will continue to benefit from skilled outpatient occupational therapy to address the deficits listed in the problem list on initial evaluation provide pt/family education and to maximize pt's level of independence in the home and community environment.     Pt's spiritual, cultural and educational needs considered and pt agreeable to plan of care and goals.    Anticipated barriers to occupational therapy: none noted     Goals:  Short Term Goals: 5 weeks   1)  Pt will tolerate oculomotor and/or habitation home exercise/activity program, reporting at least 50% compliance. MET 3/31/2023  2) Pt will pace work tasks reported decreased post concussion headache to < / = 4/10 no more than 5x/wk. MET 3/31/2023  3) Pt will verbalize eye ergonomics to decrease stress on eyes. MET 3/31/2023  4) Near point convergence will decrease to 15 cm or less to improve oculomotor coordination and ability to fixate on close up work. MET 3/31/2023; remains 4/21  5) Pt will demonstrate ability to track single target WFL, in all directions, without onset of headache or dizziness, to improve skills needed for technology use and scanning environment. ongoing  6) Pt to perform saccades WFL, in all directions, without onset of headache or nausea, to improve skills needed for reading. Progressing   7) Pt to leisurely read for 15 minutes without eye fatigue, double vision, or increase in headache. ongoing  8) Pt to performed seated bending tasks and head turns/VOR with little to no onset of dizziness. 8/10 dizziness with VOR 4/21  9) Pt to perform oculomotor exercises facing busy room, using busy card to decrease sensitivity to busy environments. Progressing      Long Term Goals: 10 weeks   1) Pt will be independent with oculomotor and/or habituation home exercise/activity program. ongoing  2) Pt will participate in work tasks reporting decreased post concussion headache to < / = 2/10 no more than 3x/wk using pacing strategies as needed. ongoing  3) Near point convergence will decrease to 10 cm or less to improve oculomotor coordination and ability to fixate on close up work. ongoing  4) Pt will change gaze between near and far targets without onset of headache or dizziness to improve accommodative flexibility, saccadic eye movements, and oculomotor coordination needed for work, participation in ballet, and to maximize safety while driving.progressing with saccades  5) Pt to leisurely read for 30  minutes without eye fatigue, double vision, or increase in headache. ongoing  6) Pt will demonstrate ability to fixate/attend to close up/distance task x 30 minutes without onset of headache, dizziness, or eye fatigue. ongoing  7) Pt will perform home making tasks such as laundry, cleaning, and cooking with little to no complaints of dizziness. ongoing  8) Pt will be able to engage in busy/loud environment (such as the grocery store/mall) for 30 minutes to increase ability to participate in community mobility and social outings. ongoing    Plan     Certification Period/Plan of care expiration: 10 visits      Outpatient Occupational Therapy 1 times weekly for 10 weeks to include the following interventions: Neuromuscular Re-ed, Patient Education, Self Care, Therapeutic Activities, and Therapeutic Exercise.    Updates/Grading for next session: start motion tolerance activities and add VOR cancellation to HEP; administer VOR for motion tolerance; administer further gaze stabilization assessments as neck starts to feel better; gradually progress to open/lighted gym and busy cards     *walking around target / movements sudden       ALFREDO Fink

## 2023-04-21 ENCOUNTER — CLINICAL SUPPORT (OUTPATIENT)
Dept: REHABILITATION | Facility: HOSPITAL | Age: 53
End: 2023-04-21
Payer: COMMERCIAL

## 2023-04-21 DIAGNOSIS — F07.81 POST CONCUSSION SYNDROME: Primary | ICD-10-CM

## 2023-04-21 DIAGNOSIS — R41.841 COGNITIVE COMMUNICATION DEFICIT: ICD-10-CM

## 2023-04-21 DIAGNOSIS — H53.9 VISION ABNORMALITIES: ICD-10-CM

## 2023-04-21 DIAGNOSIS — G44.309 POST-CONCUSSION HEADACHE: ICD-10-CM

## 2023-04-21 PROCEDURE — 97129 THER IVNTJ 1ST 15 MIN: CPT | Mod: PO

## 2023-04-21 PROCEDURE — 97112 NEUROMUSCULAR REEDUCATION: CPT | Mod: PO

## 2023-04-21 PROCEDURE — 97530 THERAPEUTIC ACTIVITIES: CPT | Mod: PO

## 2023-04-21 PROCEDURE — 97130 THER IVNTJ EA ADDL 15 MIN: CPT | Mod: PO

## 2023-04-21 NOTE — PROGRESS NOTES
"[facility]  Speech Therapy Treatment Note- Neurological Rehabilitation  DISCHARGE SUMMARY  Date: 4/21/2023     Name: Yanelis Lundy   MRN: [id number]   Therapy Diagnosis:   Encounter Diagnoses   Name Primary?    Post concussion syndrome Yes    Cognitive communication deficit      Physician: Wei Lin III, MD  Physician Orders: Ambulatory Referral to Speech Therapy   Medical Diagnosis: Post-Concussion Syndrome    Visit #/ Visits Authorized: 4/ 20  Date of Evaluation:  1/19/2023  Insurance Authorization Period: 1/19/2023 - 12/9/2024  Plan of Care Expiration Date:    3/16/2023  Extended Plan of Care:  4/14/23  Progress Note: 2/19/2023     Time In:  830    Time Out:  918    Total Billable Time: 48  minutes      Precautions: Standard  Subjective:   Patient reports: that she is doing better. Shes increased her sleep to 9 hours daily which has resulted in better focus but less "getting done" during the day. She is back on a schedule and finds she is less dependent on her white boards as a strategy. She also continues to use her timers as reminders but finds she has anticipated the activity and completed it before the timer even goes off.    Duration of session completed with light out and door open to hallway due to light sensitivity.   Response to previous treatment: none   Pain Scale:  headache; 3-4/10  Objective:   TIMED  Procedure Min.   Cognitive Therapeutic Interventions, first 15 minutes CPT 82169  15   Cognitive Therapeutic Interventions, each additional 15 minutes CPT 43298  33      Short Term Goals: (4 weeks) Current Progress:   1. Patient will complete selective and alternating attention tasks (auditory or visual) with 90% accuracy independently increase selective attention.     Implementation of strategies to alternate attention has been personalized and is successful. Yanelis consistently uses "what am I doing?/What do I need to ?" As cues for alternating and resuming tasks. " "    Yanelis's work environment requires her to rapidly transfer between several cognitive tasks with high cognitive demand throughout the day.     Goal Met / Discontinue     2. Patient will complete moderate to complex reasoning tasks for 2 minutes with one request for clarification to increase sustained attention. Goal Met Intermittently / Discontinue    3. Patient will use Goal Plan Action Review strategy to complete moderate to complex reasoning, planning, or organization tasks with 90% accuracy independently to improve functional executive function skills.   Structured planning as a strategy for increased cognitive function has been successful for Yanelis. She is requiring less external cues on "good days" and remains successful but dependent on external cues on "Bad days"    Goal Met / Discontinue     4. Patient will complete short term recall tasks after a 5 minutes delay with 90% accuracy  independently  with use of memory strategies to improve recall of information and generalization of memory strategies. Goal Not Met / Discontinue     5. Patient will independently implement cognitive/sensory rest periods throughout day after identifying cognitive fatigue including limiting sensory input (sound, light, etc.)    Yanelis consistently incorporates rest at lunch and immediately after work in her day to improve function over the day.   Goal Met / Discontinue     6. Patient will complete a task to improve attention and memory (I.e. sample bill paying activity, recipe, or multiple choice comprehension questions to 1 paragraphs) with 80% accuracy and natural environment noise distractions (TV news background, music, etc.).  Goal Met Intermittently / Discontinue        Patient Education/Response:   Patient educated regarding the followin. External aids for alternating attention  2. Recording and asking friends "what went better today?" And "What were you successful in today?"    Identified Magdaleno Peterson Katie, " and Justine as friends who could support this insight.  3. Discharge from . [facility] to return to .   4. Patient's personal definition of success vs. Progress seen since starting rehabilitation.     Mary verbalized understanding to all discussed.    Assessment:   MARY is progressing well towards her goals. Session was performed in reduced lighting. Given implementation of strategies has resulted in improved functioning on both good and bad days, Mary demonstrates functional cognitive communication status despite persistent mild cognitive communication deficits.     Patient prognosis is Fair.   Medical necessity is demonstrated by the following IMPAIRMENTS:  Deficits in executive functioning, attention, and memory prevent the patient performing herwork and personal daily activities effectively and efficiently which may put her at risk of unsafe behavior and a decline in quality of life.   Barriers to Therapy: none  Patient's spiritual, cultural and educational needs considered and patient agreeable to plan of care and goals.  Plan:   Discharge from [facility] LAURITA Lewis, CCC-SLP   4/21/2023

## 2023-04-26 NOTE — PLAN OF CARE
"  Outpatient Therapy Discharge Summary   Discharge Date: 4/21/2023   Name: Yanelis Lundy  Clinic Number: [id number]  Therapy Diagnosis:   Encounter Diagnoses   Name Primary?    Post concussion syndrome Yes    Cognitive communication deficit      Physician: Wei Lin III, MD  Physician Orders: Ambulatory Referral to Speech Therapy   Medical Diagnosis: Post-Concussion Syndrome    Visit #/ Visits Authorized: 4/ 20  Date of Evaluation:  1/19/2023  Insurance Authorization Period: 1/19/2023 - 12/9/2024  Plan of Care Expiration Date:    3/16/2023  Extended Plan of Care:  4/14/23  Progress Note: 2/19/2023     Assessment    Assessment of Current Status: Session was performed in reduced lighting. Given implementation of strategies has resulted in improved functioning on both good and bad days, Yanelis demonstrates functional cognitive communication status despite persistent mild cognitive communication deficits.     Goals:   Short Term Goals: (4 weeks) Current Progress:   1. Patient will complete selective and alternating attention tasks (auditory or visual) with 90% accuracy independently increase selective attention.     Implementation of strategies to alternate attention has been personalized and is successful. Yanelis consistently uses "what am I doing?/What do I need to ?" As cues for alternating and resuming tasks.     Yanelis's work environment requires her to rapidly transfer between several cognitive tasks with high cognitive demand throughout the day.     Goal Met / Discontinue     2. Patient will complete moderate to complex reasoning tasks for 2 minutes with one request for clarification to increase sustained attention. Goal Met Intermittently / Discontinue    3. Patient will use Goal Plan Action Review strategy to complete moderate to complex reasoning, planning, or organization tasks with 90% accuracy independently to improve functional executive function skills.   Structured planning as a strategy " "for increased cognitive function has been successful for Yanelis. She is requiring less external cues on "good days" and remains successful but dependent on external cues on "Bad days"    Goal Met / Discontinue     4. Patient will complete short term recall tasks after a 5 minutes delay with 90% accuracy  independently  with use of memory strategies to improve recall of information and generalization of memory strategies. Goal Not Met / Discontinue     5. Patient will independently implement cognitive/sensory rest periods throughout day after identifying cognitive fatigue including limiting sensory input (sound, light, etc.)    Yanelis consistently incorporates rest at lunch and immediately after work in her day to improve function over the day.   Goal Met / Discontinue     6. Patient will complete a task to improve attention and memory (I.e. sample bill paying activity, recipe, or multiple choice comprehension questions to 1 paragraphs) with 80% accuracy and natural environment noise distractions (TV news background, music, etc.).  Goal Met Intermittently / Discontinue        Long Term Goals:  Patient will improve  attention skills to effectively attend to and communicate in complex daily living tasks in functional living environment. Goal Met / Discontinue    2. Patient will demonstrate use of planning,  initiation, and  self-monitoring during daily living activities to improve safety and awareness in functional living environment. Goal Met Intermittently / Discontinue   3. Patient will apply problem-solving strategies with no visual support in daily living functional activities at home and in the community.  Goal Met / Discontinue    4. Patient will use appropriate memory strategies to schedule and recall weekly activities, express needs and recall names to maintain safety and participate socially in functional living environment.   Goal Met / Discontinue      Discharge reason: Patient has reached the maximum " rehab potential for the present time    Plan   This patient is discharged from Speech Therapy    Hope LAURITA Lewis, CCC-SLP   4/21/2023

## 2023-04-28 ENCOUNTER — HOSPITAL ENCOUNTER (OUTPATIENT)
Dept: RADIOLOGY | Facility: HOSPITAL | Age: 53
Discharge: HOME | End: 2023-04-28
Attending: PSYCHIATRY & NEUROLOGY
Payer: COMMERCIAL

## 2023-04-28 DIAGNOSIS — M54.12 CERVICAL RADICULOPATHY AT C5: ICD-10-CM

## 2023-04-28 DIAGNOSIS — M50.30 DDD (DEGENERATIVE DISC DISEASE), CERVICAL: ICD-10-CM

## 2023-04-28 PROCEDURE — 72141 MRI NECK SPINE W/O DYE: CPT | Mod: 26,,, | Performed by: RADIOLOGY

## 2023-04-28 PROCEDURE — 72141 MRI CERVICAL SPINE WITHOUT CONTRAST: ICD-10-PCS | Mod: 26,,, | Performed by: RADIOLOGY

## 2023-04-28 PROCEDURE — 72141 MRI NECK SPINE W/O DYE: CPT | Mod: TC
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
## 2023-05-02 ENCOUNTER — CLINICAL SUPPORT (OUTPATIENT)
Dept: OPHTHALMOLOGY | Facility: CLINIC | Age: 53
End: 2023-05-02
Payer: COMMERCIAL

## 2023-05-02 ENCOUNTER — OFFICE VISIT (OUTPATIENT)
Dept: OPHTHALMOLOGY | Facility: CLINIC | Age: 53
End: 2023-05-02
Payer: COMMERCIAL

## 2023-05-02 DIAGNOSIS — F07.81 POST CONCUSSION SYNDROME: ICD-10-CM

## 2023-05-02 DIAGNOSIS — H51.9 CONVERGENCE INSUFFICIENCY OR PALSY IN BINOCULAR EYE MOVEMENT: ICD-10-CM

## 2023-05-02 DIAGNOSIS — F07.81 POST-CONCUSSION SYNDROME: ICD-10-CM

## 2023-05-02 DIAGNOSIS — H53.15 VISUAL DISTORTIONS OF SHAPE AND SIZE: ICD-10-CM

## 2023-05-02 DIAGNOSIS — F07.81 POST-CONCUSSION SYNDROME: Primary | ICD-10-CM

## 2023-05-02 PROCEDURE — 92083 EXTENDED VISUAL FIELD XM: CPT | Mod: S$GLB,,, | Performed by: STUDENT IN AN ORGANIZED HEALTH CARE EDUCATION/TRAINING PROGRAM

## 2023-05-02 PROCEDURE — 92133 CPTRZD OPH DX IMG PST SGM ON: CPT | Mod: S$GLB,,, | Performed by: STUDENT IN AN ORGANIZED HEALTH CARE EDUCATION/TRAINING PROGRAM

## 2023-05-02 PROCEDURE — 1159F MED LIST DOCD IN RCRD: CPT | Mod: CPTII,S$GLB,, | Performed by: STUDENT IN AN ORGANIZED HEALTH CARE EDUCATION/TRAINING PROGRAM

## 2023-05-02 PROCEDURE — 99205 OFFICE O/P NEW HI 60 MIN: CPT | Mod: S$GLB,,, | Performed by: STUDENT IN AN ORGANIZED HEALTH CARE EDUCATION/TRAINING PROGRAM

## 2023-05-02 PROCEDURE — 99999 PR PBB SHADOW E&M-EST. PATIENT-LVL III: CPT | Mod: PBBFAC,,, | Performed by: STUDENT IN AN ORGANIZED HEALTH CARE EDUCATION/TRAINING PROGRAM

## 2023-05-02 PROCEDURE — 99999 PR PBB SHADOW E&M-EST. PATIENT-LVL III: ICD-10-PCS | Mod: PBBFAC,,, | Performed by: STUDENT IN AN ORGANIZED HEALTH CARE EDUCATION/TRAINING PROGRAM

## 2023-05-02 PROCEDURE — 99205 PR OFFICE/OUTPT VISIT, NEW, LEVL V, 60-74 MIN: ICD-10-PCS | Mod: S$GLB,,, | Performed by: STUDENT IN AN ORGANIZED HEALTH CARE EDUCATION/TRAINING PROGRAM

## 2023-05-02 PROCEDURE — 92133 POSTERIOR SEGMENT OCT OPTIC NERVE(OCULAR COHERENCE TOMOGRAPHY) - OU - BOTH EYES: ICD-10-PCS | Mod: S$GLB,,, | Performed by: STUDENT IN AN ORGANIZED HEALTH CARE EDUCATION/TRAINING PROGRAM

## 2023-05-02 PROCEDURE — 1159F PR MEDICATION LIST DOCUMENTED IN MEDICAL RECORD: ICD-10-PCS | Mod: CPTII,S$GLB,, | Performed by: STUDENT IN AN ORGANIZED HEALTH CARE EDUCATION/TRAINING PROGRAM

## 2023-05-02 PROCEDURE — 92083 HUMPHREY VISUAL FIELD - OU - BOTH EYES: ICD-10-PCS | Mod: S$GLB,,, | Performed by: STUDENT IN AN ORGANIZED HEALTH CARE EDUCATION/TRAINING PROGRAM

## 2023-05-02 NOTE — PROGRESS NOTES
HVF done ou. Rel/fix/coop. Good ou./chart checked for latex allergy./ pirate patch was used for testing./4.25 + .25 x 52/od 1.50/os-smh

## 2023-05-02 NOTE — LETTER
"     [facility] - [address] [place] [address]  [address]  [facility] [address]  Phone: [phone]  Fax: [phone]   May 2, 2023    Wei Lin III, MD  [address]  [address]  [facility] [address]    Patient: Yanelis Lundy   MR Number: [id number]   YOB: 1970   Date of Visit: 5/2/2023       Dear Dr. Lin :    Thank you for referring Yanelis Lundy to me for evaluation. Here is my assessment and plan of care:    Impression:  Yanelis Lundy has history of anxiety, depression, PTSD, post concussive syndrome, who presents for evaluation of eye movements. They report diplopia without prism glasses and episodes of feeling of "visual obstruction" often corresponding to migraines. Neuro-ophthalmologic examination was notable for good visual acuities, full color vision, normal ocular motility and small degree exotropia at near and distance. Most comfortable with 3-4 PD BI at near and 2-4 PD BI at distance OCT with normal and symmetric RNFL thicknesses. Formal visual fields were without any specific pattern of visual field loss. Her feeling of visual obstruction is likely migrainous phenomenon. Would include these events in her headache calendar/diary.      Plan:  1. Follow up with Dr. Lin as planned  2. Channing Villafana in optometry for prism glasses as planned, will need to discuss whether she needs separate glasses for distance and near or if one amount of base in prism for each will suffice, allowing for progressive lenses    Follow-up:  I will see her in follow-up on an as needed basis.    If you have questions, please do not hesitate to call me. I look forward to following Ms. Yanelis Lundy along with you.    Sincerely,        Jennifer Lu MD       CC  Channing Villafana, OD         "

## 2023-05-03 NOTE — PROGRESS NOTES
Occupational Therapy   Treatment Note     Date: 5/5/2023  Name: Mary Lundy  Clinic Number: [id number]    Therapy Diagnosis:   Encounter Diagnoses   Name Primary?    Post concussion syndrome Yes    Vision abnormalities     Post-concussion headache      Physician: Wei Lin III, MD    Physician Orders: Eval and Treat - oculomotor rehab   Medical Diagnosis: F07.81 (ICD-10-CM) - Post-concussion syndrome  Insurance Authorization Period Expiration: 12/31/2023  Re-assessment completed 3/31/2023  Plan of Care Certification Period: 10 visits  Date of Return to MD: 5/2/2023: Neuro opthalmology     Visit # / Visits authorized: 8 / 20 (+evaluation) Private Insurance   Time In: 8:30  Time Out: 9:21  Total Billable (one on one) Time: 51 minutes    Precautions: Standard    Subjective   Pt reports: not feeling as good today- saw lightening this morning   She was compliant with home exercise program given last session.   Response to previous treatment: eye exercises are getting easier, headaches are more manageable   Functional change: ongoing    Pain: 7/10  Location: migraine with nausea of 8-9/10    Headache Averages: minor last week; 1st migraine today in 2 weeks. Overall improvement     Date of Onset: 4 total concussions; most recent in 2018    Objective      MARY participated in dynamic functional therapeutic activities to improve functional performance for 51  minutes, including:  -seated for upper body ergonometer on level 1.5; completed for 12 minutes switching directions mid way. While on UBE- completed saccades with Spot [employer] Cards   Required adaptation to room to avoid seeing stormy weather     Seated in noisy gym area - back to window  -head turns lower left to upper right & alternate- saccades for color find for dabloons     Rest breaks as needed     Home Exercises and Education Provided      Education provided:   - HEP  - Progress towards goals    Written Home Exercises Provided: Patient instructed to cont  prior HEP.  Exercises were reviewed and MARY was able to demonstrate them prior to the end of the session.    MARY demonstrated good  understanding of the education provided.     See EMR under Patient Instructions for exercises provided prior visit.  3/17/2023: oculomotor progressions (see pt instructions); accommodation activity   3/24/2023: oculomotor progressions (see pt instructions); walking monocular and binocular convergence     Assessment      Mary tolerated session fairly today- able to complete session in busy environment despite the external auditory and visual input, she was able to complete all functional visual task. Nausea and HA pain consistent throughout session.     MARY is progressing well towards her goals and there are no updates to goals at this time. Pt prognosis is Fair    Pt will continue to benefit from skilled outpatient occupational therapy to address the deficits listed in the problem list on initial evaluation provide pt/family education and to maximize pt's level of independence in the home and community environment.     Pt's spiritual, cultural and educational needs considered and pt agreeable to plan of care and goals.    Anticipated barriers to occupational therapy: none noted     Goals:  Short Term Goals: 5 weeks   1) Pt will tolerate oculomotor and/or habitation home exercise/activity program, reporting at least 50% compliance. MET 3/31/2023  2) Pt will pace work tasks reported decreased post concussion headache to < / = 4/10 no more than 5x/wk. MET 3/31/2023  3) Pt will verbalize eye ergonomics to decrease stress on eyes. MET 3/31/2023  4) Near point convergence will decrease to 15 cm or less to improve oculomotor coordination and ability to fixate on close up work. MET 3/31/2023; remains 4/21  5) Pt will demonstrate ability to track single target WFL, in all directions, without onset of headache or dizziness, to improve skills needed for technology use and scanning  environment. ongoing  6) Pt to perform saccades WFL, in all directions, without onset of headache or nausea, to improve skills needed for reading. Progressing   7) Pt to leisurely read for 15 minutes without eye fatigue, double vision, or increase in headache. ongoing  8) Pt to performed seated bending tasks and head turns/VOR with little to no onset of dizziness. 8/10 dizziness with VOR 4/21  9) Pt to perform oculomotor exercises facing busy room, using busy card to decrease sensitivity to busy environments. Progressing      Long Term Goals: 10 weeks   1) Pt will be independent with oculomotor and/or habituation home exercise/activity program. ongoing  2) Pt will participate in work tasks reporting decreased post concussion headache to < / = 2/10 no more than 3x/wk using pacing strategies as needed. ongoing  3) Near point convergence will decrease to 10 cm or less to improve oculomotor coordination and ability to fixate on close up work. ongoing  4) Pt will change gaze between near and far targets without onset of headache or dizziness to improve accommodative flexibility, saccadic eye movements, and oculomotor coordination needed for work, participation in ballet, and to maximize safety while driving.progressing with saccades  5) Pt to leisurely read for 30 minutes without eye fatigue, double vision, or increase in headache. ongoing  6) Pt will demonstrate ability to fixate/attend to close up/distance task x 30 minutes without onset of headache, dizziness, or eye fatigue. ongoing  7) Pt will perform home making tasks such as laundry, cleaning, and cooking with little to no complaints of dizziness. ongoing  8) Pt will be able to engage in busy/loud environment (such as the grocery store/mall) for 30 minutes to increase ability to participate in community mobility and social outings. ongoing    Plan     Certification Period/Plan of care expiration: 10 visits      Outpatient Occupational Therapy 1 times weekly for 10  weeks to include the following interventions: Neuromuscular Re-ed, Patient Education, Self Care, Therapeutic Activities, and Therapeutic Exercise.    Updates/Grading for next session: re-assessment + update plan of care on visit 10       start motion tolerance activities and add VOR cancellation to HEP; administer VOR for motion tolerance; administer further gaze stabilization assessments as neck starts to feel better; gradually progress to open/lighted gym and busy cards     *walking around target / movements sudden       ALFREDO Fink

## 2023-05-05 ENCOUNTER — CLINICAL SUPPORT (OUTPATIENT)
Dept: REHABILITATION | Facility: HOSPITAL | Age: 53
End: 2023-05-05
Attending: PSYCHIATRY & NEUROLOGY
Payer: COMMERCIAL

## 2023-05-05 DIAGNOSIS — F07.81 POST CONCUSSION SYNDROME: Primary | ICD-10-CM

## 2023-05-05 DIAGNOSIS — H53.9 VISION ABNORMALITIES: ICD-10-CM

## 2023-05-05 DIAGNOSIS — G44.309 POST-CONCUSSION HEADACHE: ICD-10-CM

## 2023-05-05 PROCEDURE — 97530 THERAPEUTIC ACTIVITIES: CPT | Mod: PO

## 2023-05-31 DIAGNOSIS — F33.1 MODERATE EPISODE OF RECURRENT MAJOR DEPRESSIVE DISORDER: ICD-10-CM

## 2023-05-31 DIAGNOSIS — F41.1 GENERALIZED ANXIETY DISORDER: ICD-10-CM

## 2023-06-01 ENCOUNTER — PATIENT MESSAGE (OUTPATIENT)
Dept: PSYCHIATRY | Facility: CLINIC | Age: 53
End: 2023-06-01
Payer: COMMERCIAL

## 2023-06-01 RX ORDER — BUPROPION HYDROCHLORIDE 150 MG/1
150 TABLET ORAL DAILY
Qty: 30 TABLET | Refills: 0 | Status: SHIPPED | OUTPATIENT
Start: 2023-06-01 | End: 2023-06-30 | Stop reason: SDUPTHER

## 2023-06-01 RX ORDER — DULOXETIN HYDROCHLORIDE 60 MG/1
60 CAPSULE, DELAYED RELEASE ORAL DAILY
Qty: 30 CAPSULE | Refills: 0 | Status: SHIPPED | OUTPATIENT
Start: 2023-06-01 | End: 2023-06-30 | Stop reason: SDUPTHER

## 2023-06-01 RX ORDER — ALPRAZOLAM 0.5 MG/1
0.5 TABLET ORAL 2 TIMES DAILY
Qty: 60 TABLET | Refills: 0 | Status: SHIPPED | OUTPATIENT
Start: 2023-06-01 | End: 2023-06-30 | Stop reason: SDUPTHER

## 2023-06-01 NOTE — PROGRESS NOTES
"Occupational Therapy   Treatment Note     Date: 6/2/2023  Name: Mary Lundy  Clinic Number: [id number]    Therapy Diagnosis:   Encounter Diagnoses   Name Primary?    Post concussion syndrome Yes    Vision abnormalities     Post-concussion headache      Physician: Wei Lin III, MD    Physician Orders: Eval and Treat - oculomotor rehab   Medical Diagnosis: F07.81 (ICD-10-CM) - Post-concussion syndrome  Insurance Authorization Period Expiration: 12/31/2023  Re-assessment completed 3/31/2023  Plan of Care Certification Period: 10 visits  Date of Return to MD: 5/2/2023: Neuro opthalmology     Visit # / Visits authorized: 9 / 20 (+evaluation) Private Insurance   Time In: 0752  Time Out: 0832  Total Billable (one on one) Time: 40 minutes    Precautions: Standard    Subjective     Pt reports: Pt reported increased exhaustion with work load over the past 1-2 weeks. Pt went 36-48 hrs without sleeping. She was able to get all of her work done, but she had a daily headache with associated nausea. Nausea was more intense than the headache. During VOR exercise with reading component today, pt reported "it's like the words are in another language; I am having trouble articulating them. I have to be very mindful in order to get the words out."   Response to previous treatment: eye exercises are getting easier, headaches are more manageable   Functional change: ongoing    Pain: 4-5/10  Location: migraine with nausea of 7/10    Headache Averages: In the past week due to increased work load: Frequency: Daily; Duration: Bhakti; Intensity: 5/10 with accompanied nausea     Date of Onset: 4 total concussions; most recent in 2018    Objective      MARY participated in dynamic functional therapeutic activities to improve functional performance for 40 minutes, including:  On [employer], level 2.0, pt completed the following oculomotor/gaze stabilization exercises with ear plugs in:   - Smooth pursuits, horizontal/vertical/diagonal " directions, 1 x 30 seconds each using busy card   - Saccades with identification component, horizontal/vertical/diagonal directions, 1 x 30 seconds each using quadrant card  - VORx1 with reading components, horizontal/vertical directions, 1 x 30 seconds each at self selected pace using black/white word card     - HEP/HAP review; administered updated copy of home exercises     Home Exercises and Education Provided      Education provided:   - HEP  - Progress towards goals    Written Home Exercises Provided: Patient instructed to cont prior HEP.  Exercises were reviewed and MARY was able to demonstrate them prior to the end of the session.    MARY demonstrated good  understanding of the education provided.     See EMR under Patient Instructions for exercises provided prior visit. (Pursuits, saccades, pencil push ups)   3/17/2023: oculomotor progressions (see pt instructions); accommodation activity   3/24/2023: oculomotor progressions (see pt instructions); walking monocular and binocular convergence   4/4/2023: VORx1 hor/vert with near and far targets at 100 bpm x45 s  4/13/2023: oculomotor progressions (see pt instructions)   6/2/2023: oculomotor progressions; HEP review; added habituation activities     Assessment      Pt tolerated today's session fair-poor. Pt arrived with increased exhaustion post busy work week and not sleeping for extended periods of time. Interventions focused on combining oculomotor exercises with cardio and reading/identification components. Pt was somewhat hesitant to taking breaks; reminded patient of importance of monitoring symptoms and taking breaks as needed. Pt tolerated pursuits and saccades fairly with brief rest breaks incorporated between sets. However, VORx1 was overly challenging and exacerbated symptoms significantly. Extended time with headphones, sunglasses, and seated in dark environment was required for recovery. Approximately 30 minutes total recovery time. Provided  pt with water and supervision from therapist/support staff until symptoms decreased. Pt left session reporting 6-7/10 headache and 7/10 nausea, which was close to where pt started session. Pt was able to safely ambulate and exit session independently; elevator taken to 1st floor. Prior to leaving, again educated pt thoroughly on importance of taking breaks, monitoring symptoms, listening to body, and not to continue exercise/activity if symptoms are too elevated; pt reported understanding. Pt would continue to benefit from skilled occupational therapy services to maximize oculomotor functioning and gaze stabilization, habituation for desensitization to environments/movements that elicit/increase symptoms, pt education on mindfulness/relaxation strategies, and HEP/HAP guidance to improve functional participation with meaningful occupations.    MARY is progressing well towards her goals and there are no updates to goals at this time. Pt prognosis is Fair-Guarded; Fair as pt has made improvements; Guarded due to hx of 4 concussions and underlying visual deficits.    Pt will continue to benefit from skilled outpatient occupational therapy to address the deficits listed in the problem list on initial evaluation provide pt/family education and to maximize pt's level of independence in the home and community environment.     Pt's spiritual, cultural and educational needs considered and pt agreeable to plan of care and goals.    Anticipated barriers to occupational therapy: none noted     Goals:  Short Term Goals: 5 weeks   1) Pt will tolerate oculomotor and/or habitation home exercise/activity program, reporting at least 50% compliance. MET 3/31/2023  2) Pt will pace work tasks reported decreased post concussion headache to < / = 4/10 no more than 5x/wk. MET 3/31/2023  3) Pt will verbalize eye ergonomics to decrease stress on eyes. MET 3/31/2023  4) Near point convergence will decrease to 15 cm or less to improve  oculomotor coordination and ability to fixate on close up work. MET 3/31/2023; remains 4/21  5) Pt will demonstrate ability to track single target WFL, in all directions, without onset of headache or dizziness, to improve skills needed for technology use and scanning environment. ongoing  6) Pt to perform saccades WFL, in all directions, without onset of headache or nausea, to improve skills needed for reading. Progressing   7) Pt to leisurely read for 15 minutes without eye fatigue, double vision, or increase in headache. ongoing  8) Pt to performed seated bending tasks and head turns/VOR with little to no onset of dizziness. ongoing; 8/10 dizziness with VOR 4/21  9) Pt to perform oculomotor exercises facing busy room, using busy card to decrease sensitivity to busy environments. Progressing      Long Term Goals: 10 weeks   1) Pt will be independent with oculomotor and/or habituation home exercise/activity program. ongoing  2) Pt will participate in work tasks reporting decreased post concussion headache to < / = 2/10 no more than 3x/wk using pacing strategies as needed. ongoing  3) Near point convergence will decrease to 10 cm or less to improve oculomotor coordination and ability to fixate on close up work. ongoing  4) Pt will change gaze between near and far targets without onset of headache or dizziness to improve accommodative flexibility, saccadic eye movements, and oculomotor coordination needed for work, participation in ballet, and to maximize safety while driving. progressing with saccades  5) Pt to leisurely read for 30 minutes without eye fatigue, double vision, or increase in headache. ongoing  6) Pt will demonstrate ability to fixate/attend to close up/distance task x 30 minutes without onset of headache, dizziness, or eye fatigue. ongoing  7) Pt will perform home making tasks such as laundry, cleaning, and cooking with little to no complaints of dizziness. ongoing  8) Pt will be able to engage in  busy/loud environment (such as the grocery store/mall) for 30 minutes to increase ability to participate in community mobility and social outings. ongoing    Plan     Certification Period/Plan of care expiration: 10 visits      Outpatient Occupational Therapy 1 times weekly for 10 weeks to include the following interventions: Neuromuscular Re-ed, Patient Education, Self Care, Therapeutic Activities, and Therapeutic Exercise.    Updates/Grading for next session: re-assessment + update plan of care on visit 10 (currently scheduled for 6/9); motion tolerance activities (functional mobility & fast/sudden movements); add VOR cancellation to HEP for motion tolerance; continue to progress oculomotor exercises (busy environments/targets) including pursuits, saccades, accommodation, convergence, and VOR; cont to combine functional activities with eye exercises      Joyce Rodriguez, OT

## 2023-06-02 ENCOUNTER — CLINICAL SUPPORT (OUTPATIENT)
Dept: REHABILITATION | Facility: HOSPITAL | Age: 53
End: 2023-06-02
Payer: COMMERCIAL

## 2023-06-02 ENCOUNTER — PATIENT MESSAGE (OUTPATIENT)
Dept: REHABILITATION | Facility: HOSPITAL | Age: 53
End: 2023-06-02

## 2023-06-02 DIAGNOSIS — H53.9 VISION ABNORMALITIES: ICD-10-CM

## 2023-06-02 DIAGNOSIS — F07.81 POST CONCUSSION SYNDROME: Primary | ICD-10-CM

## 2023-06-02 DIAGNOSIS — G44.309 POST-CONCUSSION HEADACHE: ICD-10-CM

## 2023-06-02 PROCEDURE — 97530 THERAPEUTIC ACTIVITIES: CPT | Mod: PO

## 2023-06-02 NOTE — PATIENT INSTRUCTIONS
Perform in horizontal and vertical directions. 1 x 45 seconds at 100 bpm. Keep card still and turn head maintaining gaze on target. Try to use busy card.       Perform in horizontal, vertical, and diagonal directions. 1 x 1 minute each. Practice using both near and far targets. Move target slowly, keep head still, and track with eyes. Use busy card with near target when possible.     Perform in horizontal, vertical, and diagonal directions. 1 x 1 minute each at 90 bpm but go faster if this feels easy (no more than 120!). Move eyes between targets as fast as you can that they remain in focus, keep head still. Use busy card.       2 x 10. Hold near point for 5 seconds.     - Add accommodation task: Choose a near and far target at midline. Change gaze between the two targets ensuring that each come into focus. Perform for 1 minute.     Retrieved from:          HABITUATION TRAINING EXERCISES FOR DIZZINESS    Purpose:     These exercises are used to help reduce the dizziness you experience from moving around and/or from visually stimulating environments. The idea of these exercises is to repeatedly expose you to specific movements and/or visual stimuli that bring on the dizziness you experience.  With consistent exposure over time, your brain should not respond to these movements or visual stimuli, which can lead to a reduction of the dizziness. The exercises are designed to only cause a mild (or at most, a moderate) increase in dizziness and it should only last for a maximum of a few minutes after you stop doing them.     Monitoring symptoms:    Before starting the exercises, it is important for you to rate the dizziness you are experiencing by using the scale provided.  Ask yourself, What is the intensity of the dizziness right now on a scale of 0 to 10? A rating of zero means you have no dizziness at all and a rating of 10 means you have the worst symptoms imaginable.       0 - - - - - 1 - - - - - 2 - - - - - 3 - -  - - - 4 - - - - - 5 - - - - - 6 - - - - - 7- - - - - 8 - - - - - 9 - - - - - 10    This is your baseline level. Monitor the dizziness as you do the exercises.     After each exercise, you need to stop and rest. The symptoms should decrease to your baseline level before you begin the next exercise.    If you are experiencing severe dizziness symptoms before you start the exercises, do not start them and let your physical therapist know.    Keep track of when you do the exercises and what symptoms you experience by using the log that is provided at the end.    When to adjust or stop the exercises:    Adjust the exercise (move slower, do fewer repetitions, or less time), if the dizziness you experience:  feels greater than a mild or moderate intensity.   lasts longer than a maximum of 15 - 20 minutes after you stop.    Stop an/the exercise(s), if you:  experience pain with it, including headache.   don't feel safe performing it.  you have adjusted the exercises and continue to have greater than mild to moderate increase in dizziness intensity or symptoms last longer than 15 - 20 minutes.    Let your therapist know if you need to adjust or stop an exercise so she/he can make sure that the exercise is right for you.        HABITUATION TRAINING EXERCISES FOR DIZZINESS    Movement Habituation Exercises:  You and your physical therapist will work together to identify which movements cause you to have mild to moderate dizziness symptoms.     Do the exercises that have been checked off, as follows:   Days per week: 5  Times per day: 2-3  # repetitions for each exercise: Try at least 10   # sets per exercise: 1-2  To adjust the intensity, change the: 1. Speed, 2. Amount, or 3. Movement size.    1. Bending    2. Turning/Rolling    3. Reaching/Looking Up

## 2023-06-08 NOTE — PROGRESS NOTES
"Occupational Therapy   Re-Assessment + Plan of Care     Date: 6/9/2023  Name: Mary Lundy  Clinic Number: [id number]    Therapy Diagnosis:   Encounter Diagnoses   Name Primary?    Post concussion syndrome Yes    Vision abnormalities     Post-concussion headache        Physician: Wei Lin III, MD    Physician Orders: Eval and Treat - oculomotor rehab   Medical Diagnosis: F07.81 (ICD-10-CM) - Post-concussion syndrome  Insurance Authorization Period Expiration: 12/31/2023  Re-assessment completed 3/31/2023; partial re assessment 6/9/2023  Plan of Care Certification Period: 20 visits  Date of Return to MD: 7/26/2023: Neurology     Visit # / Visits authorized: 10 / 20 (+evaluation) Private Insurance   Time In: 7:48  Time Out: 9:32  Total Billable (one on one) Time: 44 minutes    Precautions: Standard    Subjective     Pt reports: migraines for the past month following trial    Response to previous treatment: eye exercises are getting easier, headaches are more manageable   Functional change: ongoing    Pain: 3-4/10  Location: migraine pain with mild nausea     Headache Averages: In the past week due to increased work load: Frequency: Daily; Duration: Bhakti; Intensity: 5/10 with accompanied nausea     Date of Onset: 4 total concussions; most recent in 2018    Objective      MARY participated in neuromuscular re-education activities to improve: oculomotor function for 34 minutes. The following activities were included:  Reassessment/Assessment:   Oculomotor Exam  Vestibular/Ocular-Motor Screening (VOMS) for Concussion  Vestibular/Ocular Motor Test: Not Tested Headache   0-10 Dizziness  0-10 Nausea   0-10 Fogginess   0-10 eComments   Baseline   3-4 0 2-3 0  "typical Friday for me"   Smooth Pursuit  (1.5 feet left/right of center; 3 feet away; 2 times; 30 bpm each direction; horizontal and vertical)   3-4 0 2-3 0 Aware of HA but no change in intensity    Saccades - Horizontal  (1.5 feet left/right of center; 3 " feet away; 10 times; performed as quickly as possible)   3-4 0 2-3 0 Forehead pressure   Saccades - Vertical   (1.5 feet up/down of center; 3 feet away; 10 times; performed as quickly as possible)   3-4 0 2-3 0 Bottom one became double with task   Convergence (Near Point)  (14 pt font; stop when pt reports diplopia or outward deviation of eye is observed, blurring is ignored; measure distance between target and tip of nose; abnormal finding is >/= 6 cm)   3-4 1-2 (with eyes closed following) 2-3 0 (Near Point in cm):  Measure 1: 20 (w glasses)  Measure 2: 29 (w/ glasses)  Measure 3: 20 (w/ glasses)    VOR - Horizontal  (14 pt font; 20 degrees rotation left/right; 10 reps; 180 bpm)   3-4 6 7 0 No visual slippage; performed at appropriate speed   VOR - Vertical  (14 pt font; 20 degrees rotation up/down; 10 times; 180 bpm)   3-4 6 2-3 Unable to rate on numerical scale     Visual Motion Sensitivity Test  (80 degrees left/right; 5 times each direction; 50 bpm)   6-7 5 5 1 Turning to right felt worse       Oculomotor ROM: WNL, but pt reported double vision in RUQ   Eye Alignment: WNL  Visual Field: NT  Acuity: prism and filter lenses; has been seen by neuro opthalmology     Spontaneous Nystagmus: None  Gaze Holding Nystagmus: None  Gaze Holding (No Fixation): NT  Smooth Pursuits:   Horizontal: Smooth eye movements; no diplopia reported               Vertical: Smooth eye movements; no diplopia reported   Saccades:               Horizontal: Smooth eye movements; performed at consistent but slower pace; increased headache and nausea reported               Vertical: Smooth eye movements; performed at consistent but slower pace; increased headache and nausea reported; pt also reported high target became double towards the end of repetitions   Near Point Convergence (cm):   Eval: Impaired; see above, has returned to increased deficit       VOR Slow Head Movement:   Eval: Intact   3/31/2023: NT  Head Thrust:   Eval: Grossly  intact bilaterally; occasional visual slippage with head left but infrequent   3/31/2023: NT  Dynamic Visual Acuity (DVA): NT        Motion Sensitivity Testing  Starting of symptoms: 5/10 with headache      Movement Intensity (0-5) Duration  < 5 = 0  5 - 10s = 1  11 - 30s = 2  >30s = 3 Score  Intensity + duration score   1. Sitting to Supine 0/5 (frontal) 0 0   2. Supine to left side 0/5 (floaters) 0 0   3. Supine to right side 1/5 0 0   4. Supine to sitting 1/5 (dizzy) 3 2   5. L [place] Hallpike      6. Up from left      7. R [place] Hallpike      8. Up from right      9. Sitting, head tipped to L knee      10. Head up from left knee      11. Sitting, head tipped to R knee      12. Head up from right knee      13. Sitting head turns (5)      14. Sitting head pitches (5)      15. In stance, 180 degree turn to L      16. In stance, 180 degree turn to R         Unable to complete the rest due to time constraints and needed time throughout for rest breaks     MARY participated in dynamic functional therapeutic activities to improve functional performance for 10  minutes, including:  -education on rest breaks and importance of them during stressful times; scheduling in rest breaks and ocular breaks into daily routine  -sleep hygiene   -discussed progress and some regressions; discussed plan of care       Home Exercises and Education Provided      Education provided:   - importance of rest breaks   -sleep hygiene   - HEP  - Progress towards goals    Written Home Exercises Provided: Patient instructed to cont prior HEP.  Exercises were reviewed and MARY was able to demonstrate them prior to the end of the session.    MARY demonstrated good  understanding of the education provided.     See EMR under Patient Instructions for exercises provided prior visit. (Pursuits, saccades, pencil push ups)   3/17/2023: oculomotor progressions (see pt instructions); accommodation activity   3/24/2023: oculomotor progressions (see pt  instructions); walking monocular and binocular convergence   4/4/2023: VORx1 hor/vert with near and far targets at 100 bpm x45 s  4/13/2023: oculomotor progressions (see pt instructions)   6/2/2023: oculomotor progressions; HEP review; added habituation activities     Assessment      Mary tolerated fairly (-) this session. She self reported a month of increased migraines due to poor sleep with work related tasks. Reached out to MD on this date for re order of speech language pathology due to ongoing deficits with executive functioning and time managment  impacting her daily life and work performance. Upon reassessment with VOMS, pt with an increase in her convergence (binocular) measurement. She does demonstrate an overall improvement in her dizziness and nausea with saccades; however, it remains with VOR and turning activities. She continues to require added time for all tasks completion. She does verbalize that she feels like overall OT has helped her manage and curb her symptoms. Pt would continue to benefit from skilled occupational therapy services to maximize oculomotor functioning and gaze stabilization, habituation for desensitization to environments/movements that elicit/increase symptoms, pt education on mindfulness/relaxation strategies, and HEP/HAP guidance to improve functional participation with meaningful occupations.    MARY is progressing well towards her goals and there are no updates to goals at this time. Pt prognosis is Fair-Guarded; Fair as pt has made improvements; Guarded due to hx of 4 concussions and underlying visual deficits.    Pt will continue to benefit from skilled outpatient occupational therapy to address the deficits listed in the problem list on initial evaluation provide pt/family education and to maximize pt's level of independence in the home and community environment.     Pt's spiritual, cultural and educational needs considered and pt agreeable to plan of care and  goals.    Anticipated barriers to occupational therapy: none noted     Goals:  Short Term Goals: 5 weeks   1) Pt will tolerate oculomotor and/or habitation home exercise/activity program, reporting at least 50% compliance. MET 3/31/2023  2) Pt will pace work tasks reported decreased post concussion headache to < / = 4/10 no more than 5x/wk. MET 3/31/2023  3) Pt will verbalize eye ergonomics to decrease stress on eyes. MET 3/31/2023  4) Near point convergence will decrease to 15 cm or less to improve oculomotor coordination and ability to fixate on close up work. MET 3/31/2023; remains 4/21  5) Pt will demonstrate ability to track single target WFL, in all directions, without onset of headache or dizziness, to improve skills needed for technology use and scanning environment. ongoing  6) Pt to perform saccades WFL, in all directions, without onset of headache or nausea, to improve skills needed for reading. Progressing   7) Pt to leisurely read for 15 minutes without eye fatigue, double vision, or increase in headache. ongoing  8) Pt to performed seated bending tasks and head turns/VOR with little to no onset of dizziness. ongoing; 8/10 dizziness with VOR 4/21  9) Pt to perform oculomotor exercises facing busy room, using busy card to decrease sensitivity to busy environments. Progressing      Long Term Goals: 10 weeks   1) Pt will be independent with oculomotor and/or habituation home exercise/activity program. ongoing  2) Pt will participate in work tasks reporting decreased post concussion headache to < / = 2/10 no more than 3x/wk using pacing strategies as needed. Progressing   3) Near point convergence will decrease to 10 cm or less to improve oculomotor coordination and ability to fixate on close up work. ongoing  4) Pt will change gaze between near and far targets without onset of headache or dizziness to improve accommodative flexibility, saccadic eye movements, and oculomotor coordination needed for work,  participation in [employer], and to maximize safety while driving. progressing with saccades  5) Pt to leisurely read for 30 minutes without eye fatigue, double vision, or increase in headache. ongoing  6) Pt will demonstrate ability to fixate/attend to close up/distance task x 30 minutes without onset of headache, dizziness, or eye fatigue. Ongoing/progressing; inconsistent   7) Pt will perform home making tasks such as laundry, cleaning, and cooking with little to no complaints of dizziness. ongoing  8) Pt will be able to engage in busy/loud environment (such as the grocery store/mall) for 30 minutes to increase ability to participate in community mobility and social outings. Ongoing    Patient has met 3 short term goals and is progressing towards the additional  Recent set back with high stress environment with work  Long term goals remain ongoing; no additional goals added at this time     Plan     Certification Period/Plan of care expiration: 20 visits total      Continue Outpatient Occupational Therapy 1 times weekly for 7 weeks to include the following interventions: Neuromuscular Re-ed, Patient Education, Self Care, Therapeutic Activities, and Therapeutic Exercise.    Updates/Grading for next session: complete the MSQ    motion tolerance activities (functional mobility & fast/sudden movements); add VOR cancellation to HEP for motion tolerance; continue to progress oculomotor exercises (busy environments/targets) including pursuits, saccades, accommodation, convergence, and VOR; cont to combine functional activities with eye exercises      ALFREDO Fink

## 2023-06-09 ENCOUNTER — CLINICAL SUPPORT (OUTPATIENT)
Dept: REHABILITATION | Facility: HOSPITAL | Age: 53
End: 2023-06-09
Payer: COMMERCIAL

## 2023-06-09 ENCOUNTER — PATIENT MESSAGE (OUTPATIENT)
Dept: REHABILITATION | Facility: HOSPITAL | Age: 53
End: 2023-06-09

## 2023-06-09 DIAGNOSIS — G44.309 POST-CONCUSSION HEADACHE: ICD-10-CM

## 2023-06-09 DIAGNOSIS — H53.9 VISION ABNORMALITIES: ICD-10-CM

## 2023-06-09 DIAGNOSIS — F07.81 POST CONCUSSION SYNDROME: Primary | ICD-10-CM

## 2023-06-09 PROCEDURE — 97530 THERAPEUTIC ACTIVITIES: CPT | Mod: PO

## 2023-06-09 PROCEDURE — 97112 NEUROMUSCULAR REEDUCATION: CPT | Mod: PO

## 2023-06-09 NOTE — PLAN OF CARE
"Occupational Therapy   Re-Assessment + Plan of Care     Date: 6/9/2023  Name: Mary Lundy  Clinic Number: [id number]    Therapy Diagnosis:   Encounter Diagnoses   Name Primary?    Post concussion syndrome Yes    Vision abnormalities     Post-concussion headache        Physician: Wei Lin III, MD    Physician Orders: Eval and Treat - oculomotor rehab   Medical Diagnosis: F07.81 (ICD-10-CM) - Post-concussion syndrome  Insurance Authorization Period Expiration: 12/31/2023  Re-assessment completed 3/31/2023; partial re assessment 6/9/2023  Plan of Care Certification Period: 20 visits  Date of Return to MD: 7/26/2023: Neurology     Visit # / Visits authorized: 10 / 20 (+evaluation) Private Insurance   Time In: 7:48  Time Out: 9:32  Total Billable (one on one) Time: 44 minutes    Precautions: Standard    Subjective     Pt reports: migraines for the past month following trial    Response to previous treatment: eye exercises are getting easier, headaches are more manageable   Functional change: ongoing    Pain: 3-4/10  Location: migraine pain with mild nausea     Headache Averages: In the past week due to increased work load: Frequency: Daily; Duration: Bhakti; Intensity: 5/10 with accompanied nausea     Date of Onset: 4 total concussions; most recent in 2018    Objective      MARY participated in neuromuscular re-education activities to improve: oculomotor function for 34 minutes. The following activities were included:  Reassessment/Assessment:   Oculomotor Exam  Vestibular/Ocular-Motor Screening (VOMS) for Concussion  Vestibular/Ocular Motor Test: Not Tested Headache   0-10 Dizziness  0-10 Nausea   0-10 Fogginess   0-10 eComments   Baseline   3-4 0 2-3 0  "typical Friday for me"   Smooth Pursuit  (1.5 feet left/right of center; 3 feet away; 2 times; 30 bpm each direction; horizontal and vertical)   3-4 0 2-3 0 Aware of HA but no change in intensity    Saccades - Horizontal  (1.5 feet left/right of center; 3 " feet away; 10 times; performed as quickly as possible)   3-4 0 2-3 0 Forehead pressure   Saccades - Vertical   (1.5 feet up/down of center; 3 feet away; 10 times; performed as quickly as possible)   3-4 0 2-3 0 Bottom one became double with task   Convergence (Near Point)  (14 pt font; stop when pt reports diplopia or outward deviation of eye is observed, blurring is ignored; measure distance between target and tip of nose; abnormal finding is >/= 6 cm)   3-4 1-2 (with eyes closed following) 2-3 0 (Near Point in cm):  Measure 1: 20 (w glasses)  Measure 2: 29 (w/ glasses)  Measure 3: 20 (w/ glasses)    VOR - Horizontal  (14 pt font; 20 degrees rotation left/right; 10 reps; 180 bpm)   3-4 6 7 0 No visual slippage; performed at appropriate speed   VOR - Vertical  (14 pt font; 20 degrees rotation up/down; 10 times; 180 bpm)   3-4 6 2-3 Unable to rate on numerical scale     Visual Motion Sensitivity Test  (80 degrees left/right; 5 times each direction; 50 bpm)   6-7 5 5 1 Turning to right felt worse       Oculomotor ROM: WNL, but pt reported double vision in RUQ   Eye Alignment: WNL  Visual Field: NT  Acuity: prism and filter lenses; has been seen by neuro opthalmology     Spontaneous Nystagmus: None  Gaze Holding Nystagmus: None  Gaze Holding (No Fixation): NT  Smooth Pursuits:   Horizontal: Smooth eye movements; no diplopia reported               Vertical: Smooth eye movements; no diplopia reported   Saccades:               Horizontal: Smooth eye movements; performed at consistent but slower pace; increased headache and nausea reported               Vertical: Smooth eye movements; performed at consistent but slower pace; increased headache and nausea reported; pt also reported high target became double towards the end of repetitions   Near [place] Convergence (cm):   Eval: Impaired; see above, has returned to increased deficit       VOR Slow Head Movement:   Eval: Intact   3/31/2023: NT  Head Thrust:   Eval: Grossly  intact bilaterally; occasional visual slippage with head left but infrequent   3/31/2023: NT  Dynamic Visual Acuity (DVA): NT        Motion Sensitivity Testing  Starting of symptoms: 5/10 with headache      Movement Intensity (0-5) Duration  < 5 = 0  5 - 10s = 1  11 - 30s = 2  >30s = 3 Score  Intensity + duration score   1. Sitting to Supine 0/5 (frontal) 0 0   2. Supine to left side 0/5 (floaters) 0 0   3. Supine to right side 1/5 0 0   4. Supine to sitting 1/5 (dizzy) 3 2   5. L [place] Hallpike      6. Up from left      7. R [place] Hallpike      8. Up from right      9. Sitting, head tipped to L knee      10. Head up from left knee      11. Sitting, head tipped to R knee      12. Head up from right knee      13. Sitting head turns (5)      14. Sitting head pitches (5)      15. In stance, 180 degree turn to L      16. In stance, 180 degree turn to R         Unable to complete the rest due to time constraints and needed time throughout for rest breaks     MARY participated in dynamic functional therapeutic activities to improve functional performance for 10  minutes, including:  -education on rest breaks and importance of them during stressful times; scheduling in rest breaks and ocular breaks into daily routine  -sleep hygiene   -discussed progress and some regressions; discussed plan of care       Home Exercises and Education Provided      Education provided:   - importance of rest breaks   -sleep hygiene   - HEP  - Progress towards goals    Written Home Exercises Provided: Patient instructed to cont prior HEP.  Exercises were reviewed and MARY was able to demonstrate them prior to the end of the session.    MARY demonstrated good  understanding of the education provided.     See EMR under Patient Instructions for exercises provided prior visit. (Pursuits, saccades, pencil push ups)   3/17/2023: oculomotor progressions (see pt instructions); accommodation activity   3/24/2023: oculomotor progressions (see pt  instructions); walking monocular and binocular convergence   4/4/2023: VORx1 hor/vert with near and far targets at 100 bpm x45 s  4/13/2023: oculomotor progressions (see pt instructions)   6/2/2023: oculomotor progressions; HEP review; added habituation activities     Assessment      Mary tolerated fairly (-) this session. She self reported a month of increased migraines due to poor sleep with work related tasks. Reached out to MD on this date for re order of speech language pathology due to ongoing deficits with executive functioning and time managment  impacting her daily life and work performance. Upon reassessment with VOMS, pt with an increase in her convergence (binocular) measurement. She does demonstrate an overall improvement in her dizziness and nausea with saccades; however, it remains with VOR and turning activities. She continues to require added time for all tasks completion. She does verbalize that she feels like overall OT has helped her manage and curb her symptoms. Pt would continue to benefit from skilled occupational therapy services to maximize oculomotor functioning and gaze stabilization, habituation for desensitization to environments/movements that elicit/increase symptoms, pt education on mindfulness/relaxation strategies, and HEP/HAP guidance to improve functional participation with meaningful occupations.    MARY is progressing well towards her goals and there are no updates to goals at this time. Pt prognosis is Fair-Guarded; Fair as pt has made improvements; Guarded due to hx of 4 concussions and underlying visual deficits.    Pt will continue to benefit from skilled outpatient occupational therapy to address the deficits listed in the problem list on initial evaluation provide pt/family education and to maximize pt's level of independence in the home and community environment.     Pt's spiritual, cultural and educational needs considered and pt agreeable to plan of care and  goals.    Anticipated barriers to occupational therapy: none noted     Goals:  Short Term Goals: 5 weeks   1) Pt will tolerate oculomotor and/or habitation home exercise/activity program, reporting at least 50% compliance. MET 3/31/2023  2) Pt will pace work tasks reported decreased post concussion headache to < / = 4/10 no more than 5x/wk. MET 3/31/2023  3) Pt will verbalize eye ergonomics to decrease stress on eyes. MET 3/31/2023  4) Near point convergence will decrease to 15 cm or less to improve oculomotor coordination and ability to fixate on close up work. MET 3/31/2023; remains 4/21  5) Pt will demonstrate ability to track single target WFL, in all directions, without onset of headache or dizziness, to improve skills needed for technology use and scanning environment. ongoing  6) Pt to perform saccades WFL, in all directions, without onset of headache or nausea, to improve skills needed for reading. Progressing   7) Pt to leisurely read for 15 minutes without eye fatigue, double vision, or increase in headache. ongoing  8) Pt to performed seated bending tasks and head turns/VOR with little to no onset of dizziness. ongoing; 8/10 dizziness with VOR 4/21  9) Pt to perform oculomotor exercises facing busy room, using busy card to decrease sensitivity to busy environments. Progressing      Long Term Goals: 10 weeks   1) Pt will be independent with oculomotor and/or habituation home exercise/activity program. ongoing  2) Pt will participate in work tasks reporting decreased post concussion headache to < / = 2/10 no more than 3x/wk using pacing strategies as needed. Progressing   3) Near point convergence will decrease to 10 cm or less to improve oculomotor coordination and ability to fixate on close up work. ongoing  4) Pt will change gaze between near and far targets without onset of headache or dizziness to improve accommodative flexibility, saccadic eye movements, and oculomotor coordination needed for work,  participation in [employer], and to maximize safety while driving. progressing with saccades  5) Pt to leisurely read for 30 minutes without eye fatigue, double vision, or increase in headache. ongoing  6) Pt will demonstrate ability to fixate/attend to close up/distance task x 30 minutes without onset of headache, dizziness, or eye fatigue. Ongoing/progressing; inconsistent   7) Pt will perform home making tasks such as laundry, cleaning, and cooking with little to no complaints of dizziness. ongoing  8) Pt will be able to engage in busy/loud environment (such as the grocery store/mall) for 30 minutes to increase ability to participate in community mobility and social outings. Ongoing    Patient has met 3 short term goals and is progressing towards the additional  Recent set back with high stress environment with work  Long term goals remain ongoing; no additional goals added at this time     Plan     Certification Period/Plan of care expiration: 20 visits total      Continue Outpatient Occupational Therapy 1 times weekly for 7 weeks to include the following interventions: Neuromuscular Re-ed, Patient Education, Self Care, Therapeutic Activities, and Therapeutic Exercise.    Updates/Grading for next session: complete the MSQ    motion tolerance activities (functional mobility & fast/sudden movements); add VOR cancellation to HEP for motion tolerance; continue to progress oculomotor exercises (busy environments/targets) including pursuits, saccades, accommodation, convergence, and VOR; cont to combine functional activities with eye exercises      ALFREDO Fink

## 2023-06-16 ENCOUNTER — PATIENT MESSAGE (OUTPATIENT)
Dept: REHABILITATION | Facility: HOSPITAL | Age: 53
End: 2023-06-16
Payer: COMMERCIAL

## 2023-06-16 ENCOUNTER — DOCUMENTATION ONLY (OUTPATIENT)
Dept: REHABILITATION | Facility: HOSPITAL | Age: 53
End: 2023-06-16
Payer: COMMERCIAL

## 2023-06-16 NOTE — PROGRESS NOTES
Patient is having car trouble and unable to make today's OT appt. Next appt is scheduled for 6/23/2023 at 7:45 AM.     MILLY Schumacher, ALFREDO  06/16/2023

## 2023-06-21 ENCOUNTER — OFFICE VISIT (OUTPATIENT)
Dept: OPTOMETRY | Facility: CLINIC | Age: 53
End: 2023-06-21
Payer: COMMERCIAL

## 2023-06-21 DIAGNOSIS — H50.34 INTERMITTENT ALTERNATING EXOTROPIA: ICD-10-CM

## 2023-06-21 DIAGNOSIS — H52.32 ANISOMETROPIA AND ANISEIKONIA: ICD-10-CM

## 2023-06-21 DIAGNOSIS — H52.31 ANISOMETROPIA AND ANISEIKONIA: ICD-10-CM

## 2023-06-21 DIAGNOSIS — H52.31 ANISOMETROPIA: ICD-10-CM

## 2023-06-21 DIAGNOSIS — H53.8 DYSFUNCTION OF VISUAL MOTION DETECTION: Primary | ICD-10-CM

## 2023-06-21 PROCEDURE — 92060 PR SPECIAL EYE EVAL,SENSORIMOTOR: ICD-10-PCS | Mod: S$GLB,,, | Performed by: OPTOMETRIST

## 2023-06-21 PROCEDURE — 1159F MED LIST DOCD IN RCRD: CPT | Mod: CPTII,S$GLB,, | Performed by: OPTOMETRIST

## 2023-06-21 PROCEDURE — 99999 PR PBB SHADOW E&M-EST. PATIENT-LVL II: CPT | Mod: PBBFAC,,, | Performed by: OPTOMETRIST

## 2023-06-21 PROCEDURE — 99215 PR OFFICE/OUTPT VISIT, EST, LEVL V, 40-54 MIN: ICD-10-PCS | Mod: S$GLB,,, | Performed by: OPTOMETRIST

## 2023-06-21 PROCEDURE — 92015 PR REFRACTION: ICD-10-PCS | Mod: S$GLB,,, | Performed by: OPTOMETRIST

## 2023-06-21 PROCEDURE — 92015 DETERMINE REFRACTIVE STATE: CPT | Mod: S$GLB,,, | Performed by: OPTOMETRIST

## 2023-06-21 PROCEDURE — 99999 PR PBB SHADOW E&M-EST. PATIENT-LVL II: ICD-10-PCS | Mod: PBBFAC,,, | Performed by: OPTOMETRIST

## 2023-06-21 PROCEDURE — 92060 SENSORIMOTOR EXAMINATION: CPT | Mod: S$GLB,,, | Performed by: OPTOMETRIST

## 2023-06-21 PROCEDURE — 99215 OFFICE O/P EST HI 40 MIN: CPT | Mod: S$GLB,,, | Performed by: OPTOMETRIST

## 2023-06-21 PROCEDURE — 1159F PR MEDICATION LIST DOCUMENTED IN MEDICAL RECORD: ICD-10-PCS | Mod: CPTII,S$GLB,, | Performed by: OPTOMETRIST

## 2023-06-21 NOTE — Clinical Note
Please  [facility] OD +5.25 - 0.75 x 120 OS +1.50 -0.75 x 030  Pair 2 OD +5.75 - 0.75 x 120 OS +2.00 -0.75 x 030  Rtc for [facility]FIT
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
## 2023-06-23 ENCOUNTER — CLINICAL SUPPORT (OUTPATIENT)
Dept: REHABILITATION | Facility: HOSPITAL | Age: 53
End: 2023-06-23
Payer: COMMERCIAL

## 2023-06-23 DIAGNOSIS — G44.309 POST-CONCUSSION HEADACHE: ICD-10-CM

## 2023-06-23 DIAGNOSIS — F07.81 POST CONCUSSION SYNDROME: Primary | ICD-10-CM

## 2023-06-23 DIAGNOSIS — H53.9 VISION ABNORMALITIES: ICD-10-CM

## 2023-06-23 PROCEDURE — 97530 THERAPEUTIC ACTIVITIES: CPT | Mod: PO

## 2023-06-23 NOTE — PATIENT INSTRUCTIONS
Horizontal, vertical, diagonal directions    Retrieved from:    HABITUATION TRAINING EXERCISES FOR DIZZINESS    Purpose:     These exercises are used to help reduce the dizziness you experience from moving around and/or from visually stimulating environments. The idea of these exercises is to repeatedly expose you to specific movements and/or visual stimuli that bring on the dizziness you experience.  With consistent exposure over time, your brain should not respond to these movements or visual stimuli, which can lead to a reduction of the dizziness. The exercises are designed to only cause a mild (or at most, a moderate) increase in dizziness and it should only last for a maximum of a few minutes after you stop doing them.     Monitoring symptoms:    Before starting the exercises, it is important for you to rate the dizziness you are experiencing by using the scale provided.  Ask yourself, What is the intensity of the dizziness right now on a scale of 0 to 10? A rating of zero means you have no dizziness at all and a rating of 10 means you have the worst symptoms imaginable.       0 - - - - - 1 - - - - - 2 - - - - - 3 - - - - - 4 - - - - - 5 - - - - - 6 - - - - - 7- - - - - 8 - - - - - 9 - - - - - 10    This is your baseline level. Monitor the dizziness as you do the exercises.     After each exercise, you need to stop and rest. The symptoms should decrease to your baseline level before you begin the next exercise.    If you are experiencing severe dizziness symptoms before you start the exercises, do not start them and let your physical therapist know.    Keep track of when you do the exercises and what symptoms you experience by using the log that is provided at the end.    When to adjust or stop the exercises:    Adjust the exercise (move slower, do fewer repetitions, or less time), if the dizziness you experience:  feels greater than a mild or moderate intensity.   lasts longer than a maximum of 15 - 20  minutes after you stop.    Stop an/the exercise(s), if you:  experience pain with it, including headache.   don't feel safe performing it.  you have adjusted the exercises and continue to have greater than mild to moderate increase in dizziness intensity or symptoms last longer than 15 - 20 minutes.    Let your therapist know if you need to adjust or stop an exercise so she/he can make sure that the exercise is right for you.        HABITUATION TRAINING EXERCISES FOR DIZZINESS    Movement Habituation Exercises:  You and your physical therapist will work together to identify which movements cause you to have mild to moderate dizziness symptoms.     Do the exercises that have been checked off, as follows:   Days per week: 5  Times per day: 1  # repetitions for each exercise: 10 (any amount of laps during walking with head turns, and also perform functionally)   # sets per exercise:   1-2  To adjust the intensity, change the: 1. Speed, 2. Amount, or 3. Movement size.    1. Walking with head turns     2. Bending then coming up (from 1) seated and 2) standing)     3. Lying down to sitting up

## 2023-06-25 ENCOUNTER — PATIENT MESSAGE (OUTPATIENT)
Dept: REHABILITATION | Facility: HOSPITAL | Age: 53
End: 2023-06-25
Payer: COMMERCIAL
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
## 2023-06-30 ENCOUNTER — CLINICAL SUPPORT (OUTPATIENT)
Dept: REHABILITATION | Facility: HOSPITAL | Age: 53
End: 2023-06-30
Payer: COMMERCIAL

## 2023-06-30 ENCOUNTER — OFFICE VISIT (OUTPATIENT)
Dept: PSYCHIATRY | Facility: CLINIC | Age: 53
End: 2023-06-30
Payer: COMMERCIAL

## 2023-06-30 DIAGNOSIS — H53.9 VISION ABNORMALITIES: ICD-10-CM

## 2023-06-30 DIAGNOSIS — F07.81 POST CONCUSSION SYNDROME: ICD-10-CM

## 2023-06-30 DIAGNOSIS — F33.1 MODERATE EPISODE OF RECURRENT MAJOR DEPRESSIVE DISORDER: Primary | ICD-10-CM

## 2023-06-30 DIAGNOSIS — F07.81 POST CONCUSSION SYNDROME: Primary | ICD-10-CM

## 2023-06-30 DIAGNOSIS — G44.309 POST-CONCUSSION HEADACHE: ICD-10-CM

## 2023-06-30 DIAGNOSIS — F41.1 GENERALIZED ANXIETY DISORDER: ICD-10-CM

## 2023-06-30 PROCEDURE — 1159F PR MEDICATION LIST DOCUMENTED IN MEDICAL RECORD: ICD-10-PCS | Mod: CPTII,95,, | Performed by: STUDENT IN AN ORGANIZED HEALTH CARE EDUCATION/TRAINING PROGRAM

## 2023-06-30 PROCEDURE — 99214 PR OFFICE/OUTPT VISIT, EST, LEVL IV, 30-39 MIN: ICD-10-PCS | Mod: 95,,, | Performed by: STUDENT IN AN ORGANIZED HEALTH CARE EDUCATION/TRAINING PROGRAM

## 2023-06-30 PROCEDURE — 97530 THERAPEUTIC ACTIVITIES: CPT | Mod: PO

## 2023-06-30 PROCEDURE — 1159F MED LIST DOCD IN RCRD: CPT | Mod: CPTII,95,, | Performed by: STUDENT IN AN ORGANIZED HEALTH CARE EDUCATION/TRAINING PROGRAM

## 2023-06-30 PROCEDURE — 1160F PR REVIEW ALL MEDS BY PRESCRIBER/CLIN PHARMACIST DOCUMENTED: ICD-10-PCS | Mod: CPTII,95,, | Performed by: STUDENT IN AN ORGANIZED HEALTH CARE EDUCATION/TRAINING PROGRAM

## 2023-06-30 PROCEDURE — 99214 OFFICE O/P EST MOD 30 MIN: CPT | Mod: 95,,, | Performed by: STUDENT IN AN ORGANIZED HEALTH CARE EDUCATION/TRAINING PROGRAM

## 2023-06-30 PROCEDURE — 1160F RVW MEDS BY RX/DR IN RCRD: CPT | Mod: CPTII,95,, | Performed by: STUDENT IN AN ORGANIZED HEALTH CARE EDUCATION/TRAINING PROGRAM

## 2023-06-30 RX ORDER — ALPRAZOLAM 0.5 MG/1
0.5 TABLET ORAL 2 TIMES DAILY
Qty: 60 TABLET | Refills: 2 | Status: SHIPPED | OUTPATIENT
Start: 2023-06-30 | End: 2023-09-28

## 2023-06-30 RX ORDER — BUPROPION HYDROCHLORIDE 300 MG/1
300 TABLET ORAL DAILY
Qty: 90 TABLET | Refills: 0 | Status: SHIPPED | OUTPATIENT
Start: 2023-06-30 | End: 2023-10-12 | Stop reason: SDUPTHER

## 2023-06-30 RX ORDER — DULOXETIN HYDROCHLORIDE 60 MG/1
60 CAPSULE, DELAYED RELEASE ORAL DAILY
Qty: 90 CAPSULE | Refills: 0 | Status: SHIPPED | OUTPATIENT
Start: 2023-06-30 | End: 2023-10-12 | Stop reason: SDUPTHER
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
## 2023-07-07 ENCOUNTER — CLINICAL SUPPORT (OUTPATIENT)
Dept: REHABILITATION | Facility: HOSPITAL | Age: 53
End: 2023-07-07
Payer: COMMERCIAL

## 2023-07-07 DIAGNOSIS — G44.309 POST-CONCUSSION HEADACHE: ICD-10-CM

## 2023-07-07 DIAGNOSIS — H53.9 VISION ABNORMALITIES: ICD-10-CM

## 2023-07-07 DIAGNOSIS — F07.81 POST CONCUSSION SYNDROME: Primary | ICD-10-CM

## 2023-07-07 PROCEDURE — 97530 THERAPEUTIC ACTIVITIES: CPT | Mod: PO

## 2023-07-07 NOTE — PROGRESS NOTES
Occupational Therapy Treatment Note     Date: 7/7/2023  Name: Mary Lundy  Clinic Number: [id number]    Therapy Diagnosis:   Encounter Diagnoses   Name Primary?    Post concussion syndrome Yes    Vision abnormalities     Post-concussion headache      Physician: Wei Lin III, MD    Physician Orders: Eval and Treat - oculomotor rehab   Medical Diagnosis: F07.81 (ICD-10-CM) - Post-concussion syndrome  Evaluation Date: 1/19/2023  Insurance Authorization Period Expiration: 12/31/2023  Plan of Care Certification Period: 20 total visits   Date of Return to MD:  7/26/2023: Neurology     Visit # / Visits authorized: 13 / 20 (+evaluation)   Time In: 7:49 AM  Time Out: 8:29 AM  Total Billable (one on one) Time: 40 minutes    Precautions: Standard    Subjective     Pt reports: decrease in headaches this week, noise is more tolerable, has had louzy sleep this week due to hormone medication changes   Response to previous treatment: eye exercises are getting easier, headaches are more manageable   Functional change: ongoing     Pain: 2/10  Location: headache     Date of Onset: 4 total concussions; most recent in 2018     Objective      MARY participated in dynamic functional therapeutic activities to improve functional performance for 40 minutes, including:    Seated EOM:  - Horizontal ball handoffs over right/left shoulder, 1 x 1 minute each direction  - Ball taps from floor > overhead, 2 x 20 each at self-selected pace     Standing:   - Walking with horizontal/vertical/diagonal head turns, 2 laps x 80ft each direction     - 10 minute rest break provided in dark and quiet room to let symptoms settle     Home Exercises and Education Provided      Education provided:   - importance of rest breaks   - sleep hygiene   - HEP  - Progress towards goals     Written Home Exercises Provided: Patient instructed to cont prior HEP.  Exercises were reviewed and MARY was able to demonstrate them prior to the end of the session.     MARY demonstrated good  understanding of the education provided.      See EMR under Patient Instructions for exercises provided prior visit. (Pursuits, saccades, pencil push ups)   3/17/2023: oculomotor progressions (see pt instructions); accommodation activity   3/24/2023: oculomotor progressions (see pt instructions); walking monocular and binocular convergence   4/4/2023: VORx1 hor/vert with near and far targets at 100 bpm x45 s  4/13/2023: oculomotor progressions (see pt instructions)   6/2/2023: oculomotor progressions; HEP review; added habituation activities   6/23/2023: habituation HAP     Assessment      Patient tolerated today's session fairly. Patient reported noticing decrease in headaches and phonophobia. Today's session focused on motion tolerance as patient consistently reports independent compliance with oculomotor HEP. Most difficulty noted with walking with HT in hallway as auditory and visual stimuli were present; patient required 10 minute rest break in dark/quiet room after horizontal and vertical directions prior to completing diagonal head turns. Patient reported no nausea felt throughout entire session, only mild dizziness and head pressure. Patient would continue to benefit from skilled occupational therapy services to maximize oculomotor functioning and gaze stabilization, habituation for desensitization to environments/movements that elicit/increase symptoms, pt education on mindfulness/relaxation strategies, and HEP/HAP guidance to improve functional participation with meaningful occupations.    MARY is progressing well towards her goals and updates to goals can be seen below. Pt prognosis is Fair-Guarded; Fair as pt has made improvements; Guarded due to hx of 4 concussions and underlying visual deficits.    Pt will continue to benefit from skilled outpatient occupational therapy to address the deficits listed in the problem list on initial evaluation provide pt/family education  and to maximize pt's level of independence in the home and community environment.     Pt's spiritual, cultural and educational needs considered and pt agreeable to plan of care and goals.    Anticipated barriers to occupational therapy: none noted    Goals:  Short Term Goals: 5 weeks   1) Pt will tolerate oculomotor and/or habitation home exercise/activity program, reporting at least 50% compliance. MET 3/31/2023  2) Pt will pace work tasks reported decreased post concussion headache to < / = 4/10 no more than 5x/wk. MET 3/31/2023  3) Pt will verbalize eye ergonomics to decrease stress on eyes. MET 3/31/2023  4) Near point convergence will decrease to 15 cm or less to improve oculomotor coordination and ability to fixate on close up work. MET 3/31/2023; remains 4/21  5) Pt will demonstrate ability to track single target WFL, in all directions, without onset of headache or dizziness, to improve skills needed for technology use and scanning environment. ongoing  6) Pt to perform saccades WFL, in all directions, without onset of headache or nausea, to improve skills needed for reading. Progressing   7) Pt to leisurely read for 15 minutes without eye fatigue, double vision, or increase in headache. ongoing  8) Pt to performed seated bending tasks and head turns/VOR with little to no onset of dizziness. ongoing; 8/10 dizziness with VOR 4/21  9) Pt to perform oculomotor exercises facing busy room, using busy card to decrease sensitivity to busy environments. Progressing      Long Term Goals: 10 weeks   1) Pt will be independent with oculomotor and/or habituation home exercise/activity program. ongoing  2) Pt will participate in work tasks reporting decreased post concussion headache to < / = 2/10 no more than 3x/wk using pacing strategies as needed. Progressing   3) Near point convergence will decrease to 10 cm or less to improve oculomotor coordination and ability to fixate on close up work. ongoing  4) Pt will change  gaze between near and far targets without onset of headache or dizziness to improve accommodative flexibility, saccadic eye movements, and oculomotor coordination needed for work, participation in ballet, and to maximize safety while driving. progressing with saccades  5) Pt to leisurely read for 30 minutes without eye fatigue, double vision, or increase in headache. ongoing  6) Pt will demonstrate ability to fixate/attend to close up/distance task x 30 minutes without onset of headache, dizziness, or eye fatigue. Ongoing/progressing; inconsistent   7) Pt will perform home making tasks such as laundry, cleaning, and cooking with little to no complaints of dizziness. ongoing  8) Pt will be able to engage in busy/loud environment (such as the grocery store/mall) for 30 minutes to increase ability to participate in community mobility and social outings. Ongoing    Plan     Certification Period/Plan of care expiration: 20 visits total      Continue Outpatient Occupational Therapy 1 times weekly for 7 weeks to include the following interventions: Neuromuscular Re-ed, Patient Education, Self Care, Therapeutic Activities, and Therapeutic Exercise.    Updates/Grading for next session: motion tolerance activities (functional mobility, fast/sudden movements, HT, and coming up from positions, sit > stand w/ walk); add VOR cancellation to HEP for motion tolerance; continue to progress oculomotor exercises (busy environments/targets) including pursuits, saccades, accommodation, convergence, and VOR; cont to combine functional activities with eye exercises    MARIUM Stoddard     I certify that I was present in the room directing the student in service delivery and guiding them using my skilled judgment. As the co-signing therapist I have reviewed the students documentation and am responsible for the treatment, assessment, and plan.     Joyce Rodriguez, MILLY, LOTR  07/07/2023

## 2023-07-12 ENCOUNTER — OFFICE VISIT (OUTPATIENT)
Dept: OPTOMETRY | Facility: CLINIC | Age: 53
End: 2023-07-12
Payer: COMMERCIAL

## 2023-07-12 DIAGNOSIS — H53.8 DYSFUNCTION OF VISUAL MOTION DETECTION: Primary | ICD-10-CM

## 2023-07-12 PROCEDURE — 99213 PR OFFICE/OUTPT VISIT, EST, LEVL III, 20-29 MIN: ICD-10-PCS | Mod: S$GLB,,, | Performed by: OPTOMETRIST

## 2023-07-12 PROCEDURE — 99213 OFFICE O/P EST LOW 20 MIN: CPT | Mod: S$GLB,,, | Performed by: OPTOMETRIST

## 2023-07-12 PROCEDURE — 1159F PR MEDICATION LIST DOCUMENTED IN MEDICAL RECORD: ICD-10-PCS | Mod: CPTII,S$GLB,, | Performed by: OPTOMETRIST

## 2023-07-12 PROCEDURE — 99999 PR PBB SHADOW E&M-EST. PATIENT-LVL II: CPT | Mod: PBBFAC,,, | Performed by: OPTOMETRIST

## 2023-07-12 PROCEDURE — 1159F MED LIST DOCD IN RCRD: CPT | Mod: CPTII,S$GLB,, | Performed by: OPTOMETRIST

## 2023-07-12 PROCEDURE — 99999 PR PBB SHADOW E&M-EST. PATIENT-LVL II: ICD-10-PCS | Mod: PBBFAC,,, | Performed by: OPTOMETRIST
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
## 2023-07-14 ENCOUNTER — PATIENT MESSAGE (OUTPATIENT)
Dept: REHABILITATION | Facility: HOSPITAL | Age: 53
End: 2023-07-14
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
## 2023-07-21 ENCOUNTER — CLINICAL SUPPORT (OUTPATIENT)
Dept: REHABILITATION | Facility: HOSPITAL | Age: 53
End: 2023-07-21
Payer: COMMERCIAL

## 2023-07-21 DIAGNOSIS — H53.9 VISION ABNORMALITIES: ICD-10-CM

## 2023-07-21 DIAGNOSIS — F07.81 POST CONCUSSION SYNDROME: Primary | ICD-10-CM

## 2023-07-21 DIAGNOSIS — G44.309 POST-CONCUSSION HEADACHE: ICD-10-CM

## 2023-07-21 PROCEDURE — 97530 THERAPEUTIC ACTIVITIES: CPT | Mod: PO

## 2023-07-25 ENCOUNTER — PATIENT MESSAGE (OUTPATIENT)
Dept: OPTOMETRY | Facility: CLINIC | Age: 53
End: 2023-07-25
Payer: COMMERCIAL

## 2023-07-26 ENCOUNTER — PATIENT MESSAGE (OUTPATIENT)
Dept: NEUROLOGY | Facility: CLINIC | Age: 53
End: 2023-07-26

## 2023-07-26 ENCOUNTER — OFFICE VISIT (OUTPATIENT)
Dept: NEUROLOGY | Facility: CLINIC | Age: 53
End: 2023-07-26
Payer: COMMERCIAL

## 2023-07-26 VITALS
HEIGHT: 63 IN | HEART RATE: 82 BPM | WEIGHT: 138.88 LBS | BODY MASS INDEX: 24.61 KG/M2 | SYSTOLIC BLOOD PRESSURE: 112 MMHG | DIASTOLIC BLOOD PRESSURE: 67 MMHG

## 2023-07-26 DIAGNOSIS — M50.30 DDD (DEGENERATIVE DISC DISEASE), CERVICAL: ICD-10-CM

## 2023-07-26 DIAGNOSIS — F43.10 PTSD (POST-TRAUMATIC STRESS DISORDER): ICD-10-CM

## 2023-07-26 DIAGNOSIS — H51.9 CONVERGENCE INSUFFICIENCY OR PALSY IN BINOCULAR EYE MOVEMENT: ICD-10-CM

## 2023-07-26 DIAGNOSIS — S06.9XAS COGNITIVE AND NEUROBEHAVIORAL DYSFUNCTION FOLLOWING BRAIN INJURY: ICD-10-CM

## 2023-07-26 DIAGNOSIS — G31.89 COGNITIVE AND NEUROBEHAVIORAL DYSFUNCTION FOLLOWING BRAIN INJURY: ICD-10-CM

## 2023-07-26 DIAGNOSIS — Z87.820 HISTORY OF TRAUMATIC BRAIN INJURY: ICD-10-CM

## 2023-07-26 DIAGNOSIS — F09 COGNITIVE AND NEUROBEHAVIORAL DYSFUNCTION FOLLOWING BRAIN INJURY: ICD-10-CM

## 2023-07-26 DIAGNOSIS — G43.709 CHRONIC MIGRAINE WITHOUT AURA WITHOUT STATUS MIGRAINOSUS, NOT INTRACTABLE: Primary | ICD-10-CM

## 2023-07-26 DIAGNOSIS — T75.09XS ACCIDENT CAUSED BY LIGHTNING, SEQUELA: ICD-10-CM

## 2023-07-26 PROCEDURE — 1159F MED LIST DOCD IN RCRD: CPT | Mod: CPTII,S$GLB,, | Performed by: PSYCHIATRY & NEUROLOGY

## 2023-07-26 PROCEDURE — 1160F PR REVIEW ALL MEDS BY PRESCRIBER/CLIN PHARMACIST DOCUMENTED: ICD-10-PCS | Mod: CPTII,S$GLB,, | Performed by: PSYCHIATRY & NEUROLOGY

## 2023-07-26 PROCEDURE — 1159F PR MEDICATION LIST DOCUMENTED IN MEDICAL RECORD: ICD-10-PCS | Mod: CPTII,S$GLB,, | Performed by: PSYCHIATRY & NEUROLOGY

## 2023-07-26 PROCEDURE — 99214 PR OFFICE/OUTPT VISIT, EST, LEVL IV, 30-39 MIN: ICD-10-PCS | Mod: S$GLB,,, | Performed by: PSYCHIATRY & NEUROLOGY

## 2023-07-26 PROCEDURE — 99999 PR PBB SHADOW E&M-EST. PATIENT-LVL IV: ICD-10-PCS | Mod: PBBFAC,,, | Performed by: PSYCHIATRY & NEUROLOGY

## 2023-07-26 PROCEDURE — 1160F RVW MEDS BY RX/DR IN RCRD: CPT | Mod: CPTII,S$GLB,, | Performed by: PSYCHIATRY & NEUROLOGY

## 2023-07-26 PROCEDURE — 3008F PR BODY MASS INDEX (BMI) DOCUMENTED: ICD-10-PCS | Mod: CPTII,S$GLB,, | Performed by: PSYCHIATRY & NEUROLOGY

## 2023-07-26 PROCEDURE — 3078F DIAST BP <80 MM HG: CPT | Mod: CPTII,S$GLB,, | Performed by: PSYCHIATRY & NEUROLOGY

## 2023-07-26 PROCEDURE — 99214 OFFICE O/P EST MOD 30 MIN: CPT | Mod: S$GLB,,, | Performed by: PSYCHIATRY & NEUROLOGY

## 2023-07-26 PROCEDURE — 3074F SYST BP LT 130 MM HG: CPT | Mod: CPTII,S$GLB,, | Performed by: PSYCHIATRY & NEUROLOGY

## 2023-07-26 PROCEDURE — 3074F PR MOST RECENT SYSTOLIC BLOOD PRESSURE < 130 MM HG: ICD-10-PCS | Mod: CPTII,S$GLB,, | Performed by: PSYCHIATRY & NEUROLOGY

## 2023-07-26 PROCEDURE — 3008F BODY MASS INDEX DOCD: CPT | Mod: CPTII,S$GLB,, | Performed by: PSYCHIATRY & NEUROLOGY

## 2023-07-26 PROCEDURE — 99999 PR PBB SHADOW E&M-EST. PATIENT-LVL IV: CPT | Mod: PBBFAC,,, | Performed by: PSYCHIATRY & NEUROLOGY

## 2023-07-26 PROCEDURE — 3078F PR MOST RECENT DIASTOLIC BLOOD PRESSURE < 80 MM HG: ICD-10-PCS | Mod: CPTII,S$GLB,, | Performed by: PSYCHIATRY & NEUROLOGY
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
## 2023-07-28 ENCOUNTER — CLINICAL SUPPORT (OUTPATIENT)
Dept: REHABILITATION | Facility: HOSPITAL | Age: 53
End: 2023-07-28
Payer: COMMERCIAL

## 2023-07-28 DIAGNOSIS — G44.309 POST-CONCUSSION HEADACHE: ICD-10-CM

## 2023-07-28 DIAGNOSIS — H53.9 VISION ABNORMALITIES: ICD-10-CM

## 2023-07-28 DIAGNOSIS — F07.81 POST CONCUSSION SYNDROME: Primary | ICD-10-CM

## 2023-07-28 PROCEDURE — 97112 NEUROMUSCULAR REEDUCATION: CPT | Mod: PO

## 2023-07-28 PROCEDURE — 97530 THERAPEUTIC ACTIVITIES: CPT | Mod: PO
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
## 2023-07-28 NOTE — PATIENT INSTRUCTIONS
Perform in horizontal and vertical directions. 1 x 45 seconds at 100 bpm. Keep card still and turn head maintaining gaze on target. Try to use busy card.         Perform in horizontal, vertical, and diagonal directions. 1 x 1 minute each. Practice using both near and far targets. Move target slowly, keep head still, and track with eyes. Use busy card with near target when possible.      Perform in horizontal, vertical, and diagonal directions. 1 x 1 minute each at 90 bpm but go faster if this feels easy (no more than 120!). Move eyes between targets as fast as you can that they remain in focus, keep head still. Use busy card.         Perform 2 x 10. Hold near point for 5-10 seconds.        - Add accommodation task: Choose a near and far target at midline. Change gaze between the two targets ensuring that each come into focus. Perform for 1 minute.       Perform in horizontal/vertical/diagonal directions.       Retrieved from:           HABITUATION TRAINING EXERCISES FOR DIZZINESS    Purpose:     These exercises are used to help reduce the dizziness you experience from moving around and/or from visually stimulating environments. The idea of these exercises is to repeatedly expose you to specific movements and/or visual stimuli that bring on the dizziness you experience.  With consistent exposure over time, your brain should not respond to these movements or visual stimuli, which can lead to a reduction of the dizziness. The exercises are designed to only cause a mild (or at most, a moderate) increase in dizziness and it should only last for a maximum of a few minutes after you stop doing them.     Monitoring symptoms:    Before starting the exercises, it is important for you to rate the dizziness you are experiencing by using the scale provided.  Ask yourself, What is the intensity of the dizziness right now on a scale of 0 to 10? A rating of zero means you have no dizziness at all and a rating of 10 means you  have the worst symptoms imaginable.       0 - - - - - 1 - - - - - 2 - - - - - 3 - - - - - 4 - - - - - 5 - - - - - 6 - - - - - 7- - - - - 8 - - - - - 9 - - - - - 10    This is your baseline level. Monitor the dizziness as you do the exercises.     After each exercise, you need to stop and rest. The symptoms should decrease to your baseline level before you begin the next exercise.    If you are experiencing severe dizziness symptoms before you start the exercises, do not start them and let your physical therapist know.    Keep track of when you do the exercises and what symptoms you experience by using the log that is provided at the end.    When to adjust or stop the exercises:    Adjust the exercise (move slower, do fewer repetitions, or less time), if the dizziness you experience:  feels greater than a mild or moderate intensity.   lasts longer than a maximum of 15 - 20 minutes after you stop.    Stop an/the exercise(s), if you:  experience pain with it, including headache.   don't feel safe performing it.  you have adjusted the exercises and continue to have greater than mild to moderate increase in dizziness intensity or symptoms last longer than 15 - 20 minutes.    Let your therapist know if you need to adjust or stop an exercise so she/he can make sure that the exercise is right for you.      HABITUATION TRAINING EXERCISES FOR DIZZINESS    Movement Habituation Exercises:  You and your physical therapist will work together to identify which movements cause you to have mild to moderate dizziness symptoms.     Do the exercises that have been checked off, as follows:   Days per week: 3  Times per day: 1  # repetitions for each exercise:   10 (any amount of laps during walking with head turns, and also perform functionally  # sets per exercise: 10  To adjust the intensity, change the: 1. Speed, 2. Amount, or 3. Movement size.    1. Walking with head turns      2. Bending then coming up from 1) seated and 2)  standing     3. Lying down to sitting up

## 2023-08-02 ENCOUNTER — PATIENT MESSAGE (OUTPATIENT)
Dept: PSYCHIATRY | Facility: CLINIC | Age: 53
End: 2023-08-02

## 2023-08-24 ENCOUNTER — OFFICE VISIT (OUTPATIENT)
Dept: OPTOMETRY | Facility: CLINIC | Age: 53
End: 2023-08-24
Payer: COMMERCIAL

## 2023-08-24 DIAGNOSIS — H52.31 ANISOMETROPIA: ICD-10-CM

## 2023-08-24 DIAGNOSIS — F07.81 POST CONCUSSION SYNDROME: Primary | ICD-10-CM

## 2023-08-24 DIAGNOSIS — H53.143 PHOTOPHOBIA OF BOTH EYES: ICD-10-CM

## 2023-08-24 PROCEDURE — 1159F PR MEDICATION LIST DOCUMENTED IN MEDICAL RECORD: ICD-10-PCS | Mod: CPTII,S$GLB,, | Performed by: OPTOMETRIST

## 2023-08-24 PROCEDURE — 99999 PR PBB SHADOW E&M-EST. PATIENT-LVL II: CPT | Mod: PBBFAC,,, | Performed by: OPTOMETRIST

## 2023-08-24 PROCEDURE — 99215 OFFICE O/P EST HI 40 MIN: CPT | Mod: S$GLB,,, | Performed by: OPTOMETRIST

## 2023-08-24 PROCEDURE — 99999 PR PBB SHADOW E&M-EST. PATIENT-LVL II: ICD-10-PCS | Mod: PBBFAC,,, | Performed by: OPTOMETRIST

## 2023-08-24 PROCEDURE — 1159F MED LIST DOCD IN RCRD: CPT | Mod: CPTII,S$GLB,, | Performed by: OPTOMETRIST

## 2023-08-24 PROCEDURE — 99215 PR OFFICE/OUTPT VISIT, EST, LEVL V, 40-54 MIN: ICD-10-PCS | Mod: S$GLB,,, | Performed by: OPTOMETRIST

## 2023-08-24 RX ORDER — ESTRADIOL 0.1 MG/D
FILM, EXTENDED RELEASE TRANSDERMAL
COMMUNITY
End: 2023-11-17

## 2023-08-24 RX ORDER — AZELASTINE HYDROCHLORIDE, FLUTICASONE PROPIONATE 137; 50 UG/1; UG/1
SPRAY, METERED NASAL
COMMUNITY
End: 2023-09-14 | Stop reason: ALTCHOICE

## 2023-08-24 RX ORDER — TESTOSTERONE 10 MG/.5G
GEL, METERED TOPICAL
COMMUNITY

## 2023-08-24 RX ORDER — ALPRAZOLAM 0.25 MG/1
0.5 TABLET ORAL 2 TIMES DAILY
COMMUNITY
Start: 2023-06-01 | End: 2023-11-07 | Stop reason: SDUPTHER

## 2023-08-24 RX ORDER — ESTRADIOL 0.1 MG/G
CREAM VAGINAL
COMMUNITY
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
## 2023-09-14 ENCOUNTER — OFFICE VISIT (OUTPATIENT)
Dept: URGENT CARE | Facility: CLINIC | Age: 53
End: 2023-09-14
Payer: COMMERCIAL

## 2023-09-14 ENCOUNTER — ON-DEMAND VIRTUAL (OUTPATIENT)
Dept: URGENT CARE | Facility: CLINIC | Age: 53
End: 2023-09-14
Payer: COMMERCIAL

## 2023-09-14 VITALS
RESPIRATION RATE: 18 BRPM | HEIGHT: 63 IN | DIASTOLIC BLOOD PRESSURE: 85 MMHG | TEMPERATURE: 99 F | HEART RATE: 75 BPM | OXYGEN SATURATION: 98 % | BODY MASS INDEX: 24.61 KG/M2 | SYSTOLIC BLOOD PRESSURE: 126 MMHG | WEIGHT: 138.88 LBS

## 2023-09-14 DIAGNOSIS — J31.0 RHINITIS, UNSPECIFIED TYPE: ICD-10-CM

## 2023-09-14 DIAGNOSIS — R09.82 POST-NASAL DRIP: ICD-10-CM

## 2023-09-14 DIAGNOSIS — J06.9 VIRAL URI WITH COUGH: Primary | ICD-10-CM

## 2023-09-14 DIAGNOSIS — R09.81 NASAL CONGESTION: ICD-10-CM

## 2023-09-14 LAB
CTP QC/QA: YES
SARS-COV-2 AG RESP QL IA.RAPID: NEGATIVE

## 2023-09-14 PROCEDURE — 99212 PR OFFICE/OUTPT VISIT, EST, LEVL II, 10-19 MIN: ICD-10-PCS | Mod: 95,,, | Performed by: NURSE PRACTITIONER

## 2023-09-14 PROCEDURE — 99212 OFFICE O/P EST SF 10 MIN: CPT | Mod: 95,,, | Performed by: NURSE PRACTITIONER

## 2023-09-14 PROCEDURE — 87811 SARS CORONAVIRUS 2 ANTIGEN POCT, MANUAL READ: ICD-10-PCS | Mod: QW,S$GLB,, | Performed by: NURSE PRACTITIONER

## 2023-09-14 PROCEDURE — 99213 OFFICE O/P EST LOW 20 MIN: CPT | Mod: S$GLB,,, | Performed by: NURSE PRACTITIONER

## 2023-09-14 PROCEDURE — 87811 SARS-COV-2 COVID19 W/OPTIC: CPT | Mod: QW,S$GLB,, | Performed by: NURSE PRACTITIONER

## 2023-09-14 PROCEDURE — 99213 PR OFFICE/OUTPT VISIT, EST, LEVL III, 20-29 MIN: ICD-10-PCS | Mod: S$GLB,,, | Performed by: NURSE PRACTITIONER

## 2023-09-14 RX ORDER — CETIRIZINE HYDROCHLORIDE 10 MG/1
10 TABLET ORAL DAILY PRN
Qty: 30 TABLET | Refills: 0 | Status: SHIPPED | OUTPATIENT
Start: 2023-09-14 | End: 2023-11-16 | Stop reason: SDUPTHER

## 2023-09-14 RX ORDER — FLUTICASONE PROPIONATE 50 MCG
2 SPRAY, SUSPENSION (ML) NASAL DAILY PRN
Qty: 15.8 ML | Refills: 0 | Status: SHIPPED | OUTPATIENT
Start: 2023-09-14 | End: 2023-11-16 | Stop reason: SDUPTHER

## 2023-09-14 RX ORDER — AZELASTINE 1 MG/ML
1 SPRAY, METERED NASAL 2 TIMES DAILY PRN
Qty: 30 ML | Refills: 0 | Status: SHIPPED | OUTPATIENT
Start: 2023-09-14 | End: 2023-11-16 | Stop reason: SDUPTHER

## 2023-09-14 RX ORDER — BENZONATATE 100 MG/1
100 CAPSULE ORAL 3 TIMES DAILY PRN
Qty: 30 CAPSULE | Refills: 0 | Status: SHIPPED | OUTPATIENT
Start: 2023-09-14 | End: 2023-11-16 | Stop reason: SDUPTHER

## 2023-09-14 NOTE — PATIENT INSTRUCTIONS
See additional patient Instructions provided    - Rest.    - Drink plenty of fluids.  - Bacterial infections can be treated with oral antibiotics, however, Viral upper respiratory infections will not respond to antibiotics but with simple symptomatic care, typically run their course in 10-14 days.     - Tylenol or Ibuprofen as directed as needed for fever/pain. Avoid tylenol if you have a history of liver disease. Do not take ibuprofen if you have a history of GI bleeding, kidney disease, or if you take blood thinners.     - You can take over-the-counter claritin, zyrtec, allegra, or xyzal as directed. These are antihistamines that can help with runny nose, nasal congestion, sneezing, and helps to dry up post-nasal drip, which usually causes sore throat and cough.   - If you do NOT have high blood pressure, you may use a decongestant form (D)  of this medication (ie. Claritin- D, zyrtec-D, allegra-D) or if you do not take the D form, you can take sudafed (pseudoephedrine) over the counter, which is a decongestant. Do NOT take two decongestant (D) medications at the same time (such as mucinex-D and claritin-D or plain sudafed and claritin D)    - You can use Flonase (fluticasone) nasal spray as directed for sinus congestion and postnasal drip. This is a steroid nasal spray that works locally over time to decrease the inflammation in your nose/sinuses and help with allergic symptoms. This is not an quick- relief spray like afrin, but it works well if used daily.  Discontinue if you develop nose bleed  - use nasal saline prior to Flonase.  - Use Ocean Spray Nasal Saline 1-3 puffs each nostril every 2-3 hours then blow out onto tissue. This is to irrigate the nasal passage way to clear the sinus openings. Use until sinus problem resolved.    - you can take plain Mucinex (guaifenesin) 1200 mg twice a day to help loosen mucous.     -warm salt water gargles can help with sore throat    - warm tea with honey can help with  cough. Honey is a natural cough suppressant.    - Dextromethorphan (DM) is a cough suppressant over the counter (ie. mucinex DM, robitussin, delsym; dayquil/nyquil has DM as well.)    - Follow up with your PCP or specialty clinic as directed in the next 1-2 weeks if not improved or as needed.  You can call [phone] to schedule an appointment with the appropriate provider.      - Go to the ER if you develop new or worsening symptoms.     - You must understand that you have received an Urgent Care treatment only and that you may be released before all of your medical problems are known or treated.   - You, the patient, will arrange for follow up care as instructed.   - If your condition worsens or fails to improve we recommend that you receive another evaluation at the ER immediately or contact your PCP to discuss your concerns or return here.     Patient Instructions   - You must understand that you have received an Urgent Care treatment only and that you may be released before all of your medical problems are known or treated.   - You, the patient, will arrange for follow up care as instructed.   - If your condition worsens or fails to improve we recommend that you receive another evaluation at the ER immediately or contact your PCP to discuss your concerns or return here.     Advised on return/follow-up precautions. Advised on ER precautions. Answered all patient questions. Patient verbalized understanding and voiced agreement with current treatment plan.

## 2023-09-14 NOTE — PATIENT INSTRUCTIONS
Please drink plenty of fluids.  Please get plenty of rest.  Please return here or go to the Emergency Department for any concerns or worsening of condition.  It is ok to take over the counter plain Allegra or Claritin or Zyrtec.   We recommend you take Flonase (Fluticasone) or another nasally inhaled steroid unless you are already taking one.  Nasal irrigation with a saline spray or Netti Pot like device per their directions is also recommended.  If not allergic, please take over the counter Tylenol (Acetaminophen) and/or Motrin (Ibuprofen) as directed for control of pain and/or fever.  Please follow up with your primary care doctor or specialist as needed.    If you  smoke, please stop smoking.

## 2023-09-14 NOTE — PROGRESS NOTES
"Subjective:      Patient ID: Yanelis Lundy is a 53 y.o. female.    Vitals:  height is 5' 3" (1.6 m) and weight is 63 kg (138 lb 14.2 oz). Her oral temperature is 98.6 °F (37 °C). Her blood pressure is 126/85 and her pulse is 75. Her respiration is 18 and oxygen saturation is 98%.     Chief Complaint: Cough    Patient presents today with fever, cough that is productive of yellow sputum and congestion starting on Tuesday.   Patient tried hot tea supplements and had some relief.     53-year-old female presents to clinic with complaints of sneezing, nasal congestion, voice change, fever, yellow sputum, frequent throat clearing and sore throat x 2 days. Treating with hot tea supplements with some relief. She presented for a virtual visit today with complaints of cough diagnosed with viral URI with cough instructed to rest at home or present to urgent care for covid screening. Medication profile consists of Dymista nasal spray reports has not sued in a while.    Cough  This is a new problem. The current episode started in the past 7 days. The problem has been unchanged. The problem occurs constantly. The cough is Non-productive. Associated symptoms include ear congestion, headaches, nasal congestion, postnasal drip and a sore throat. Pertinent negatives include no chills, fever or wheezing. She has tried nothing for the symptoms. The treatment provided no relief.       Constitution: Negative for chills, sweating, fatigue and fever.   HENT:  Positive for congestion, postnasal drip and sore throat.    Respiratory:  Positive for chest tightness and cough. Negative for stridor, wheezing and asthma.    Gastrointestinal:  Negative for abdominal pain, nausea and vomiting.   Skin:  Negative for erythema.   Allergic/Immunologic: Positive for seasonal allergies. Negative for asthma.   Neurological:  Positive for headaches.      Objective:     Physical Exam   Constitutional: She is oriented to person, place, and time. She appears " well-developed.   HENT:   Head: Normocephalic and atraumatic. Head is without abrasion, without contusion and without laceration.   Ears:   Right Ear: External ear normal. Tympanic membrane is bulging.   Left Ear: External ear normal. Tympanic membrane is bulging.   Nose: Mucosal edema and rhinorrhea present.   Mouth/Throat: Oropharynx is clear and moist and mucous membranes are normal.   Eyes: Conjunctivae, EOM and lids are normal. Pupils are equal, round, and reactive to light.   Neck: Trachea normal and phonation normal. Neck supple.   Cardiovascular: Normal rate, regular rhythm and normal heart sounds.   Pulmonary/Chest: Effort normal and breath sounds normal. No stridor. No respiratory distress.   Scattered crackles via bronchial region with cough         Comments: Scattered crackles via bronchial region with cough    Musculoskeletal: Normal range of motion.         General: Normal range of motion.   Neurological: She is alert and oriented to person, place, and time.   Skin: Skin is warm, dry, intact and no rash. Capillary refill takes less than 2 seconds. No abrasion, No burn, No bruising, No erythema and No ecchymosis   Psychiatric: Her speech is normal and behavior is normal. Judgment and thought content normal.   Nursing note and vitals reviewed.      Assessment:     1. Viral URI with cough    2. Post-nasal drip    3. Rhinitis, unspecified type    4. Nasal congestion      Results for orders placed or performed in visit on 09/14/23   SARS Coronavirus 2 Antigen, POCT Manual Read   Result Value Ref Range    SARS Coronavirus 2 Antigen Negative Negative     Acceptable Yes         Plan:       Viral URI with cough  -     SARS Coronavirus 2 Antigen, POCT Manual Read  -     benzonatate (TESSALON) 100 MG capsule; Take 1 capsule (100 mg total) by mouth 3 (three) times daily as needed for Cough.  Dispense: 30 capsule; Refill: 0    Post-nasal drip    Rhinitis, unspecified type  -     azelastine (ASTELIN)  137 mcg (0.1 %) nasal spray; 1 spray (137 mcg total) by Nasal route 2 (two) times daily as needed for Rhinitis.  Dispense: 30 mL; Refill: 0  -     cetirizine (ZYRTEC) 10 MG tablet; Take 1 tablet (10 mg total) by mouth daily as needed for Rhinitis.  Dispense: 30 tablet; Refill: 0    Nasal congestion  -     fluticasone propionate (FLONASE) 50 mcg/actuation nasal spray; 2 sprays (100 mcg total) by Each Nostril route daily as needed for Rhinitis (nasal congestion).  Dispense: 15.8 mL; Refill: 0  -     azelastine (ASTELIN) 137 mcg (0.1 %) nasal spray; 1 spray (137 mcg total) by Nasal route 2 (two) times daily as needed for Rhinitis.  Dispense: 30 mL; Refill: 0      Decline screening for influenza            Patient Instructions   Please drink plenty of fluids.  Please get plenty of rest.  Please return here or go to the Emergency Department for any concerns or worsening of condition.  It is ok to take over the counter plain Allegra or Claritin or Zyrtec.   We recommend you take Flonase (Fluticasone) or another nasally inhaled steroid unless you are already taking one.  Nasal irrigation with a saline spray or Netti Pot like device per their directions is also recommended.  If not allergic, please take over the counter Tylenol (Acetaminophen) and/or Motrin (Ibuprofen) as directed for control of pain and/or fever.  Please follow up with your primary care doctor or specialist as needed.    If you  smoke, please stop smoking.

## 2023-09-14 NOTE — PROGRESS NOTES
Subjective:      Patient ID: Yanelis Lundy is a 53 y.o. female.    Vitals:  vitals were not taken for this visit.     Chief Complaint: URI (/)      Visit Type: TELE AUDIOVISUAL    Present with the patient at the time of consultation: TELEMED PRESENT WITH PATIENT: None    Past Medical History:   Diagnosis Date    Anemia     Brain injury     PTSD (post-traumatic stress disorder)      Past Surgical History:   Procedure Laterality Date    APPENDECTOMY      HYSTERECTOMY       Review of patient's allergies indicates:   Allergen Reactions    Blue dye     Penicillins      Current Outpatient Medications on File Prior to Visit   Medication Sig Dispense Refill    ALPRAZolam (XANAX) 0.25 MG tablet Take 0.5 mg by mouth 2 (two) times daily.      buPROPion (WELLBUTRIN XL) 300 MG 24 hr tablet Take 1 tablet (300 mg total) by mouth once daily. 90 tablet 0    DIVIGEL 0.75 mg/0.75 gram (0.1%) GlPk Place onto the skin.      DULoxetine (CYMBALTA) 60 MG capsule Take 1 capsule (60 mg total) by mouth once daily. 90 capsule 0    estradioL (DIVIGEL) 1 mg/gram (0.1 %) topical gel Place onto the skin once daily.      estradioL (ESTRACE) 0.01 % (0.1 mg/gram) vaginal cream as needed.      estradioL (VIVELLE-DOT) 0.1 mg/24 hr PTSW Apply 4 patches every week by transderm. route for 28 days.      linaCLOtide (LINZESS) 290 mcg Cap capsule QD      progesterone micronized (ENDOMETRIN) 100 mg vaginal insert progesterone   QD      testosterone (FORTESTA) 10 mg/0.5 gram /actuation GlPm Fortesta   EXTREMELY SMALL AMOUNT, SIZE OF A BURTON QD      testosterone (TESTIM) 50 mg/5 gram (1 %) Gel Apply topically once daily.       No current facility-administered medications on file prior to visit.     History reviewed. No pertinent family history.        Ohs Peq Odvv Intake    9/14/2023  7:10 AM CDT - Filed by Patient   Describe your reason for todays visit Covid/upper respiratory infection   What is your current physical address in the event of a medical  "emergency? [address] [place] [address]   Are you able to take your vital signs? No   Please attach any relevant images or files          Patient reports visiting from [facility], exposure to covid, onset of sx 48 hours ago to include nasal congestion, coughing, sneezing, hoarseness. Temp max 99. Administered a home test but results are unclear , reports it looks "mushy" .         Constitution: Negative for chills, sweating, fatigue and fever.   HENT:  Positive for congestion, sore throat and voice change. Negative for ear pain, ear discharge, tinnitus, hearing loss, sinus pain, sinus pressure and trouble swallowing.    Neck: Negative for neck pain and painful lymph nodes.   Cardiovascular:  Negative for chest pain, palpitations and sob on exertion.   Respiratory:  Negative for cough, sputum production, shortness of breath and wheezing.    Gastrointestinal:  Negative for abdominal pain, nausea, vomiting and diarrhea.   Musculoskeletal:  Negative for muscle ache.   Skin:  Negative for color change, pale and rash.   Allergic/Immunologic: Positive for sneezing.   Neurological:  Negative for headaches, numbness and tingling.   Hematologic/Lymphatic: Negative for swollen lymph nodes.        Objective:   The physical exam was conducted virtually.  Physical Exam   Abdominal: Normal appearance.   Neurological: She is alert.       Assessment:     1. Viral URI with cough        Plan:       Viral URI with cough          Medical Decision Making:   Initial Assessment:   Likely viral URI, advised to retest at home for covid or may visit urgent for screening          "

## 2023-10-12 DIAGNOSIS — F41.1 GENERALIZED ANXIETY DISORDER: ICD-10-CM

## 2023-10-12 DIAGNOSIS — F33.1 MODERATE EPISODE OF RECURRENT MAJOR DEPRESSIVE DISORDER: ICD-10-CM

## 2023-10-12 RX ORDER — BUPROPION HYDROCHLORIDE 300 MG/1
300 TABLET ORAL DAILY
Qty: 90 TABLET | Refills: 0 | Status: SHIPPED | OUTPATIENT
Start: 2023-10-12 | End: 2023-11-07 | Stop reason: SDUPTHER

## 2023-10-12 RX ORDER — DULOXETIN HYDROCHLORIDE 60 MG/1
60 CAPSULE, DELAYED RELEASE ORAL DAILY
Qty: 90 CAPSULE | Refills: 0 | Status: SHIPPED | OUTPATIENT
Start: 2023-10-12 | End: 2023-11-07 | Stop reason: SDUPTHER

## 2023-10-30 ENCOUNTER — PATIENT MESSAGE (OUTPATIENT)
Dept: OPTOMETRY | Facility: CLINIC | Age: 53
End: 2023-10-30
Payer: COMMERCIAL

## 2023-11-06 ENCOUNTER — PATIENT MESSAGE (OUTPATIENT)
Dept: NEUROLOGY | Facility: CLINIC | Age: 53
End: 2023-11-06
Payer: COMMERCIAL

## 2023-11-06 ENCOUNTER — TELEPHONE (OUTPATIENT)
Dept: NEUROLOGY | Facility: CLINIC | Age: 53
End: 2023-11-06
Payer: COMMERCIAL

## 2023-11-06 NOTE — TELEPHONE ENCOUNTER
----- Message from Mariam Norton sent at 11/6/2023 10:41 AM CST -----  Regarding: Return Call From Eliu Needed  Contact: [phone]  Pt requesting a call back from Eliu regarding a missed call received (appt rescheduling).

## 2023-11-06 NOTE — TELEPHONE ENCOUNTER
Spoke with patient and informed patient that next available appt won't be until February 2024. Patient decided to keep appt

## 2023-11-07 ENCOUNTER — OFFICE VISIT (OUTPATIENT)
Dept: PSYCHIATRY | Facility: CLINIC | Age: 53
End: 2023-11-07
Payer: COMMERCIAL

## 2023-11-07 DIAGNOSIS — F33.1 MODERATE EPISODE OF RECURRENT MAJOR DEPRESSIVE DISORDER: ICD-10-CM

## 2023-11-07 DIAGNOSIS — F41.1 GENERALIZED ANXIETY DISORDER: ICD-10-CM

## 2023-11-07 DIAGNOSIS — F07.81 POST CONCUSSION SYNDROME: ICD-10-CM

## 2023-11-07 DIAGNOSIS — F33.41 RECURRENT MAJOR DEPRESSIVE DISORDER, IN PARTIAL REMISSION: Primary | ICD-10-CM

## 2023-11-07 PROCEDURE — 1160F PR REVIEW ALL MEDS BY PRESCRIBER/CLIN PHARMACIST DOCUMENTED: ICD-10-PCS | Mod: CPTII,95,, | Performed by: STUDENT IN AN ORGANIZED HEALTH CARE EDUCATION/TRAINING PROGRAM

## 2023-11-07 PROCEDURE — 1159F PR MEDICATION LIST DOCUMENTED IN MEDICAL RECORD: ICD-10-PCS | Mod: CPTII,95,, | Performed by: STUDENT IN AN ORGANIZED HEALTH CARE EDUCATION/TRAINING PROGRAM

## 2023-11-07 PROCEDURE — 1160F RVW MEDS BY RX/DR IN RCRD: CPT | Mod: CPTII,95,, | Performed by: STUDENT IN AN ORGANIZED HEALTH CARE EDUCATION/TRAINING PROGRAM

## 2023-11-07 PROCEDURE — 99214 OFFICE O/P EST MOD 30 MIN: CPT | Mod: 95,,, | Performed by: STUDENT IN AN ORGANIZED HEALTH CARE EDUCATION/TRAINING PROGRAM

## 2023-11-07 PROCEDURE — 99214 PR OFFICE/OUTPT VISIT, EST, LEVL IV, 30-39 MIN: ICD-10-PCS | Mod: 95,,, | Performed by: STUDENT IN AN ORGANIZED HEALTH CARE EDUCATION/TRAINING PROGRAM

## 2023-11-07 PROCEDURE — 1159F MED LIST DOCD IN RCRD: CPT | Mod: CPTII,95,, | Performed by: STUDENT IN AN ORGANIZED HEALTH CARE EDUCATION/TRAINING PROGRAM

## 2023-11-07 RX ORDER — ALPRAZOLAM 0.5 MG/1
0.5 TABLET ORAL 2 TIMES DAILY
Qty: 60 TABLET | Refills: 5 | Status: SHIPPED | OUTPATIENT
Start: 2023-11-07 | End: 2024-05-05

## 2023-11-07 RX ORDER — BUPROPION HYDROCHLORIDE 300 MG/1
300 TABLET ORAL DAILY
Qty: 90 TABLET | Refills: 1 | Status: SHIPPED | OUTPATIENT
Start: 2023-11-07 | End: 2024-05-05

## 2023-11-07 RX ORDER — DULOXETIN HYDROCHLORIDE 60 MG/1
60 CAPSULE, DELAYED RELEASE ORAL DAILY
Qty: 90 CAPSULE | Refills: 1 | Status: SHIPPED | OUTPATIENT
Start: 2023-11-07 | End: 2024-05-05
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
## 2023-11-08 ENCOUNTER — PATIENT MESSAGE (OUTPATIENT)
Dept: NEUROLOGY | Facility: CLINIC | Age: 53
End: 2023-11-08
Payer: COMMERCIAL

## 2023-11-08 NOTE — TELEPHONE ENCOUNTER
Spoke with patient and pt rescheduled appt to 2/15/24. Patient wanted to discuss possible food allergy that may be triggering headaches/migraines and speech. Informed patient that an appointment is required to further discuss speech and resume speech therapy and reach out to PCP/Endocrine/nutritionist in regards to food allergy for the time being until seen by MD.

## 2023-11-09 ENCOUNTER — PATIENT MESSAGE (OUTPATIENT)
Dept: ORTHOPEDICS | Facility: CLINIC | Age: 53
End: 2023-11-09
Payer: COMMERCIAL

## 2023-11-09 DIAGNOSIS — M79.642 LEFT HAND PAIN: Primary | ICD-10-CM

## 2023-11-10 ENCOUNTER — OFFICE VISIT (OUTPATIENT)
Dept: ORTHOPEDICS | Facility: CLINIC | Age: 53
End: 2023-11-10
Payer: COMMERCIAL

## 2023-11-10 ENCOUNTER — HOSPITAL ENCOUNTER (OUTPATIENT)
Dept: RADIOLOGY | Facility: HOSPITAL | Age: 53
Discharge: HOME | End: 2023-11-10
Attending: PHYSICIAN ASSISTANT
Payer: COMMERCIAL

## 2023-11-10 DIAGNOSIS — M18.12 ARTHRITIS OF CARPOMETACARPAL (CMC) JOINT OF LEFT THUMB: Primary | ICD-10-CM

## 2023-11-10 DIAGNOSIS — M79.642 LEFT HAND PAIN: ICD-10-CM

## 2023-11-10 PROCEDURE — 99203 PR OFFICE/OUTPT VISIT, NEW, LEVL III, 30-44 MIN: ICD-10-PCS | Mod: S$GLB,,, | Performed by: PHYSICIAN ASSISTANT

## 2023-11-10 PROCEDURE — 99999 PR PBB SHADOW E&M-EST. PATIENT-LVL III: CPT | Mod: PBBFAC,,, | Performed by: PHYSICIAN ASSISTANT

## 2023-11-10 PROCEDURE — 1159F PR MEDICATION LIST DOCUMENTED IN MEDICAL RECORD: ICD-10-PCS | Mod: CPTII,S$GLB,, | Performed by: PHYSICIAN ASSISTANT

## 2023-11-10 PROCEDURE — 73130 XR HAND COMPLETE 3 VIEW LEFT: ICD-10-PCS | Mod: 26,LT,, | Performed by: RADIOLOGY

## 2023-11-10 PROCEDURE — 73130 X-RAY EXAM OF HAND: CPT | Mod: 26,LT,, | Performed by: RADIOLOGY

## 2023-11-10 PROCEDURE — 99203 OFFICE O/P NEW LOW 30 MIN: CPT | Mod: S$GLB,,, | Performed by: PHYSICIAN ASSISTANT

## 2023-11-10 PROCEDURE — 73130 X-RAY EXAM OF HAND: CPT | Mod: TC,LT

## 2023-11-10 PROCEDURE — 99999 PR PBB SHADOW E&M-EST. PATIENT-LVL III: ICD-10-PCS | Mod: PBBFAC,,, | Performed by: PHYSICIAN ASSISTANT

## 2023-11-10 PROCEDURE — 1159F MED LIST DOCD IN RCRD: CPT | Mod: CPTII,S$GLB,, | Performed by: PHYSICIAN ASSISTANT

## 2023-11-10 NOTE — PROGRESS NOTES
[facility]  History & Physical  Orthopedics    SUBJECTIVE:      Chief Complaint: Left thumb pain    Referring Provider: Lillie Hester Dr. is the supervising physician for this encounter/patient    History of Present Illness:  Patient is a 53 y.o. right hand dominant female who presents today with complaints of left thumb pain. Pain present for about 2 weeks, no trauma/injury, but she does report increased activity at work. Pain present to the pad of the thumb, worse with gripping and twisting. She has not tried any treatments for this. No numbness/tingling.     The patient is a/an .    Onset of symptoms/DOI was 2 weeks.    Symptoms are aggravated by activity and movement.    Symptoms are alleviated by rest.    Symptoms consist of pain and swelling.    The patient rates their pain as a 8/10 with activity.    Attempted treatment(s) and/or interventions include activity modifications, rest.     The patient denies any fevers, chills, N/V, D/C and presents for evaluation.       Past Medical History:   Diagnosis Date    Anemia     Brain injury     PTSD (post-traumatic stress disorder)      Past Surgical History:   Procedure Laterality Date    APPENDECTOMY      HYSTERECTOMY       Review of patient's allergies indicates:   Allergen Reactions    Blue dye     Penicillins      Social History     Social History Narrative    Not on file     No family history on file.      Current Outpatient Medications:     ALPRAZolam (XANAX) 0.5 MG tablet, Take 1 tablet (0.5 mg total) by mouth 2 (two) times daily., Disp: 60 tablet, Rfl: 5    azelastine (ASTELIN) 137 mcg (0.1 %) nasal spray, 1 spray (137 mcg total) by Nasal route 2 (two) times daily as needed for Rhinitis., Disp: 30 mL, Rfl: 0    buPROPion (WELLBUTRIN XL) 300 MG 24 hr tablet, Take 1 tablet (300 mg total) by mouth once daily., Disp: 90 tablet, Rfl: 1    cetirizine (ZYRTEC) 10 MG tablet, Take 1 tablet (10 mg total) by mouth daily as  needed for Rhinitis., Disp: 30 tablet, Rfl: 0    DIVIGEL 0.75 mg/0.75 gram (0.1%) GlPk, Place onto the skin., Disp: , Rfl:     DULoxetine (CYMBALTA) 60 MG capsule, Take 1 capsule (60 mg total) by mouth once daily., Disp: 90 capsule, Rfl: 1    estradioL (DIVIGEL) 1 mg/gram (0.1 %) topical gel, Place onto the skin once daily., Disp: , Rfl:     estradioL (ESTRACE) 0.01 % (0.1 mg/gram) vaginal cream, as needed., Disp: , Rfl:     estradioL (VIVELLE-DOT) 0.1 mg/24 hr PTSW, Apply 4 patches every week by transderm. route for 28 days., Disp: , Rfl:     fluticasone propionate (FLONASE) 50 mcg/actuation nasal spray, 2 sprays (100 mcg total) by Each Nostril route daily as needed for Rhinitis (nasal congestion)., Disp: 15.8 mL, Rfl: 0    linaCLOtide (LINZESS) 290 mcg Cap capsule, QD, Disp: , Rfl:     progesterone micronized (ENDOMETRIN) 100 mg vaginal insert, progesterone  QD, Disp: , Rfl:     testosterone (FORTESTA) 10 mg/0.5 gram /actuation GlPm, Fortesta  EXTREMELY SMALL AMOUNT, SIZE OF A BURTON QD, Disp: , Rfl:     testosterone (TESTIM) 50 mg/5 gram (1 %) Gel, Apply topically once daily., Disp: , Rfl:       Review of Systems:  Constitutional: no fever or chills  Eyes: no visual changes  ENT: no nasal congestion or sore throat  Respiratory: no cough or shortness of breath  Cardiovascular: no chest pain  Gastrointestinal: no nausea or vomiting, tolerating diet  Musculoskeletal: pain and soreness    OBJECTIVE:      Vital Signs (Most Recent):  There were no vitals filed for this visit.  There is no height or weight on file to calculate BMI.      Physical Exam:  Constitutional: The patient appears well-developed and well-nourished. No distress.   Skin: No lesions appreciated  Head: Normocephalic and atraumatic.   Nose: Nose normal.   Ears: No deformities seen  Eyes: Conjunctivae and EOM are normal.   Neck: No tracheal deviation present.   Cardiovascular: Normal rate and intact distal pulses.    Pulmonary/Chest: Effort normal. No  respiratory distress.   Abdominal: There is no guarding.   Neurological: The patient is alert.   Psychiatric: The patient has a normal mood and affect.     Left Hand/Wrist Examination:    Observation/Inspection:  Swelling  Mild thenar    Deformity  none  Discoloration  none     Scars   none    Atrophy  none    HAND/WRIST EXAMINATION:  Finkelstein's Test   CMC pain  WHAT Test    Neg  Snuff box tenderness   Neg  Vasquez's Test    Neg  Hook of Hamate Tenderness  Neg  CMC grind    Neg  Circumduction test   Pos  TTP to the CMC    Neurovascular Exam:  Digits WWP, brisk CR < 3s throughout  NVI motor/LTS to M/R/U nerves, radial pulse 2+  Tinel's Test - Carpal Tunnel  Neg  Tinel's Test - Cubital Tunnel  Neg  Phalen's Test    Neg  Median Nerve Compression Test Neg    ROM hand full, painless    ROM wrist full, painless    ROM elbow full, painless    Abdomen not guarded  Respirations nonlabored  Perfusion intact    Diagnostic Results:     Imaging - I independently viewed the patient's imaging as well as the radiology report.  Xrays of the patient's left hand  demonstrate no evidence of any acute fractures or dislocations, positive for degenerative joint of the first cmc.    EMG - none    ASSESSMENT/PLAN:      53 y.o. yo female with Left cmc arthritis    Plan: The patient and I had a thorough discussion today.  We discussed the working diagnosis as well as several other potential alternative diagnoses.  Treatment options were discussed, both conservative and surgical.  Conservative treatment options would include things such as activity modifications, workplace modifications, a period of rest, oral vs topical OTC and prescription anti-inflammatory medications, occupational therapy, splinting/bracing, immobilization, corticosteroid injections, and others.  Surgical options were discussed as well.     At this time, the patient would like to proceed with a trial of thumb support brace, NSAIDs/voltaren gel massage, ice/heat. RTC on  prn basis.    Should the patient's symptoms worsen, persist, or fail to improve they should return for reevaluation and I would be happy to see them back anytime.           Please do not hesitate to reach out to us via email, phone, or MyChart with any questions, concerns, or feedback.

## 2023-11-16 ENCOUNTER — OFFICE VISIT (OUTPATIENT)
Dept: FAMILY MEDICINE | Facility: CLINIC | Age: 53
End: 2023-11-16
Payer: COMMERCIAL

## 2023-11-16 VITALS
DIASTOLIC BLOOD PRESSURE: 76 MMHG | TEMPERATURE: 99 F | BODY MASS INDEX: 25.17 KG/M2 | HEART RATE: 80 BPM | SYSTOLIC BLOOD PRESSURE: 92 MMHG | WEIGHT: 142.06 LBS | OXYGEN SATURATION: 98 % | HEIGHT: 63 IN

## 2023-11-16 DIAGNOSIS — E55.9 HYPOVITAMINOSIS D: ICD-10-CM

## 2023-11-16 DIAGNOSIS — Z00.00 ENCOUNTER FOR MEDICAL EXAMINATION TO ESTABLISH CARE: Primary | ICD-10-CM

## 2023-11-16 DIAGNOSIS — Z23 NEED FOR INFLUENZA VACCINATION: ICD-10-CM

## 2023-11-16 DIAGNOSIS — Z79.890 HORMONE REPLACEMENT THERAPY: ICD-10-CM

## 2023-11-16 DIAGNOSIS — Z23 NEED FOR SHINGLES VACCINE: ICD-10-CM

## 2023-11-16 DIAGNOSIS — Z00.00 ANNUAL PHYSICAL EXAM: ICD-10-CM

## 2023-11-16 DIAGNOSIS — Z90.710 S/P TOTAL HYSTERECTOMY: ICD-10-CM

## 2023-11-16 DIAGNOSIS — Z12.31 ENCOUNTER FOR SCREENING MAMMOGRAM FOR MALIGNANT NEOPLASM OF BREAST: ICD-10-CM

## 2023-11-16 DIAGNOSIS — J30.9 ALLERGIC RHINITIS, UNSPECIFIED SEASONALITY, UNSPECIFIED TRIGGER: ICD-10-CM

## 2023-11-16 PROCEDURE — 3074F SYST BP LT 130 MM HG: CPT | Mod: CPTII,S$GLB,, | Performed by: FAMILY MEDICINE

## 2023-11-16 PROCEDURE — 90471 FLU VACCINE (QUAD) GREATER THAN OR EQUAL TO 3YO PRESERVATIVE FREE IM: ICD-10-PCS | Mod: S$GLB,,, | Performed by: FAMILY MEDICINE

## 2023-11-16 PROCEDURE — 90472 IMMUNIZATION ADMIN EACH ADD: CPT | Mod: S$GLB,,, | Performed by: FAMILY MEDICINE

## 2023-11-16 PROCEDURE — 3078F DIAST BP <80 MM HG: CPT | Mod: CPTII,S$GLB,, | Performed by: FAMILY MEDICINE

## 2023-11-16 PROCEDURE — 1160F RVW MEDS BY RX/DR IN RCRD: CPT | Mod: CPTII,S$GLB,, | Performed by: FAMILY MEDICINE

## 2023-11-16 PROCEDURE — 90686 IIV4 VACC NO PRSV 0.5 ML IM: CPT | Mod: S$GLB,,, | Performed by: FAMILY MEDICINE

## 2023-11-16 PROCEDURE — 90471 IMMUNIZATION ADMIN: CPT | Mod: S$GLB,,, | Performed by: FAMILY MEDICINE

## 2023-11-16 PROCEDURE — 3074F PR MOST RECENT SYSTOLIC BLOOD PRESSURE < 130 MM HG: ICD-10-PCS | Mod: CPTII,S$GLB,, | Performed by: FAMILY MEDICINE

## 2023-11-16 PROCEDURE — 90750 ZOSTER RECOMBINANT VACCINE: ICD-10-PCS | Mod: S$GLB,,, | Performed by: FAMILY MEDICINE

## 2023-11-16 PROCEDURE — 3008F PR BODY MASS INDEX (BMI) DOCUMENTED: ICD-10-PCS | Mod: CPTII,S$GLB,, | Performed by: FAMILY MEDICINE

## 2023-11-16 PROCEDURE — 99386 PR PREVENTIVE VISIT,NEW,40-64: ICD-10-PCS | Mod: 25,S$GLB,, | Performed by: FAMILY MEDICINE

## 2023-11-16 PROCEDURE — 90472 ZOSTER RECOMBINANT VACCINE: ICD-10-PCS | Mod: S$GLB,,, | Performed by: FAMILY MEDICINE

## 2023-11-16 PROCEDURE — 1159F PR MEDICATION LIST DOCUMENTED IN MEDICAL RECORD: ICD-10-PCS | Mod: CPTII,S$GLB,, | Performed by: FAMILY MEDICINE

## 2023-11-16 PROCEDURE — 90686 FLU VACCINE (QUAD) GREATER THAN OR EQUAL TO 3YO PRESERVATIVE FREE IM: ICD-10-PCS | Mod: S$GLB,,, | Performed by: FAMILY MEDICINE

## 2023-11-16 PROCEDURE — 99999 PR PBB SHADOW E&M-EST. PATIENT-LVL V: ICD-10-PCS | Mod: PBBFAC,,, | Performed by: FAMILY MEDICINE

## 2023-11-16 PROCEDURE — 1160F PR REVIEW ALL MEDS BY PRESCRIBER/CLIN PHARMACIST DOCUMENTED: ICD-10-PCS | Mod: CPTII,S$GLB,, | Performed by: FAMILY MEDICINE

## 2023-11-16 PROCEDURE — 99999 PR PBB SHADOW E&M-EST. PATIENT-LVL V: CPT | Mod: PBBFAC,,, | Performed by: FAMILY MEDICINE

## 2023-11-16 PROCEDURE — 3008F BODY MASS INDEX DOCD: CPT | Mod: CPTII,S$GLB,, | Performed by: FAMILY MEDICINE

## 2023-11-16 PROCEDURE — 99386 PREV VISIT NEW AGE 40-64: CPT | Mod: 25,S$GLB,, | Performed by: FAMILY MEDICINE

## 2023-11-16 PROCEDURE — 1159F MED LIST DOCD IN RCRD: CPT | Mod: CPTII,S$GLB,, | Performed by: FAMILY MEDICINE

## 2023-11-16 PROCEDURE — 90750 HZV VACC RECOMBINANT IM: CPT | Mod: S$GLB,,, | Performed by: FAMILY MEDICINE

## 2023-11-16 PROCEDURE — 3078F PR MOST RECENT DIASTOLIC BLOOD PRESSURE < 80 MM HG: ICD-10-PCS | Mod: CPTII,S$GLB,, | Performed by: FAMILY MEDICINE

## 2023-11-16 RX ORDER — FLUTICASONE PROPIONATE 50 MCG
2 SPRAY, SUSPENSION (ML) NASAL DAILY PRN
Qty: 15.8 ML | Refills: 0 | Status: SHIPPED | OUTPATIENT
Start: 2023-11-16

## 2023-11-16 RX ORDER — AZELASTINE 1 MG/ML
1 SPRAY, METERED NASAL 2 TIMES DAILY PRN
Qty: 30 ML | Refills: 0 | Status: SHIPPED | OUTPATIENT
Start: 2023-11-16 | End: 2023-12-16

## 2023-11-16 RX ORDER — BENZONATATE 100 MG/1
100 CAPSULE ORAL 3 TIMES DAILY PRN
Qty: 30 CAPSULE | Refills: 0 | Status: SHIPPED | OUTPATIENT
Start: 2023-11-16 | End: 2023-11-26

## 2023-11-16 RX ORDER — CETIRIZINE HYDROCHLORIDE 10 MG/1
10 TABLET ORAL DAILY PRN
Qty: 30 TABLET | Refills: 0 | Status: SHIPPED | OUTPATIENT
Start: 2023-11-16

## 2023-11-16 NOTE — PROGRESS NOTES
Assessment & Plan:    Encounter for medical examination to establish care    Annual physical exam  -     CBC Auto Differential; Future; Expected date: 11/16/2023  -     Comprehensive Metabolic Panel; Future; Expected date: 11/16/2023  -     Hemoglobin A1C; Future; Expected date: 11/16/2023  -     Lipid Panel; Future; Expected date: 11/16/2023  -     Hepatitis C Antibody; Future; Expected date: 11/16/2023  -     HIV 1/2 Ag/Ab (4th Gen); Future; Expected date: 11/16/2023  -     TSH; Future; Expected date: 11/16/2023  -     Vitamin D; Future; Expected date: 11/16/2023    Fasting labs to be scheduled.    Allergic rhinitis, unspecified seasonality, unspecified trigger  -     benzonatate (TESSALON) 100 MG capsule; Take 1 capsule (100 mg total) by mouth 3 (three) times daily as needed for Cough.  Dispense: 30 capsule; Refill: 0  -     azelastine (ASTELIN) 137 mcg (0.1 %) nasal spray; 1 spray (137 mcg total) by Nasal route 2 (two) times daily as needed for Rhinitis.  Dispense: 30 mL; Refill: 0  -     fluticasone propionate (FLONASE) 50 mcg/actuation nasal spray; 2 sprays (100 mcg total) by Each Nostril route daily as needed for Rhinitis (nasal congestion).  Dispense: 15.8 mL; Refill: 0  -     cetirizine (ZYRTEC) 10 MG tablet; Take 1 tablet (10 mg total) by mouth daily as needed for Rhinitis.  Dispense: 30 tablet; Refill: 0    Controlled. Medication(s) refilled.     S/P total hysterectomy  Hormone replacement therapy  Patient takes HRT through an Endocrinologist. She plans to establish care with a new one and will send a message with the name of the provider through the portal so a new referral can be made.    Hypovitaminosis D  -     Vitamin D; Future; Expected date: 11/16/2023    Encounter for screening mammogram for malignant neoplasm of breast  -     Mammo Digital Screening Bilat; Future; Expected date: 11/16/2023    Need for shingles vaccine  -     (In Office Administered) Zoster Recombinant Vaccine    Need for  influenza vaccination  -     Influenza (FLUAD) - Quadrivalent (Adjuvanted) *Preferred* (65+) (PF)    Health maintenance reviewed & addressed above.   -Declines tetanus booster today      Follow-up: Follow up in about 2 months (around 1/16/2024) for nurse visit for 2nd shingles vaccine, 1 year for an annual.  ______________________________________________________________________    Chief Complaint  Chief Complaint   Patient presents with    Establish [facility]       HPI  Yanelis Lundy is a 53 y.o. female with medical diagnoses as listed in the medical history and problem list that presents to the office to establish care. She is in her usual state of health today.     Health Maintenance         Date Due Completion Date    Hepatitis C Screening Never done ---    Lipid Panel Never done ---    COVID-19 Vaccine (1) Never done ---    HIV Screening Never done ---    TETANUS VACCINE Never done ---    Mammogram Never done ---    Hemoglobin A1c (Diabetic Prevention Screening) Never done ---    Shingles Vaccine (1 of 2) Never done ---    Influenza Vaccine (1) Never done ---    Colorectal Cancer Screening 05/04/2024 5/4/2021              PAST MEDICAL HISTORY:  Past Medical History:   Diagnosis Date    Anemia     Brain injury     PTSD (post-traumatic stress disorder)     Vaginal cancer        PAST SURGICAL HISTORY:  Past Surgical History:   Procedure Laterality Date    APPENDECTOMY      HYSTERECTOMY         SOCIAL HISTORY:  Social History     Socioeconomic History    Marital status: Single   Tobacco Use    Smoking status: Never    Smokeless tobacco: Never   Substance and Sexual Activity    Alcohol use: Yes    Drug use: Never       FAMILY HISTORY:  History reviewed. No pertinent family history.    ALLERGIES AND MEDICATIONS: updated and reviewed.  Review of patient's allergies indicates:   Allergen Reactions    Chicken derived Dermatitis, Hives, Itching, Other (See Comments) and Swelling    Blue dye Other (See Comments)    Penicillins  Other (See Comments)     Current Outpatient Medications   Medication Sig Dispense Refill    ALPRAZolam (XANAX) 0.5 MG tablet Take 1 tablet (0.5 mg total) by mouth 2 (two) times daily. 60 tablet 5    buPROPion (WELLBUTRIN XL) 300 MG 24 hr tablet Take 1 tablet (300 mg total) by mouth once daily. 90 tablet 1    DIVIGEL 0.75 mg/0.75 gram (0.1%) GlPk Place onto the skin.      DULoxetine (CYMBALTA) 60 MG capsule Take 1 capsule (60 mg total) by mouth once daily. 90 capsule 1    estradioL (DIVIGEL) 1 mg/gram (0.1 %) topical gel Place onto the skin once daily.      estradioL (ESTRACE) 0.01 % (0.1 mg/gram) vaginal cream as needed.      estradioL (VIVELLE-DOT) 0.1 mg/24 hr PTSW Apply 4 patches every week by transderm. route for 28 days.      linaCLOtide (LINZESS) 290 mcg Cap capsule QD      progesterone micronized (ENDOMETRIN) 100 mg vaginal insert progesterone   QD      testosterone (FORTESTA) 10 mg/0.5 gram /actuation GlPm Fortesta   EXTREMELY SMALL AMOUNT, SIZE OF A BURTON QD      testosterone (TESTIM) 50 mg/5 gram (1 %) Gel Apply topically once daily.      azelastine (ASTELIN) 137 mcg (0.1 %) nasal spray 1 spray (137 mcg total) by Nasal route 2 (two) times daily as needed for Rhinitis. 30 mL 0    benzonatate (TESSALON) 100 MG capsule Take 1 capsule (100 mg total) by mouth 3 (three) times daily as needed for Cough. 30 capsule 0    cetirizine (ZYRTEC) 10 MG tablet Take 1 tablet (10 mg total) by mouth daily as needed for Rhinitis. 30 tablet 0    fluticasone propionate (FLONASE) 50 mcg/actuation nasal spray 2 sprays (100 mcg total) by Each Nostril route daily as needed for Rhinitis (nasal congestion). 15.8 mL 0     No current facility-administered medications for this visit.         ROS  Review of Systems   Constitutional:  Negative for activity change and unexpected weight change.   Neurological:  Positive for headaches (chronic).           Physical Exam  Vitals:    11/16/23 1459   BP: 92/76   BP Location: Right arm   Patient  "Position: Sitting   BP Method: Medium (Manual)   Pulse: 80   Temp: 98.6 °F (37 °C)   TempSrc: Oral   SpO2: 98%   Weight: 64.4 kg (142 lb 1.4 oz)   Height: 5' 3" (1.6 m)    Body mass index is 25.17 kg/m².  Weight: 64.4 kg (142 lb 1.4 oz)   Height: 5' 3" (160 cm)   Physical Exam  Constitutional:       General: She is not in acute distress.     Appearance: Normal appearance.   HENT:      Head: Normocephalic and atraumatic.   Neck:      Thyroid: No thyromegaly.      Vascular: No carotid bruit.   Cardiovascular:      Rate and Rhythm: Normal rate and regular rhythm.      Pulses: Normal pulses.      Heart sounds: Normal heart sounds.   Pulmonary:      Effort: Pulmonary effort is normal. No respiratory distress.      Breath sounds: Normal breath sounds.   Musculoskeletal:      Cervical back: Neck supple.      Right lower leg: No edema.      Left lower leg: No edema.   Lymphadenopathy:      Cervical: No cervical adenopathy.   Skin:     General: Skin is warm and dry.      Findings: No rash.   Neurological:      General: No focal deficit present.      Mental Status: She is alert and oriented to person, place, and time.   Psychiatric:         Mood and Affect: Mood normal.         Behavior: Behavior normal.         Thought Content: Thought content normal.             "

## 2023-11-17 ENCOUNTER — PATIENT MESSAGE (OUTPATIENT)
Dept: FAMILY MEDICINE | Facility: CLINIC | Age: 53
End: 2023-11-17
Payer: COMMERCIAL

## 2023-11-17 DIAGNOSIS — Z90.710 S/P TOTAL HYSTERECTOMY: Primary | ICD-10-CM

## 2023-11-17 RX ORDER — ESTRADIOL 0.75 MG/.75G
GEL TOPICAL
OUTPATIENT
Start: 2023-11-17

## 2023-11-17 RX ORDER — ESTRADIOL 1 MG/G
1.75 GEL TOPICAL DAILY
Qty: 52.5 G | Refills: 11 | Status: SHIPPED | OUTPATIENT
Start: 2023-11-17 | End: 2024-11-16

## 2023-11-17 NOTE — TELEPHONE ENCOUNTER
No care due was identified.  [facility] Embedded Care Due Messages. Reference number: [id number].   11/17/2023 11:15:32 AM CST

## 2023-12-01 ENCOUNTER — PATIENT MESSAGE (OUTPATIENT)
Dept: FAMILY MEDICINE | Facility: CLINIC | Age: 53
End: 2023-12-01
Payer: COMMERCIAL

## 2023-12-11 NOTE — PATIENT INSTRUCTIONS
Homework:   - keep convergence HEP the same   -saccades: completed for 1 min duration at 80 beats per minute (all directions)  -VOR: horizontal and vertical: 45 s - 100 bpm     Resource: [facility]    Left message for pt to call back.

## 2024-01-09 ENCOUNTER — PATIENT MESSAGE (OUTPATIENT)
Dept: FAMILY MEDICINE | Facility: CLINIC | Age: 54
End: 2024-01-09
Payer: COMMERCIAL

## 2024-01-10 ENCOUNTER — PATIENT MESSAGE (OUTPATIENT)
Dept: NEUROLOGY | Facility: CLINIC | Age: 54
End: 2024-01-10
Payer: COMMERCIAL

## 2024-02-01 ENCOUNTER — TELEPHONE (OUTPATIENT)
Dept: NEUROLOGY | Facility: CLINIC | Age: 54
End: 2024-02-01
Payer: COMMERCIAL

## 2024-02-15 ENCOUNTER — OFFICE VISIT (OUTPATIENT)
Dept: NEUROLOGY | Facility: CLINIC | Age: 54
End: 2024-02-15
Payer: COMMERCIAL

## 2024-02-15 DIAGNOSIS — F07.81 POST CONCUSSION SYNDROME: Primary | ICD-10-CM

## 2024-02-15 PROCEDURE — 96116 NUBHVL XM PHYS/QHP 1ST HR: CPT | Mod: 95,,, | Performed by: CLINICAL NEUROPSYCHOLOGIST

## 2024-02-15 PROCEDURE — 99499 UNLISTED E&M SERVICE: CPT | Mod: 95,,, | Performed by: CLINICAL NEUROPSYCHOLOGIST

## 2024-02-15 NOTE — PROGRESS NOTES
NEUROPSYCHOLOGY FOLLOW-UP APPOINTMENT  CONFIDENTIAL    Referring Provider: Jessica Ray, PhD, [facility]  Medical Necessity: Follow up on cognitive and emotional functioning, participate in treatment planning/management, and provide supportive therapy in the setting of post-concussion syndrome   Date Conducted: 2/15/2024  Present At Visit: the patient  Billin/35087 = 45 minutes  Referral Diagnoses: F07.81 (ICD-10-CM) - Post-concussion syndrome   Consent: The patient expressed an understanding of the purpose of the evaluation and consented to all procedures. We discussed the limits of confidentiality and discussed an emergency plan.    TELEMEDICINE DETAILS:  The patient location is: [place]  The chief complaint leading to consultation is: post-concussion syndrome  Visit type: Virtual visit with synchronous audio and video  Total time spent with patient: 45 minutes  Each patient to whom he or she provides medical services by telemedicine is: (1) informed of the relationship between the physician and patient and the respective role of any other health care provider with respect to management of the patient; and (2) notified that he or she may decline to receive medical services by telemedicine and may withdraw from such care at any time.  Notes: The patient consented to all procedures and an emergency plan was discussed in detail.     ASSESSMENT & PLAN   Ms. Yanelis Lundy is an 53 y.o., female with 17 years of education and pertinent medical history including multiple concussions, PTSD, and anxiety who returns for a check-in in the setting of post-concussion syndrome.       Interested in trying adaptogens and nootropics to help with cognitive and emotional functioning.   Interested in pursuing therapy with another provider and is looking for a referral.   Messaging her with the necessary information through [employer].   Encouraged to reach out again if I can help in any other way.    Problem List Items Addressed  "This Visit          Neuro    Post concussion syndrome - Primary     Thank you for allowing me to assist in Ms. Yanelis Lundy's care. If you have any questions, please contact me at [phone].      Jessica Ray, PhD, ABPP  Board Certified in Clinical Neuropsychology  [facility] - Department of Neurology    SUBJECTIVE     Tape on her glasses is helping.   8 months into the prescription work. Also got quiet when they   Doesn't like life without her glasses  The sensory issues are still pretty bad, but the visual ones have been the most problematic.  Headaches are way down since her brain is quieter, soy is a trigger so stopped eating that.   Triggers she is figuring out, so that is good.   For the most part getting dressed and putting makeup on for work. That's new over the past month.   Doing yoga, not enough though.   Gotten very interested in serotonin and dopamine   Hasn't found a therapist. No one can see me. Needs an outside referral. When she tried to got to the website it's overwhelming.     Questions she has for me:   Friend has an ADHD daughter. Cousin also has ADHD. Both think she needs to be tested for ADHD since they believe her cognitive trouble is lifelong.   Focus is very hard for her right now. So that's a hot button right now - focus. Trying to get better at it.   Trying really hard to stick with what she calls "patterning" which is sticking to routines each day.   Very leery of drinking alcohol right now.   Non-alcoholic sophia, adaptogens and nootropics >> what are my thoughts about these  Looking into a somatic yoga class.     OBJECTIVE     MENTAL STATUS AND OBSERVATIONS:   Appearance: Casually dressed and adequate grooming/hygiene.   Alertness: Attentive and alert.   Orientation:   O x 4    Gait:  Unable to assess   Psychomotor:  Unable to assess   Handedness:  Ambidextrous but writes with her right hand    Vision & Hearing:  Adequate for session. Wore glasses  " "  Speech/language: Normal in rate, rhythm, tone, and volume. No significant word finding difficulty observed. Comprehension was normal.   Mood/Affect:  The patients stated mood was "so much better than the last time you saw me." Affect was euthymic.    Interpersonal Behavior:  Rapport was quickly and easily established    Suicidality/Homicidality: Denied   Hallucinations/Delusions:  None evidenced or endorsed   Thought Content: Logical   Though Processes: Goal-directed   Insight & Judgment:  Appropriate   Participation in Interview:  Full     PROCEDURES/TESTS ADMINISTERED: Performed a review of pertinent medical records, reviewed limits to confidentiality, conducted a clinical interview, and explained procedures.    "

## 2024-02-18 ENCOUNTER — PATIENT MESSAGE (OUTPATIENT)
Dept: NEUROLOGY | Facility: CLINIC | Age: 54
End: 2024-02-18
Payer: COMMERCIAL

## 2024-02-22 ENCOUNTER — OFFICE VISIT (OUTPATIENT)
Dept: NEUROLOGY | Facility: CLINIC | Age: 54
End: 2024-02-22
Payer: COMMERCIAL

## 2024-02-22 ENCOUNTER — LAB VISIT (OUTPATIENT)
Dept: LAB | Facility: HOSPITAL | Age: 54
End: 2024-02-22
Attending: PSYCHIATRY & NEUROLOGY
Payer: COMMERCIAL

## 2024-02-22 VITALS
DIASTOLIC BLOOD PRESSURE: 72 MMHG | WEIGHT: 137 LBS | HEIGHT: 62 IN | BODY MASS INDEX: 25.21 KG/M2 | HEART RATE: 90 BPM | SYSTOLIC BLOOD PRESSURE: 124 MMHG

## 2024-02-22 DIAGNOSIS — S06.9XAS COGNITIVE AND NEUROBEHAVIORAL DYSFUNCTION FOLLOWING BRAIN INJURY: ICD-10-CM

## 2024-02-22 DIAGNOSIS — G31.89 COGNITIVE AND NEUROBEHAVIORAL DYSFUNCTION FOLLOWING BRAIN INJURY: ICD-10-CM

## 2024-02-22 DIAGNOSIS — Z85.41 HISTORY OF CERVICAL CANCER IN ADULTHOOD: ICD-10-CM

## 2024-02-22 DIAGNOSIS — F09 COGNITIVE AND NEUROBEHAVIORAL DYSFUNCTION FOLLOWING BRAIN INJURY: ICD-10-CM

## 2024-02-22 DIAGNOSIS — F43.10 PTSD (POST-TRAUMATIC STRESS DISORDER): ICD-10-CM

## 2024-02-22 DIAGNOSIS — H51.9 CONVERGENCE INSUFFICIENCY OR PALSY IN BINOCULAR EYE MOVEMENT: ICD-10-CM

## 2024-02-22 DIAGNOSIS — G43.709 CHRONIC MIGRAINE WITHOUT AURA WITHOUT STATUS MIGRAINOSUS, NOT INTRACTABLE: Primary | ICD-10-CM

## 2024-02-22 PROCEDURE — 99214 OFFICE O/P EST MOD 30 MIN: CPT | Mod: S$GLB,,, | Performed by: PSYCHIATRY & NEUROLOGY

## 2024-02-22 PROCEDURE — 3078F DIAST BP <80 MM HG: CPT | Mod: CPTII,S$GLB,, | Performed by: PSYCHIATRY & NEUROLOGY

## 2024-02-22 PROCEDURE — 3008F BODY MASS INDEX DOCD: CPT | Mod: CPTII,S$GLB,, | Performed by: PSYCHIATRY & NEUROLOGY

## 2024-02-22 PROCEDURE — 36415 COLL VENOUS BLD VENIPUNCTURE: CPT | Performed by: PSYCHIATRY & NEUROLOGY

## 2024-02-22 PROCEDURE — 86255 FLUORESCENT ANTIBODY SCREEN: CPT | Mod: 59 | Performed by: PSYCHIATRY & NEUROLOGY

## 2024-02-22 PROCEDURE — 1160F RVW MEDS BY RX/DR IN RCRD: CPT | Mod: CPTII,S$GLB,, | Performed by: PSYCHIATRY & NEUROLOGY

## 2024-02-22 PROCEDURE — 99999 PR PBB SHADOW E&M-EST. PATIENT-LVL IV: CPT | Mod: PBBFAC,,, | Performed by: PSYCHIATRY & NEUROLOGY

## 2024-02-22 PROCEDURE — 1159F MED LIST DOCD IN RCRD: CPT | Mod: CPTII,S$GLB,, | Performed by: PSYCHIATRY & NEUROLOGY

## 2024-02-22 PROCEDURE — 3074F SYST BP LT 130 MM HG: CPT | Mod: CPTII,S$GLB,, | Performed by: PSYCHIATRY & NEUROLOGY

## 2024-02-22 PROCEDURE — 86596 VOLTAGE-GTD CA CHNL ANTB EA: CPT | Performed by: PSYCHIATRY & NEUROLOGY

## 2024-02-22 NOTE — PATIENT INSTRUCTIONS
Find Cognitive Behavioral (CBT) Therapists and Psychologists in [place], [place] - Psychology Today     [email or web address]    Also do DBT and Trauma/PTSD and take BCBS    We have spoken about trying to help some of the english grammar issues by bolstering with a Romansh foreign language course/club or an janett
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
## 2024-02-27 LAB
AMPHIPHYSIN AB TITR SER: NEGATIVE {TITER}
ANNOTATION COMMENT IMP: NORMAL
CV2 IGG TITR SER: NEGATIVE {TITER}
GLIAL NUC TYPE 1 AB TITR SER: NEGATIVE {TITER}
HU1 AB TITR SER: NEGATIVE {TITER}
HU2 AB TITR SER IF: NEGATIVE {TITER}
HU3 AB TITR SER: NEGATIVE {TITER}
IMMUNOLOGIST REVIEW: NORMAL
PCA-1 AB TITR SER: NEGATIVE {TITER}
PCA-TR AB TITR SER: NEGATIVE {TITER}
PURKINJE CELL CYTOPLASMIC AB TYPE2: NEGATIVE
VGCC-P/Q BIND AB SER-SCNC: 0 NMOL/L
VGKC AB SER-SCNC: 0 NMOL/L

## 2024-05-09 ENCOUNTER — OFFICE VISIT (OUTPATIENT)
Dept: NEUROLOGY | Facility: CLINIC | Age: 54
End: 2024-05-09
Payer: COMMERCIAL

## 2024-05-09 ENCOUNTER — PATIENT MESSAGE (OUTPATIENT)
Dept: FAMILY MEDICINE | Facility: CLINIC | Age: 54
End: 2024-05-09
Payer: COMMERCIAL

## 2024-05-09 VITALS
HEART RATE: 76 BPM | SYSTOLIC BLOOD PRESSURE: 120 MMHG | HEIGHT: 63 IN | WEIGHT: 138 LBS | DIASTOLIC BLOOD PRESSURE: 76 MMHG | BODY MASS INDEX: 24.45 KG/M2

## 2024-05-09 DIAGNOSIS — G44.40 MEDICATION OVERUSE HEADACHE: Primary | ICD-10-CM

## 2024-05-09 DIAGNOSIS — R51.9 CHRONIC DAILY HEADACHE: ICD-10-CM

## 2024-05-09 DIAGNOSIS — F41.1 GENERALIZED ANXIETY DISORDER: ICD-10-CM

## 2024-05-09 DIAGNOSIS — F43.10 PTSD (POST-TRAUMATIC STRESS DISORDER): ICD-10-CM

## 2024-05-09 DIAGNOSIS — G43.709 CHRONIC MIGRAINE WITHOUT AURA WITHOUT STATUS MIGRAINOSUS, NOT INTRACTABLE: ICD-10-CM

## 2024-05-09 DIAGNOSIS — F33.1 MODERATE EPISODE OF RECURRENT MAJOR DEPRESSIVE DISORDER: ICD-10-CM

## 2024-05-09 PROCEDURE — 99215 OFFICE O/P EST HI 40 MIN: CPT | Mod: S$GLB,,, | Performed by: PSYCHIATRY & NEUROLOGY

## 2024-05-09 PROCEDURE — 1159F MED LIST DOCD IN RCRD: CPT | Mod: CPTII,S$GLB,, | Performed by: PSYCHIATRY & NEUROLOGY

## 2024-05-09 PROCEDURE — 3074F SYST BP LT 130 MM HG: CPT | Mod: CPTII,S$GLB,, | Performed by: PSYCHIATRY & NEUROLOGY

## 2024-05-09 PROCEDURE — 3008F BODY MASS INDEX DOCD: CPT | Mod: CPTII,S$GLB,, | Performed by: PSYCHIATRY & NEUROLOGY

## 2024-05-09 PROCEDURE — 99999 PR PBB SHADOW E&M-EST. PATIENT-LVL III: CPT | Mod: PBBFAC,,, | Performed by: PSYCHIATRY & NEUROLOGY

## 2024-05-09 PROCEDURE — 3078F DIAST BP <80 MM HG: CPT | Mod: CPTII,S$GLB,, | Performed by: PSYCHIATRY & NEUROLOGY

## 2024-05-09 PROCEDURE — 1160F RVW MEDS BY RX/DR IN RCRD: CPT | Mod: CPTII,S$GLB,, | Performed by: PSYCHIATRY & NEUROLOGY

## 2024-05-09 RX ORDER — DULOXETIN HYDROCHLORIDE 60 MG/1
60 CAPSULE, DELAYED RELEASE ORAL DAILY
Qty: 90 CAPSULE | Refills: 1 | Status: SHIPPED | OUTPATIENT
Start: 2024-05-09 | End: 2024-11-05

## 2024-05-09 NOTE — PROGRESS NOTES
"Subjective:       Patient ID: Yanelis Lundy is a 54 y.o. female.    Reason for Consult: Follow-up      Interval History:  Yanelis Lundy is here for follow up of migraines. Migraines are worsening overall.  Migraines don't always present in the same way: frontal, piercing through bilateral eyes, and all encompassing entire head aching.     Patient rates pain 8/10 takes tylenol 1g then goes down to 3/10. Reports 12/30 days of "bothersome" headaches but reports 28/30 headache days, last more than 8 hours at a time. + Photophobia, phonophobia, ringing in bilateral ears. Denies osmophobia.    History of visual changes "unable to describe; like a hand is in front of my face. I can see but I can't see". Wears prism glasses. No changes recently. Still complains of word finding or using "weird words", worse with fatigue or prolonged computer use. Previous formal neuropsych testing reviewed and interpreted with patient.     Was previously seeing psychiatrist but after he left [facility], unable to get new appointment d/t physicians "not accepting new patients"    Compliant with wellbutrin and cymbalta which pt reports "controls her headaches" and takes Tylenol 3x/week for severe headaches. Doing yoga and taking supplements (Thesis and Clarity) which pt reports are all somewhat helpful for migraines.    Patient's goal is to attend a full day of [employer] in the future.     Tried/Failed:  Tylenol   ibuprofen  Cymbalta/wellbutrin - currently taking for anxiety/depression; not improvement of HAs  Effexor      Objective:     Vitals:    05/09/24 1121   BP: 120/76   Pulse: 76     Pt wearing prism glasses, sitting with lights out in exam room d/t residual photophobia Bilateral trapezius tightness, tenderness on palpation. Limited cervical ROM bilateral rotation of neck. Patient is awake, alert and oriented to person, place and time. Moves all extremities antigravity. Cranial Nerves 2-12 without focal deficit. Gait and " station normal.     Focused examination was undertaken today. Most of the visit time was spent giving guidance, counseling and discussing treatment options.    Results for orders placed or performed during the hospital encounter of 01/28/23   MRI Brain Without Contrast    Narrative    EXAMINATION:  MRI BRAIN WITHOUT CONTRAST    CLINICAL HISTORY:  Traumatic brain injury (TBI), new or progressive neuro deficits; Unspecified injury of head, initial encounter    TECHNIQUE:  Multiplanar multisequence MR imaging of the brain was performed without contrast.    COMPARISON:  None.    FINDINGS:  There is no evidence of hydrocephalus advanced volume loss mass effect intracranial hemorrhage or acute infarct.  No extra-axial collection.    A single punctate focus of increased signal is nonspecific within the right sided periventricular white matter with the parenchyma otherwise maintaining normal signal intensity.  No hemosiderin deposition is identified.    Normal arterial flow voids are preserved at the skull base.    The visualized sinuses are clear with trace right mastoid mucosal thickening.      Impression    Essentially normal appearance of the brain.  No acute intracranial process.      Electronically signed by: Clifton Sanabria  Date:    01/28/2023  Time:    10:36       Assessment/Plan:   55yo F with presents alone to clinic for f/u of chronic migraines. Pt's chronic condition is complicated by medication overuse as pt having 28-30/30days, lasting more than 8 hours at a time of headaches and taking tylenol 3x/week for severe headaches with only moderate relief. Her quality of life is diminished by these headaches despite lifestyle modifications such as doing yoga, taking supplements, and limiting computer use/bright light exposure. Patient agreeable to start emgality. Discussed in detail proper administration and possible side effects. Does not want to do botox at this time due to cosmetic changes (she does not want all  wrinkles to disappear from forehead) but will consider in the future if monthly injectables are unsuccessful.  Pt taking wellbutrin, cymbalta, and xanex per psych but has been unable to find new psych MD at this time. Will refill cymbalta at this visit, and patient reports that her PCP agreed to rx wellbutrin and xanex until pt able to find new psychiatrist. Instructed patient to call again to try to schedule an appointment. Pt agreeable. Patient has seen neuropsych to rule out other causes for symptoms she is experiencing (visual changes, memory, word finding) and it appears to be functional, however, will monitor for new or worsening symptoms and refer as necessary. Will focus on decreasing headache pain at this time as this daily pain could be  exacerbating other symptoms.       Problem List Items Addressed This Visit          Neuro    Chronic migraine without aura without status migrainosus, not intractable    Overview     On Wellbutrin 300mg, Cymbalta 60mg from Psychiatry - will refill cymbalta at this time until pt finds new Psych MD  PCP to refill any other meds         Relevant Medications    galcanezumab-gnlm 120 mg/mL PnIj    galcanezumab-gnlm 120 mg/mL PnIj    Medication overuse headache - Primary       Psychiatric    Depression    Relevant Medications    DULoxetine (CYMBALTA) 60 MG capsule    PTSD (post-traumatic stress disorder)     Other Visit Diagnoses       Chronic daily headache        Generalized anxiety disorder        Relevant Medications    DULoxetine (CYMBALTA) 60 MG capsule                The patient verbalizes understanding and agreement with the treatment plan. I have discussed risks, benefits and alternatives to the treatment plan. Questions were sought and answered to her stated verbal satisfaction.          Freddy Lin MD, FAAN    This note is dictated on M*Modal Fluency Direct word recognition program. There are word recognition mistakes that are occasionally missed on review.    Based  on our encounter today, my overall Medical Decision Making is a Level 5 because of High = 1 or more chronic illnesses with severe exacerbation, progression, or side effects of treatment; 1 acute or chronic illness or injury that poses a threat to life or bodily function based on Number of Problems or Complexity of Problems

## 2024-05-10 RX ORDER — ALPRAZOLAM 0.5 MG/1
0.5 TABLET ORAL DAILY PRN
Qty: 30 TABLET | Refills: 0 | Status: SHIPPED | OUTPATIENT
Start: 2024-05-10

## 2024-06-04 ENCOUNTER — PATIENT MESSAGE (OUTPATIENT)
Dept: NEUROLOGY | Facility: CLINIC | Age: 54
End: 2024-06-04
Payer: COMMERCIAL

## 2024-06-05 NOTE — TELEPHONE ENCOUNTER
Spoke with patient and informed that reaction does not look severe. Patient does have bug bite reactions.    Informed patient that message will be relayed to MD for further review and advise.    Instructed patient to monitor reaction.

## 2024-07-05 ENCOUNTER — E-VISIT (OUTPATIENT)
Dept: FAMILY MEDICINE | Facility: CLINIC | Age: 54
End: 2024-07-05
Payer: COMMERCIAL

## 2024-07-05 ENCOUNTER — TELEPHONE (OUTPATIENT)
Dept: FAMILY MEDICINE | Facility: CLINIC | Age: 54
End: 2024-07-05

## 2024-07-05 ENCOUNTER — PATIENT MESSAGE (OUTPATIENT)
Dept: FAMILY MEDICINE | Facility: CLINIC | Age: 54
End: 2024-07-05

## 2024-07-05 DIAGNOSIS — Z90.710 S/P TOTAL HYSTERECTOMY: ICD-10-CM

## 2024-07-05 DIAGNOSIS — Z79.890 HORMONE REPLACEMENT THERAPY: Primary | ICD-10-CM

## 2024-07-05 RX ORDER — ESTRADIOL 1 MG/G
1.75 GEL TOPICAL DAILY
Qty: 52.5 G | Refills: 11 | Status: SHIPPED | OUTPATIENT
Start: 2024-07-05 | End: 2024-07-05 | Stop reason: SDUPTHER

## 2024-07-05 RX ORDER — ESTRADIOL 1 MG/G
1 GEL TOPICAL DAILY
Qty: 52.5 G | Refills: 11 | Status: SHIPPED | OUTPATIENT
Start: 2024-07-05 | End: 2025-07-05

## 2024-07-05 NOTE — TELEPHONE ENCOUNTER
----- Message from Beth Hoyos sent at 7/5/2024  3:59 PM CDT -----  Regarding: Medication Assistance  Contact: Dede  Type:  Pharmacy Calling to Clarify an RX    Name of Caller: Dede   Pharmacy Name: [facility]   Prescription Name: Estradiol   What do they need to clarify?: grams prescribed   Best Call Back Number: [phone]   Additional Information: Pharmacy stated they do not have that strength to prescribed as ordered please assist

## 2024-07-05 NOTE — PROGRESS NOTES
Patient ID: Yanelis Lundy is a 54 y.o. female.    Chief Complaint: Medication Management (Entered automatically based on patient selection in Patient Portal.)    The patient initiated a request through [employer] on 7/5/2024 for evaluation and management with a chief complaint of Medication Management (Entered automatically based on patient selection in Patient Portal.)     I evaluated the questionnaire submission on 7/5/24.    Ohs Peq Evisit Medication    7/5/2024  3:08 PM CDT - Filed by Patient   Do you agree to participate in an E-Visit? Yes   If you have any of the following symptoms, please present to your local emergency room or call 911:  I acknowledge   Medication requests for narcotics will not be addressed via an E-Visit.  Please schedule an appointment. I acknowledge   Do you want to address a new or existing medication? I would like to address a medication I currently take   What is the main issue you would like addressed today? Reissue a current RX to [facility] pharmacy per there request   Would you like to change or continue your medication? Continue medication   What medication would you like to continue?  Divigel   Are you taking it as prescribed? Yes    What medical condition is the  medication intended to treat? Hormone replacement therapy   Is the medication helping your condition? No   Are you having any side effects from the medication? Yes   What are the side effects? The current generic my body is not sufficiently absorbing   Provide any additional information you feel is important. I have an appointment for HRT with Gynecology 9/23/24   Please attach any relevant images or files    Are you able to take your vital signs? No         Encounter Diagnosis   Name Primary?    Hormone replacement therapy Yes        No orders of the defined types were placed in this encounter.       More information needed - see [facility] message.     Follow up if symptoms worsen or fail to  improve.      E-Visit Time Tracking:    Day 1 Time (in minutes): 5    Total Time (in minutes): 5

## 2024-07-30 ENCOUNTER — PATIENT MESSAGE (OUTPATIENT)
Dept: FAMILY MEDICINE | Facility: CLINIC | Age: 54
End: 2024-07-30
Payer: COMMERCIAL

## 2024-08-07 ENCOUNTER — OFFICE VISIT (OUTPATIENT)
Dept: FAMILY MEDICINE | Facility: CLINIC | Age: 54
End: 2024-08-07
Payer: COMMERCIAL

## 2024-08-07 VITALS
HEIGHT: 63 IN | OXYGEN SATURATION: 97 % | DIASTOLIC BLOOD PRESSURE: 74 MMHG | SYSTOLIC BLOOD PRESSURE: 116 MMHG | WEIGHT: 143.44 LBS | TEMPERATURE: 98 F | HEART RATE: 84 BPM | BODY MASS INDEX: 25.41 KG/M2

## 2024-08-07 DIAGNOSIS — Z23 NEED FOR DIPHTHERIA-TETANUS-PERTUSSIS (TDAP) VACCINE: ICD-10-CM

## 2024-08-07 DIAGNOSIS — T63.301A SPIDER BITE WOUND, ACCIDENTAL OR UNINTENTIONAL, INITIAL ENCOUNTER: Primary | ICD-10-CM

## 2024-08-07 DIAGNOSIS — F41.1 GENERALIZED ANXIETY DISORDER: ICD-10-CM

## 2024-08-07 PROCEDURE — 90471 IMMUNIZATION ADMIN: CPT | Mod: S$GLB,,,

## 2024-08-07 PROCEDURE — 1160F RVW MEDS BY RX/DR IN RCRD: CPT | Mod: CPTII,S$GLB,,

## 2024-08-07 PROCEDURE — 3078F DIAST BP <80 MM HG: CPT | Mod: CPTII,S$GLB,,

## 2024-08-07 PROCEDURE — 1159F MED LIST DOCD IN RCRD: CPT | Mod: CPTII,S$GLB,,

## 2024-08-07 PROCEDURE — 90715 TDAP VACCINE 7 YRS/> IM: CPT | Mod: S$GLB,,,

## 2024-08-07 PROCEDURE — 99213 OFFICE O/P EST LOW 20 MIN: CPT | Mod: 25,S$GLB,,

## 2024-08-07 PROCEDURE — 3008F BODY MASS INDEX DOCD: CPT | Mod: CPTII,S$GLB,,

## 2024-08-07 PROCEDURE — 99999 PR PBB SHADOW E&M-EST. PATIENT-LVL V: CPT | Mod: PBBFAC,,,

## 2024-08-07 PROCEDURE — 3074F SYST BP LT 130 MM HG: CPT | Mod: CPTII,S$GLB,,

## 2024-08-07 RX ORDER — SULFAMETHOXAZOLE AND TRIMETHOPRIM 800; 160 MG/1; MG/1
1 TABLET ORAL 2 TIMES DAILY
Qty: 10 TABLET | Refills: 0 | Status: SHIPPED | OUTPATIENT
Start: 2024-08-07 | End: 2024-08-12

## 2024-08-07 RX ORDER — TIZANIDINE 2 MG/1
2 TABLET ORAL EVERY 8 HOURS PRN
Qty: 10 TABLET | Refills: 0 | Status: SHIPPED | OUTPATIENT
Start: 2024-08-07 | End: 2024-08-17

## 2024-08-20 ENCOUNTER — OFFICE VISIT (OUTPATIENT)
Dept: FAMILY MEDICINE | Facility: CLINIC | Age: 54
End: 2024-08-20
Payer: COMMERCIAL

## 2024-08-20 DIAGNOSIS — Z79.890 ON HORMONE REPLACEMENT THERAPY: ICD-10-CM

## 2024-08-20 DIAGNOSIS — M50.30 BULGING OF CERVICAL INTERVERTEBRAL DISC: Primary | ICD-10-CM

## 2024-08-20 PROCEDURE — 99214 OFFICE O/P EST MOD 30 MIN: CPT | Mod: 95,,, | Performed by: FAMILY MEDICINE

## 2024-08-20 PROCEDURE — 1159F MED LIST DOCD IN RCRD: CPT | Mod: CPTII,95,, | Performed by: FAMILY MEDICINE

## 2024-08-20 PROCEDURE — 1160F RVW MEDS BY RX/DR IN RCRD: CPT | Mod: CPTII,95,, | Performed by: FAMILY MEDICINE

## 2024-08-20 NOTE — PROGRESS NOTES
Assessment & Plan:    Bulging of cervical intervertebral disc  -     Ambulatory referral/consult to Physical/Occupational Therapy; Future; Expected date: 08/27/2024    New referral to PT.    On hormone replacement therapy  Asked patient to send a picture of her prescription through her portal so that the correct progesterone is prescribed to [place].       The patient location is:  Patient Home   The chief complaint leading to consultation is: noted below  Visit type: Virtual visit with synchronous audio and video  Total time spent with patient: 10 minutes  Each patient to whom he or she provides medical services by telemedicine is:  (1) informed of the relationship between the physician and patient and the respective role of any other health care provider with respect to management of the patient; and (2) notified that he or she may decline to receive medical services by telemedicine and may withdraw from such care at any time.    Follow-up: Follow up if symptoms worsen or fail to improve.    ______________________________________________________________________    Chief Complaint  No chief complaint on file.      HPI  Yanelis Lundy is a 54 y.o. female with multiple medical diagnoses as listed in the medical history and problem list that presents in a virtual visit requesting a refill of her topical progesterone to her compounded pharmacy. Patient has been on HRT since the age of 34. She is scheduled to see a new Endocrinologist at [facility].     Patient has chronic neck pain and is interested in returning to PT.       PAST MEDICAL HISTORY:  Past Medical History:   Diagnosis Date    Anemia     Brain injury     PTSD (post-traumatic stress disorder)     Vaginal cancer        PAST SURGICAL HISTORY:  Past Surgical History:   Procedure Laterality Date    APPENDECTOMY      HYSTERECTOMY         SOCIAL HISTORY:  Social History     Socioeconomic History    Marital status: Single   Tobacco Use    Smoking  status: Never    Smokeless tobacco: Never   Substance and Sexual Activity    Alcohol use: Yes    Drug use: Never     Social Determinants of Health     Financial Resource Strain: Low Risk  (2/15/2024)    Overall Financial Resource Strain (CARDIA)     Difficulty of Paying Living Expenses: Not very hard   Food Insecurity: No Food Insecurity (2/15/2024)    Hunger Vital Sign     Worried About Running Out of Food in the Last Year: Never true     Ran Out of Food in the Last Year: Never true   Transportation Needs: No Transportation Needs (2/15/2024)    PRAPARE - Transportation     Lack of Transportation (Medical): No     Lack of Transportation (Non-Medical): No   Physical Activity: Insufficiently Active (2/15/2024)    Exercise Vital Sign     Days of Exercise per Week: 3 days     Minutes of Exercise per Session: 10 min   Stress: Stress Concern Present (2/15/2024)    [employer] [place] [employer] - Occupational Stress Questionnaire     Feeling of Stress : To some extent   Housing Stability: Low Risk  (2/15/2024)    Housing Stability Vital Sign     Unable to Pay for Housing in the Last Year: No     Number of Places Lived in the Last Year: 1     Unstable Housing in the Last Year: No       FAMILY HISTORY:  No family history on file.    ALLERGIES AND MEDICATIONS: updated and reviewed.  Review of patient's allergies indicates:   Allergen Reactions    Chicken derived Dermatitis, Hives, Itching, Other (See Comments) and Swelling    Blue dye Other (See Comments)    Penicillins Other (See Comments)     Current Outpatient Medications   Medication Sig Dispense Refill    ALPRAZolam (XANAX) 0.5 MG tablet Take 1 tablet (0.5 mg total) by mouth daily as needed for Anxiety. 30 tablet 0    azelastine (ASTELIN) 137 mcg (0.1 %) nasal spray 1 spray (137 mcg total) by Nasal route 2 (two) times daily as needed for Rhinitis. 30 mL 0    buPROPion (WELLBUTRIN XL) 300 MG 24 hr tablet Take 1 tablet (300 mg total) by mouth once daily. 90  tablet 1    cetirizine (ZYRTEC) 10 MG tablet Take 1 tablet (10 mg total) by mouth daily as needed for Rhinitis. 30 tablet 0    DULoxetine (CYMBALTA) 60 MG capsule Take 1 capsule (60 mg total) by mouth once daily. 90 capsule 1    estradioL (DIVIGEL) 1 mg/gram (0.1 %) topical gel Place 1 packet (1 gram) onto the skin every morning. 30 g 11    estradioL (DIVIGEL) 0.75 mg/0.75 gram (0.1%) GlPk Place 1 packet (0.75 gram) onto the skin every evening. 30 packet 11    fluticasone propionate (FLONASE) 50 mcg/actuation nasal spray 2 sprays (100 mcg total) by Each Nostril route daily as needed for Rhinitis (nasal congestion). 15.8 mL 0    galcanezumab-gnlm 120 mg/mL PnIj Inject 240 mg loading dose (administered as two consecutive injections of 120 mg each) 2 mL 0    galcanezumab-gnlm 120 mg/mL PnIj Inject 1 mL (120 mg total) into the skin every 28 days - Maintenance dose 1 mL 6    linaCLOtide (LINZESS) 290 mcg Cap capsule QD      testosterone (FORTESTA) 10 mg/0.5 gram /actuation GlPm Fortesta   EXTREMELY SMALL AMOUNT, SIZE OF A BURTON QD      testosterone (TESTIM) 50 mg/5 gram (1 %) Gel Apply topically once daily.       No current facility-administered medications for this visit.         ROS  Review of Systems   Constitutional:  Negative for activity change and unexpected weight change.   HENT:  Positive for rhinorrhea. Negative for hearing loss and trouble swallowing.    Eyes:  Negative for discharge and visual disturbance.   Respiratory:  Negative for chest tightness and wheezing.    Cardiovascular:  Negative for chest pain and palpitations.   Gastrointestinal:  Positive for constipation. Negative for blood in stool, diarrhea and vomiting.   Endocrine: Negative for polydipsia and polyuria.   Genitourinary:  Negative for difficulty urinating, dysuria, hematuria and menstrual problem.   Musculoskeletal:  Positive for arthralgias and neck pain. Negative for joint swelling.   Neurological:  Positive for headaches. Negative for  weakness.   Psychiatric/Behavioral:  Positive for confusion. Negative for dysphoric mood.            Physical Exam  Physical Exam    *Physical exam limited by virtual visit.    Health Maintenance         Date Due Completion Date    Hepatitis C Screening Never done ---    Lipid Panel Never done ---    HIV Screening Never done ---    Mammogram Never done ---    Hemoglobin A1c (Diabetic Prevention Screening) Never done ---    COVID-19 Vaccine (1 - 2023-24 season) Never done ---    Shingles Vaccine (2 of 2) 01/11/2024 11/16/2023    Colorectal Cancer Screening 05/04/2024 5/4/2021    Influenza Vaccine (1) 09/01/2024 11/16/2023    TETANUS VACCINE 08/07/2034 8/7/2024

## 2024-08-23 ENCOUNTER — PATIENT MESSAGE (OUTPATIENT)
Dept: OPTOMETRY | Facility: CLINIC | Age: 54
End: 2024-08-23
Payer: COMMERCIAL

## 2024-08-27 ENCOUNTER — PATIENT MESSAGE (OUTPATIENT)
Dept: OBSTETRICS AND GYNECOLOGY | Facility: CLINIC | Age: 54
End: 2024-08-27
Payer: COMMERCIAL

## 2024-08-30 ENCOUNTER — TELEPHONE (OUTPATIENT)
Dept: OPTOMETRY | Facility: CLINIC | Age: 54
End: 2024-08-30
Payer: COMMERCIAL

## 2024-08-30 ENCOUNTER — PATIENT MESSAGE (OUTPATIENT)
Dept: OPTOMETRY | Facility: CLINIC | Age: 54
End: 2024-08-30
Payer: COMMERCIAL

## 2024-09-03 ENCOUNTER — PATIENT MESSAGE (OUTPATIENT)
Dept: FAMILY MEDICINE | Facility: CLINIC | Age: 54
End: 2024-09-03
Payer: COMMERCIAL

## 2024-09-03 ENCOUNTER — OFFICE VISIT (OUTPATIENT)
Dept: OPTOMETRY | Facility: CLINIC | Age: 54
End: 2024-09-03
Payer: COMMERCIAL

## 2024-09-03 DIAGNOSIS — H52.31 ANISOMETROPIA AND ANISEIKONIA: ICD-10-CM

## 2024-09-03 DIAGNOSIS — H50.34 INTERMITTENT ALTERNATING EXOTROPIA: ICD-10-CM

## 2024-09-03 DIAGNOSIS — F41.1 GENERALIZED ANXIETY DISORDER: ICD-10-CM

## 2024-09-03 DIAGNOSIS — H52.32 ANISOMETROPIA AND ANISEIKONIA: ICD-10-CM

## 2024-09-03 DIAGNOSIS — H53.8 DYSFUNCTION OF VISUAL MOTION DETECTION: Primary | ICD-10-CM

## 2024-09-03 DIAGNOSIS — H53.143 PHOTOPHOBIA OF BOTH EYES: ICD-10-CM

## 2024-09-03 DIAGNOSIS — F07.81 POST CONCUSSION SYNDROME: ICD-10-CM

## 2024-09-03 DIAGNOSIS — F33.1 MODERATE EPISODE OF RECURRENT MAJOR DEPRESSIVE DISORDER: ICD-10-CM

## 2024-09-03 PROBLEM — Z78.9 GLUTEN FREE DIET: Status: ACTIVE | Noted: 2024-09-03

## 2024-09-03 PROCEDURE — 99999 PR PBB SHADOW E&M-EST. PATIENT-LVL II: CPT | Mod: PBBFAC,,, | Performed by: OPTOMETRIST

## 2024-09-03 RX ORDER — DULOXETIN HYDROCHLORIDE 60 MG/1
60 CAPSULE, DELAYED RELEASE ORAL DAILY
Qty: 90 CAPSULE | Refills: 1 | OUTPATIENT
Start: 2024-09-03 | End: 2025-03-02
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
## 2024-09-05 ENCOUNTER — OFFICE VISIT (OUTPATIENT)
Dept: FAMILY MEDICINE | Facility: CLINIC | Age: 54
End: 2024-09-05
Payer: COMMERCIAL

## 2024-09-05 DIAGNOSIS — M50.30 BULGING OF CERVICAL INTERVERTEBRAL DISC: Primary | ICD-10-CM

## 2024-09-05 DIAGNOSIS — F41.1 GENERALIZED ANXIETY DISORDER: ICD-10-CM

## 2024-09-05 DIAGNOSIS — F33.1 MODERATE EPISODE OF RECURRENT MAJOR DEPRESSIVE DISORDER: ICD-10-CM

## 2024-09-05 PROCEDURE — 1160F RVW MEDS BY RX/DR IN RCRD: CPT | Mod: CPTII,95,, | Performed by: FAMILY MEDICINE

## 2024-09-05 PROCEDURE — 99214 OFFICE O/P EST MOD 30 MIN: CPT | Mod: 95,,, | Performed by: FAMILY MEDICINE

## 2024-09-05 PROCEDURE — 1159F MED LIST DOCD IN RCRD: CPT | Mod: CPTII,95,, | Performed by: FAMILY MEDICINE

## 2024-09-05 RX ORDER — BUPROPION HYDROCHLORIDE 300 MG/1
300 TABLET ORAL DAILY
Qty: 90 TABLET | Refills: 3 | Status: SHIPPED | OUTPATIENT
Start: 2024-09-05 | End: 2025-09-05

## 2024-09-05 RX ORDER — DULOXETIN HYDROCHLORIDE 30 MG/1
30 CAPSULE, DELAYED RELEASE ORAL DAILY
Qty: 90 CAPSULE | Refills: 3 | Status: SHIPPED | OUTPATIENT
Start: 2024-09-05 | End: 2025-09-05

## 2024-09-05 RX ORDER — DULOXETIN HYDROCHLORIDE 60 MG/1
60 CAPSULE, DELAYED RELEASE ORAL DAILY
Qty: 90 CAPSULE | Refills: 3 | Status: SHIPPED | OUTPATIENT
Start: 2024-09-05 | End: 2025-09-05

## 2024-09-05 NOTE — PROGRESS NOTES
Assessment & Plan:    Bulging of cervical intervertebral disc  -     DULoxetine (CYMBALTA) 60 MG capsule; Take 1 capsule (60 mg total) by mouth once daily.  Dispense: 90 capsule; Refill: 3  -     DULoxetine (CYMBALTA) 30 MG capsule; Take 1 capsule (30 mg total) by mouth once daily.  Dispense: 90 capsule; Refill: 3    Increase Cymbalta to 90 mg daily.   May need referral to [facility] if no improvement in the neck pain after PT.     Generalized anxiety disorder  -     DULoxetine (CYMBALTA) 60 MG capsule; Take 1 capsule (60 mg total) by mouth once daily.  Dispense: 90 capsule; Refill: 3  -     buPROPion (WELLBUTRIN XL) 300 MG 24 hr tablet; Take 1 tablet (300 mg total) by mouth once daily.  Dispense: 90 tablet; Refill: 3    Moderate episode of recurrent major depressive disorder  -     DULoxetine (CYMBALTA) 60 MG capsule; Take 1 capsule (60 mg total) by mouth once daily.  Dispense: 90 capsule; Refill: 3  -     buPROPion (WELLBUTRIN XL) 300 MG 24 hr tablet; Take 1 tablet (300 mg total) by mouth once daily.  Dispense: 90 tablet; Refill: 3    Bupropion refilled.       The patient location is:  Patient Home   The chief complaint leading to consultation is: noted below  Visit type: Virtual visit with synchronous audio and video  Total time spent with patient: 10 minutes  Each patient to whom he or she provides medical services by telemedicine is:  (1) informed of the relationship between the physician and patient and the respective role of any other health care provider with respect to management of the patient; and (2) notified that he or she may decline to receive medical services by telemedicine and may withdraw from such care at any time.    Follow-up: Follow up if symptoms worsen or fail to improve.    ______________________________________________________________________    Chief Complaint  Chief Complaint   Patient presents with    Anxiety    Neck Pain       HPI  Yanelis Lundy is a 54 y.o. female with  multiple medical diagnoses as listed in the medical history and problem list that presents in a virtual visit to follow up on anxiety, depression, and chronic neck pain. Pt is known to me with her last appointment 8/20/2024. She has been having difficulty getting in to see a Psychiatrist but has an appointment next month. She is requesting refills of her Wellbutrin, which she takes for her mood, and Cymbalta, which she takes for chronic neck pain. She has been experiencing worsening neck pain and is planning to do PT.       PAST MEDICAL HISTORY:  Past Medical History:   Diagnosis Date    Anemia     Brain injury     PTSD (post-traumatic stress disorder)     Vaginal cancer        PAST SURGICAL HISTORY:  Past Surgical History:   Procedure Laterality Date    APPENDECTOMY      HYSTERECTOMY         SOCIAL HISTORY:  Social History     Socioeconomic History    Marital status: Single   Tobacco Use    Smoking status: Never    Smokeless tobacco: Never   Substance and Sexual Activity    Alcohol use: Yes    Drug use: Never     Social Determinants of Health     Financial Resource Strain: Low Risk  (2/15/2024)    Overall Financial Resource Strain (CARDIA)     Difficulty of Paying Living Expenses: Not very hard   Food Insecurity: No Food Insecurity (2/15/2024)    Hunger Vital Sign     Worried About Running Out of Food in the Last Year: Never true     Ran Out of Food in the Last Year: Never true   Transportation Needs: No Transportation Needs (2/15/2024)    PRAPARE - Transportation     Lack of Transportation (Medical): No     Lack of Transportation (Non-Medical): No   Physical Activity: Insufficiently Active (2/15/2024)    Exercise Vital Sign     Days of Exercise per Week: 3 days     Minutes of Exercise per Session: 10 min   Stress: Stress Concern Present (2/15/2024)    [employer] - Occupational Stress Questionnaire     Feeling of Stress : To some extent   Housing Stability: Low Risk  (2/15/2024)     Housing Stability Vital Sign     Unable to Pay for Housing in the Last Year: No     Number of Places Lived in the Last Year: 1     Unstable Housing in the Last Year: No       FAMILY HISTORY:  No family history on file.    ALLERGIES AND MEDICATIONS: updated and reviewed.  Review of patient's allergies indicates:   Allergen Reactions    Chicken derived Dermatitis, Hives, Itching, Other (See Comments) and Swelling    Blue dye Other (See Comments)    Penicillins Other (See Comments)     Current Outpatient Medications   Medication Sig Dispense Refill    azelastine (ASTELIN) 137 mcg (0.1 %) nasal spray 1 spray (137 mcg total) by Nasal route 2 (two) times daily as needed for Rhinitis. 30 mL 0    buPROPion (WELLBUTRIN XL) 300 MG 24 hr tablet Take 1 tablet (300 mg total) by mouth once daily. 90 tablet 3    cetirizine (ZYRTEC) 10 MG tablet Take 1 tablet (10 mg total) by mouth daily as needed for Rhinitis. 30 tablet 0    DULoxetine (CYMBALTA) 30 MG capsule Take 1 capsule (30 mg total) by mouth once daily. 90 capsule 3    DULoxetine (CYMBALTA) 60 MG capsule Take 1 capsule (60 mg total) by mouth once daily. 90 capsule 3    estradioL (DIVIGEL) 1 mg/gram (0.1 %) topical gel Place 1 packet (1 gram) onto the skin every morning. 30 g 11    estradioL (DIVIGEL) 0.75 mg/0.75 gram (0.1%) GlPk Place 1 packet (0.75 gram) onto the skin every evening. 30 packet 11    fluticasone propionate (FLONASE) 50 mcg/actuation nasal spray 2 sprays (100 mcg total) by Each Nostril route daily as needed for Rhinitis (nasal congestion). 15.8 mL 0    galcanezumab-gnlm 120 mg/mL PnIj Inject 240 mg loading dose (administered as two consecutive injections of 120 mg each) 2 mL 0    galcanezumab-gnlm 120 mg/mL PnIj Inject 1 mL (120 mg total) into the skin every 28 days - Maintenance dose 1 mL 6    linaCLOtide (LINZESS) 290 mcg Cap capsule QD      testosterone (FORTESTA) 10 mg/0.5 gram /actuation GlPm Fortesta   EXTREMELY SMALL AMOUNT, SIZE OF A BURTON QD       testosterone (TESTIM) 50 mg/5 gram (1 %) Gel Apply topically once daily.       No current facility-administered medications for this visit.         ROS  Review of Systems   Constitutional:  Positive for unexpected weight change. Negative for activity change.   HENT:  Negative for hearing loss, rhinorrhea and trouble swallowing.    Eyes:  Positive for visual disturbance. Negative for discharge.   Respiratory:  Negative for chest tightness and wheezing.    Cardiovascular:  Negative for chest pain and palpitations.   Gastrointestinal:  Negative for blood in stool, constipation, diarrhea and vomiting.   Endocrine: Negative for polydipsia and polyuria.   Genitourinary:  Negative for difficulty urinating, dysuria, hematuria and menstrual problem.   Musculoskeletal:  Positive for arthralgias, joint swelling and neck pain.   Neurological:  Positive for headaches. Negative for weakness.   Psychiatric/Behavioral:  Negative for confusion and dysphoric mood.            Physical Exam  Physical Exam  Constitutional:       General: She is not in acute distress.  HENT:      Head: Normocephalic and atraumatic.   Neurological:      Mental Status: She is alert. Mental status is at baseline.   Psychiatric:         Mood and Affect: Mood normal.         Behavior: Behavior normal.         *Physical exam limited by virtual visit.    Health Maintenance         Date Due Completion Date    Hepatitis C Screening Never done ---    Lipid Panel Never done ---    HIV Screening Never done ---    Mammogram Never done ---    Hemoglobin A1c (Diabetic Prevention Screening) Never done ---    Shingles Vaccine (2 of 2) 01/11/2024 11/16/2023    Colorectal Cancer Screening 05/04/2024 5/4/2021    Influenza Vaccine (1) 09/01/2024 11/16/2023    COVID-19 Vaccine (1 - 2023-24 season) Never done ---    TETANUS VACCINE 08/07/2034 8/7/2024

## 2024-09-16 ENCOUNTER — CLINICAL SUPPORT (OUTPATIENT)
Dept: OBSTETRICS AND GYNECOLOGY | Facility: CLINIC | Age: 54
End: 2024-09-16
Payer: COMMERCIAL

## 2024-09-16 DIAGNOSIS — N95.1 MENOPAUSAL SYMPTOMS: Primary | ICD-10-CM
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
## 2024-09-23 ENCOUNTER — OFFICE VISIT (OUTPATIENT)
Dept: OBSTETRICS AND GYNECOLOGY | Facility: CLINIC | Age: 54
End: 2024-09-23
Payer: COMMERCIAL

## 2024-09-23 VITALS — DIASTOLIC BLOOD PRESSURE: 83 MMHG | WEIGHT: 148 LBS | SYSTOLIC BLOOD PRESSURE: 117 MMHG | BODY MASS INDEX: 26.22 KG/M2

## 2024-09-23 DIAGNOSIS — Z87.42 HISTORY OF ABNORMAL CERVICAL PAP SMEAR: ICD-10-CM

## 2024-09-23 DIAGNOSIS — Z13.820 SCREENING FOR OSTEOPOROSIS: ICD-10-CM

## 2024-09-23 DIAGNOSIS — Z12.31 BREAST CANCER SCREENING BY MAMMOGRAM: ICD-10-CM

## 2024-09-23 DIAGNOSIS — N95.1 MENOPAUSAL SYMPTOMS: Primary | ICD-10-CM

## 2024-09-23 DIAGNOSIS — E55.9 VITAMIN D DEFICIENCY: ICD-10-CM

## 2024-09-23 DIAGNOSIS — Z79.890 SURGICAL MENOPAUSE ON HORMONE REPLACEMENT THERAPY: ICD-10-CM

## 2024-09-23 DIAGNOSIS — Z00.00 HEALTHCARE MAINTENANCE: ICD-10-CM

## 2024-09-23 DIAGNOSIS — T75.00XS EFFECTS OF LIGHTNING, SEQUELA: ICD-10-CM

## 2024-09-23 DIAGNOSIS — E89.40 SURGICAL MENOPAUSE ON HORMONE REPLACEMENT THERAPY: ICD-10-CM

## 2024-09-23 PROBLEM — T75.00XA: Status: ACTIVE | Noted: 2024-09-23

## 2024-09-23 PROCEDURE — 3074F SYST BP LT 130 MM HG: CPT | Mod: CPTII,S$GLB,, | Performed by: NURSE PRACTITIONER

## 2024-09-23 PROCEDURE — 3008F BODY MASS INDEX DOCD: CPT | Mod: CPTII,S$GLB,, | Performed by: NURSE PRACTITIONER

## 2024-09-23 PROCEDURE — 1160F RVW MEDS BY RX/DR IN RCRD: CPT | Mod: CPTII,S$GLB,, | Performed by: NURSE PRACTITIONER

## 2024-09-23 PROCEDURE — 1159F MED LIST DOCD IN RCRD: CPT | Mod: CPTII,S$GLB,, | Performed by: NURSE PRACTITIONER

## 2024-09-23 PROCEDURE — 99999 PR PBB SHADOW E&M-EST. PATIENT-LVL IV: CPT | Mod: PBBFAC,,, | Performed by: NURSE PRACTITIONER

## 2024-09-23 PROCEDURE — 3079F DIAST BP 80-89 MM HG: CPT | Mod: CPTII,S$GLB,, | Performed by: NURSE PRACTITIONER

## 2024-09-23 PROCEDURE — 99204 OFFICE O/P NEW MOD 45 MIN: CPT | Mod: S$GLB,,, | Performed by: NURSE PRACTITIONER

## 2024-09-23 NOTE — PROGRESS NOTES
Subjective:      Yanelis Lundy is a 54 y.o. female who presents to discuss hormone replacement therapy. Reports surgical menopause, TH-BSO with removal of 1/3rd of vaginal canal at age 36. Use of MHT began post operatively. From [place] and recently moved to [place].      Patient is requesting hormone replacement therapy due to insomnia, moodiness, no energy, and hysterectomy with BSO, hot flashes, mental fatigue.The patient is taking hormone replacement therapy. Patient denies post-menopausal vaginal bleeding. The patient is sexually active.  She denies the following contraindications to HRT:  Vaginal bleeding, history of VTE/PE, thrombophilia,  breast cancer, or active liver disease.       Genetic breast cancer screening with negative panel per patient. Current use of Divigel 1 mg/gram daily, Testosterone 10% gel, with fingertip application to antecubital region daily. Requesting to restart Progesterone 20 % TD compounded cream, 2 clicks nightly as mood and sleep improvement.     History of Vitamin D deficiency, currently taking. Good diet, exercise.     No recent annual lab work, last PCP visit in 2023 without labs resulted.     Pap smear: 2008  Mammogram: none on record in [place]  DEXA: none    No visits with results within 3 Month(s) from this visit.   Latest known visit with results is:   Lab Visit on 02/22/2024   Component Date Value Ref Range Status    NMO Interpretive Comments 02/22/2024 SEE BELOW   Final    PAVAL ELO-1, Serum 02/22/2024 Negative  Negative Final    PAVAL ELO-2, Serum 02/22/2024 Negative  Negative Final    PAVAL ELO-3, Serum 02/22/2024 Negative  Negative Final    PAVAL AGNA-1, Serum 02/22/2024 Negative  Negative Final    PAVAL, PCA-1, Serum 02/22/2024 Negative  Negative Final    Purkinje Cell Cytoplasmic Ab, Type* 02/22/2024 Negative  Negative Final    PAVAL, PCA-Tr, Serum 02/22/2024 Negative  Negative Final    PAVAL,  Amphiphysin Ab, Serum 02/22/2024 Negative  Negative Final    CRMP-5  IgG 2024 Negative  Negative Final    P/Q Type Calcium Channel Ab 2024 0.00  <=0.02 nmol/L Final    Neuronal (V-G) K+ Channel Ab, Serum 2024 0.00  <=0.02 nmol/L Final    IFA NOTES 2024 None.   Final       Past Medical History:   Diagnosis Date    Anemia     Brain injury     PTSD (post-traumatic stress disorder)     Vaginal cancer      Past Surgical History:   Procedure Laterality Date    APPENDECTOMY      HYSTERECTOMY       Social History     Tobacco Use    Smoking status: Never    Smokeless tobacco: Never   Substance Use Topics    Alcohol use: Yes    Drug use: Never     No family history on file.  OB History    Para Term  AB Living   2       2     SAB IAB Ectopic Multiple Live Births   2              # Outcome Date GA Lbr Say/2nd Weight Sex Type Anes PTL Lv   2 SAB            1 SAB               Obstetric Comments   Early miscarries for both pregnancies.         Current Outpatient Medications:     buPROPion (WELLBUTRIN XL) 300 MG 24 hr tablet, Take 1 tablet (300 mg total) by mouth once daily., Disp: 90 tablet, Rfl: 3    cetirizine (ZYRTEC) 10 MG tablet, Take 1 tablet (10 mg total) by mouth daily as needed for Rhinitis., Disp: 30 tablet, Rfl: 0    DULoxetine (CYMBALTA) 30 MG capsule, Take 1 capsule (30 mg total) by mouth once daily., Disp: 90 capsule, Rfl: 3    DULoxetine (CYMBALTA) 60 MG capsule, Take 1 capsule (60 mg total) by mouth once daily., Disp: 90 capsule, Rfl: 3    estradioL (DIVIGEL) 0.75 mg/0.75 gram (0.1%) GlPk, Place 1 packet (0.75 gram) onto the skin every evening., Disp: 30 packet, Rfl: 11    estradioL (DIVIGEL) 1 mg/gram (0.1 %) topical gel, Place 1 packet (1 gram) onto the skin every morning., Disp: 30 g, Rfl: 11    fluticasone propionate (FLONASE) 50 mcg/actuation nasal spray, 2 sprays (100 mcg total) by Each Nostril route daily as needed for Rhinitis (nasal congestion)., Disp: 15.8 mL, Rfl: 0    galcanezumab-gnlm 120 mg/mL PnIj, Inject 240 mg loading dose  (administered as two consecutive injections of 120 mg each), Disp: 2 mL, Rfl: 0    galcanezumab-gnlm 120 mg/mL PnIj, Inject 1 mL (120 mg total) into the skin every 28 days - Maintenance dose, Disp: 1 mL, Rfl: 6    testosterone (FORTESTA) 10 mg/0.5 gram /actuation GlPm, Fortesta  EXTREMELY SMALL AMOUNT, SIZE OF A BURTON QD, Disp: , Rfl:     testosterone (TESTIM) 50 mg/5 gram (1 %) Gel, Apply topically once daily., Disp: , Rfl:     azelastine (ASTELIN) 137 mcg (0.1 %) nasal spray, 1 spray (137 mcg total) by Nasal route 2 (two) times daily as needed for Rhinitis., Disp: 30 mL, Rfl: 0    linaCLOtide (LINZESS) 290 mcg Cap capsule, QD (Patient not taking: Reported on 9/23/2024), Disp: , Rfl:     Vitals:    09/23/24 0828   BP: 117/83   Weight: 67.1 kg (148 lb)   PainSc: 0-No pain     Body mass index is 26.22 kg/m².    Assessment:    Menopausal symptoms  -     Testosterone; Future; Expected date: 09/23/2024  -     Testosterone, Free; Future; Expected date: 09/23/2024  -     Estradiol; Future; Expected date: 09/23/2024  -     Progesterone; Future; Expected date: 09/23/2024  -     TSH; Future; Expected date: 09/23/2024  -     LUTEINIZING HORMONE; Future; Expected date: 09/23/2024  -     Thyroid peroxidase antibody; Future; Expected date: 09/23/2024  -     FOLLICLE STIMULATING HORMONE; Future; Expected date: 09/23/2024    Breast cancer screening by mammogram  -     Mammo Digital Screening Bilat w/ Matthew; Future; Expected date: 09/23/2024    Healthcare maintenance  -     HEMOGLOBIN A1C; Future; Expected date: 09/23/2024  -     CBC Auto Differential; Future; Expected date: 09/23/2024  -     Lipid panel; Future; Expected date: 09/23/2024  -     COMPREHENSIVE METABOLIC PANEL; Future; Expected date: 09/23/2024  -     Thyroid peroxidase antibody; Future; Expected date: 09/23/2024    Screening for osteoporosis  -     DXA Bone Density Axial Skeleton 1 or more sites; Future; Expected date: 09/23/2024        Plan:     Risks and benefits  of hormone replacement therapy were discussed.  Hormone replacement therapy options, including bioidentical versus non-bioidentical hormones, as well as alternatives discussed.    Refill:   Prometrium 20% compounded in [employer] base, 2 clicks to skin nightly     HRT and annual lab work. DEXA and mammogram ordered. Discussed need for completion of labs/imaging/WWE prior MHT adjustments.  PCP referral.      Follow up in 4 weeks for WWE- plan for pap and MHT FU.  Will recheck labs once on typical optimal dose or if having side effects.  Instructed patient to call if she experiences any side effects or has any questions.       GALINA PerazaC

## 2024-09-24 ENCOUNTER — TELEPHONE (OUTPATIENT)
Dept: OBSTETRICS AND GYNECOLOGY | Facility: CLINIC | Age: 54
End: 2024-09-24
Payer: COMMERCIAL

## 2024-09-27 ENCOUNTER — LAB VISIT (OUTPATIENT)
Dept: LAB | Facility: OTHER | Age: 54
End: 2024-09-27
Attending: NURSE PRACTITIONER
Payer: COMMERCIAL

## 2024-09-27 DIAGNOSIS — E55.9 VITAMIN D DEFICIENCY: ICD-10-CM

## 2024-09-27 DIAGNOSIS — Z00.00 HEALTHCARE MAINTENANCE: ICD-10-CM

## 2024-09-27 DIAGNOSIS — N95.1 MENOPAUSAL SYMPTOMS: ICD-10-CM

## 2024-09-27 LAB
25(OH)D3+25(OH)D2 SERPL-MCNC: 24 NG/ML (ref 30–96)
ALBUMIN SERPL BCP-MCNC: 3.8 G/DL (ref 3.5–5.2)
ALP SERPL-CCNC: 77 U/L (ref 55–135)
ALT SERPL W/O P-5'-P-CCNC: 13 U/L (ref 10–44)
ANION GAP SERPL CALC-SCNC: 7 MMOL/L (ref 8–16)
AST SERPL-CCNC: 18 U/L (ref 10–40)
BASOPHILS # BLD AUTO: 0.05 K/UL (ref 0–0.2)
BASOPHILS NFR BLD: 0.9 % (ref 0–1.9)
BILIRUB SERPL-MCNC: 0.4 MG/DL (ref 0.1–1)
BUN SERPL-MCNC: 9 MG/DL (ref 6–20)
CALCIUM SERPL-MCNC: 9.1 MG/DL (ref 8.7–10.5)
CHLORIDE SERPL-SCNC: 104 MMOL/L (ref 95–110)
CHOLEST SERPL-MCNC: 173 MG/DL (ref 120–199)
CHOLEST/HDLC SERPL: 2.4 {RATIO} (ref 2–5)
CO2 SERPL-SCNC: 28 MMOL/L (ref 23–29)
CREAT SERPL-MCNC: 0.8 MG/DL (ref 0.5–1.4)
DIFFERENTIAL METHOD BLD: NORMAL
EOSINOPHIL # BLD AUTO: 0.1 K/UL (ref 0–0.5)
EOSINOPHIL NFR BLD: 1.4 % (ref 0–8)
ERYTHROCYTE [DISTWIDTH] IN BLOOD BY AUTOMATED COUNT: 12.4 % (ref 11.5–14.5)
EST. GFR  (NO RACE VARIABLE): >60 ML/MIN/1.73 M^2
ESTIMATED AVG GLUCOSE: 91 MG/DL (ref 68–131)
ESTRADIOL SERPL-MCNC: 111 PG/ML
FSH SERPL-ACNC: 22.5 MIU/ML
GLUCOSE SERPL-MCNC: 87 MG/DL (ref 70–110)
HBA1C MFR BLD: 4.8 % (ref 4–5.6)
HCT VFR BLD AUTO: 40.8 % (ref 37–48.5)
HDLC SERPL-MCNC: 73 MG/DL (ref 40–75)
HDLC SERPL: 42.2 % (ref 20–50)
HGB BLD-MCNC: 13.5 G/DL (ref 12–16)
IMM GRANULOCYTES # BLD AUTO: 0.01 K/UL (ref 0–0.04)
IMM GRANULOCYTES NFR BLD AUTO: 0.2 % (ref 0–0.5)
LDLC SERPL CALC-MCNC: 86.6 MG/DL (ref 63–159)
LH SERPL-ACNC: 11.8 MIU/ML
LYMPHOCYTES # BLD AUTO: 1.4 K/UL (ref 1–4.8)
LYMPHOCYTES NFR BLD: 24.2 % (ref 18–48)
MCH RBC QN AUTO: 30.8 PG (ref 27–31)
MCHC RBC AUTO-ENTMCNC: 33.1 G/DL (ref 32–36)
MCV RBC AUTO: 93 FL (ref 82–98)
MONOCYTES # BLD AUTO: 0.6 K/UL (ref 0.3–1)
MONOCYTES NFR BLD: 10.6 % (ref 4–15)
NEUTROPHILS # BLD AUTO: 3.7 K/UL (ref 1.8–7.7)
NEUTROPHILS NFR BLD: 62.7 % (ref 38–73)
NONHDLC SERPL-MCNC: 100 MG/DL
NRBC BLD-RTO: 0 /100 WBC
PLATELET # BLD AUTO: 211 K/UL (ref 150–450)
PMV BLD AUTO: 9.8 FL (ref 9.2–12.9)
POTASSIUM SERPL-SCNC: 4.3 MMOL/L (ref 3.5–5.1)
PROGEST SERPL-MCNC: 0.4 NG/ML
PROT SERPL-MCNC: 7.1 G/DL (ref 6–8.4)
RBC # BLD AUTO: 4.38 M/UL (ref 4–5.4)
SODIUM SERPL-SCNC: 139 MMOL/L (ref 136–145)
TESTOST SERPL-MCNC: 70 NG/DL (ref 5–73)
THYROPEROXIDASE IGG SERPL-ACNC: <6 IU/ML
TRIGL SERPL-MCNC: 67 MG/DL (ref 30–150)
TSH SERPL DL<=0.005 MIU/L-ACNC: 1.73 UIU/ML (ref 0.4–4)
WBC # BLD AUTO: 5.87 K/UL (ref 3.9–12.7)

## 2024-09-27 PROCEDURE — 80061 LIPID PANEL: CPT | Performed by: NURSE PRACTITIONER

## 2024-09-27 PROCEDURE — 85025 COMPLETE CBC W/AUTO DIFF WBC: CPT | Performed by: NURSE PRACTITIONER

## 2024-09-27 PROCEDURE — 86376 MICROSOMAL ANTIBODY EACH: CPT | Performed by: NURSE PRACTITIONER

## 2024-09-27 PROCEDURE — 80053 COMPREHEN METABOLIC PANEL: CPT | Performed by: NURSE PRACTITIONER

## 2024-09-27 PROCEDURE — 83036 HEMOGLOBIN GLYCOSYLATED A1C: CPT | Performed by: NURSE PRACTITIONER

## 2024-09-27 PROCEDURE — 84144 ASSAY OF PROGESTERONE: CPT | Performed by: NURSE PRACTITIONER

## 2024-09-27 PROCEDURE — 83002 ASSAY OF GONADOTROPIN (LH): CPT | Performed by: NURSE PRACTITIONER

## 2024-09-27 PROCEDURE — 84443 ASSAY THYROID STIM HORMONE: CPT | Performed by: NURSE PRACTITIONER

## 2024-09-27 PROCEDURE — 36415 COLL VENOUS BLD VENIPUNCTURE: CPT | Performed by: NURSE PRACTITIONER

## 2024-09-27 PROCEDURE — 82306 VITAMIN D 25 HYDROXY: CPT | Performed by: NURSE PRACTITIONER

## 2024-09-27 PROCEDURE — 84403 ASSAY OF TOTAL TESTOSTERONE: CPT | Performed by: NURSE PRACTITIONER

## 2024-09-27 PROCEDURE — 82670 ASSAY OF TOTAL ESTRADIOL: CPT | Performed by: NURSE PRACTITIONER

## 2024-09-27 PROCEDURE — 83001 ASSAY OF GONADOTROPIN (FSH): CPT | Performed by: NURSE PRACTITIONER

## 2024-09-27 PROCEDURE — 84402 ASSAY OF FREE TESTOSTERONE: CPT | Performed by: NURSE PRACTITIONER

## 2024-09-30 LAB — TESTOST FREE SERPL-MCNC: 0.9 PG/ML

## 2024-10-07 ENCOUNTER — PATIENT MESSAGE (OUTPATIENT)
Dept: OBSTETRICS AND GYNECOLOGY | Facility: CLINIC | Age: 54
End: 2024-10-07
Payer: COMMERCIAL

## 2024-10-07 ENCOUNTER — HOSPITAL ENCOUNTER (OUTPATIENT)
Dept: RADIOLOGY | Facility: OTHER | Age: 54
Discharge: HOME | End: 2024-10-07
Attending: NURSE PRACTITIONER
Payer: COMMERCIAL

## 2024-10-07 DIAGNOSIS — E55.9 VITAMIN D DEFICIENCY: Primary | ICD-10-CM

## 2024-10-07 DIAGNOSIS — Z13.820 SCREENING FOR OSTEOPOROSIS: ICD-10-CM

## 2024-10-07 DIAGNOSIS — Z12.31 BREAST CANCER SCREENING BY MAMMOGRAM: ICD-10-CM

## 2024-10-07 PROCEDURE — 77080 DXA BONE DENSITY AXIAL: CPT | Mod: TC

## 2024-10-07 PROCEDURE — 77080 DXA BONE DENSITY AXIAL: CPT | Mod: 26,,, | Performed by: RADIOLOGY

## 2024-10-07 PROCEDURE — 77067 SCR MAMMO BI INCL CAD: CPT | Mod: TC

## 2024-10-07 RX ORDER — ERGOCALCIFEROL 1.25 MG/1
50000 CAPSULE ORAL
Qty: 12 CAPSULE | Refills: 0 | Status: SHIPPED | OUTPATIENT
Start: 2024-10-07 | End: 2025-01-05

## 2024-10-14 ENCOUNTER — OFFICE VISIT (OUTPATIENT)
Dept: OBSTETRICS AND GYNECOLOGY | Facility: CLINIC | Age: 54
End: 2024-10-14
Payer: COMMERCIAL

## 2024-10-14 DIAGNOSIS — Z79.890 SURGICAL MENOPAUSE ON HORMONE REPLACEMENT THERAPY: Primary | ICD-10-CM

## 2024-10-14 DIAGNOSIS — E89.40 SURGICAL MENOPAUSE ON HORMONE REPLACEMENT THERAPY: Primary | ICD-10-CM

## 2024-10-14 PROCEDURE — 3044F HG A1C LEVEL LT 7.0%: CPT | Mod: CPTII,95,, | Performed by: NURSE PRACTITIONER

## 2024-10-14 PROCEDURE — 99214 OFFICE O/P EST MOD 30 MIN: CPT | Mod: 95,,, | Performed by: NURSE PRACTITIONER

## 2024-10-14 PROCEDURE — 1159F MED LIST DOCD IN RCRD: CPT | Mod: CPTII,95,, | Performed by: NURSE PRACTITIONER

## 2024-10-14 PROCEDURE — 1160F RVW MEDS BY RX/DR IN RCRD: CPT | Mod: CPTII,95,, | Performed by: NURSE PRACTITIONER

## 2024-10-15 ENCOUNTER — TELEPHONE (OUTPATIENT)
Dept: OBSTETRICS AND GYNECOLOGY | Facility: CLINIC | Age: 54
End: 2024-10-15
Payer: COMMERCIAL

## 2024-10-15 NOTE — TELEPHONE ENCOUNTER
----- Message from Ania Carrasco NP sent at 10/7/2024  1:24 PM CDT -----  Please schedule Vit D level in 3 months

## 2024-11-04 ENCOUNTER — PATIENT MESSAGE (OUTPATIENT)
Dept: OTOLARYNGOLOGY | Facility: CLINIC | Age: 54
End: 2024-11-04
Payer: COMMERCIAL

## 2024-11-04 ENCOUNTER — OFFICE VISIT (OUTPATIENT)
Dept: OBSTETRICS AND GYNECOLOGY | Facility: CLINIC | Age: 54
End: 2024-11-04
Payer: COMMERCIAL

## 2024-11-04 VITALS — DIASTOLIC BLOOD PRESSURE: 69 MMHG | WEIGHT: 146.81 LBS | BODY MASS INDEX: 26 KG/M2 | SYSTOLIC BLOOD PRESSURE: 111 MMHG

## 2024-11-04 DIAGNOSIS — R59.0 LYMPHADENOPATHY, CERVICAL: ICD-10-CM

## 2024-11-04 DIAGNOSIS — N95.1 MENOPAUSAL VAGINAL DRYNESS: ICD-10-CM

## 2024-11-04 DIAGNOSIS — Z11.51 SCREENING FOR HPV (HUMAN PAPILLOMAVIRUS): ICD-10-CM

## 2024-11-04 DIAGNOSIS — N76.0 ACUTE VAGINITIS: ICD-10-CM

## 2024-11-04 DIAGNOSIS — Z01.419 WOMEN'S ANNUAL ROUTINE GYNECOLOGICAL EXAMINATION: Primary | ICD-10-CM

## 2024-11-04 DIAGNOSIS — Z12.4 PAP SMEAR FOR CERVICAL CANCER SCREENING: ICD-10-CM

## 2024-11-04 PROCEDURE — 87624 HPV HI-RISK TYP POOLED RSLT: CPT | Performed by: NURSE PRACTITIONER

## 2024-11-04 PROCEDURE — 1160F RVW MEDS BY RX/DR IN RCRD: CPT | Mod: CPTII,S$GLB,, | Performed by: NURSE PRACTITIONER

## 2024-11-04 PROCEDURE — 3078F DIAST BP <80 MM HG: CPT | Mod: CPTII,S$GLB,, | Performed by: NURSE PRACTITIONER

## 2024-11-04 PROCEDURE — 3074F SYST BP LT 130 MM HG: CPT | Mod: CPTII,S$GLB,, | Performed by: NURSE PRACTITIONER

## 2024-11-04 PROCEDURE — 99396 PREV VISIT EST AGE 40-64: CPT | Mod: S$GLB,,, | Performed by: NURSE PRACTITIONER

## 2024-11-04 PROCEDURE — 3044F HG A1C LEVEL LT 7.0%: CPT | Mod: CPTII,S$GLB,, | Performed by: NURSE PRACTITIONER

## 2024-11-04 PROCEDURE — 3008F BODY MASS INDEX DOCD: CPT | Mod: CPTII,S$GLB,, | Performed by: NURSE PRACTITIONER

## 2024-11-04 PROCEDURE — 1159F MED LIST DOCD IN RCRD: CPT | Mod: CPTII,S$GLB,, | Performed by: NURSE PRACTITIONER

## 2024-11-04 PROCEDURE — 99999 PR PBB SHADOW E&M-EST. PATIENT-LVL III: CPT | Mod: PBBFAC,,, | Performed by: NURSE PRACTITIONER

## 2024-11-04 PROCEDURE — 88175 CYTOPATH C/V AUTO FLUID REDO: CPT | Performed by: NURSE PRACTITIONER

## 2024-11-04 RX ORDER — FLUCONAZOLE 150 MG/1
150 TABLET ORAL
Qty: 2 TABLET | Refills: 0 | Status: SHIPPED | OUTPATIENT
Start: 2024-11-04 | End: 2024-11-08
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
## 2024-11-05 ENCOUNTER — OFFICE VISIT (OUTPATIENT)
Dept: OTOLARYNGOLOGY | Facility: CLINIC | Age: 54
End: 2024-11-05
Payer: COMMERCIAL

## 2024-11-05 DIAGNOSIS — G43.809 VESTIBULAR MIGRAINE: Primary | ICD-10-CM

## 2024-11-05 DIAGNOSIS — F07.81 POST CONCUSSION SYNDROME: ICD-10-CM

## 2024-11-05 PROCEDURE — 3044F HG A1C LEVEL LT 7.0%: CPT | Mod: CPTII,S$GLB,, | Performed by: OTOLARYNGOLOGY

## 2024-11-05 PROCEDURE — 99213 OFFICE O/P EST LOW 20 MIN: CPT | Mod: S$GLB,,, | Performed by: OTOLARYNGOLOGY

## 2024-11-07 ENCOUNTER — PATIENT MESSAGE (OUTPATIENT)
Dept: ADMINISTRATIVE | Facility: HOSPITAL | Age: 54
End: 2024-11-07
Payer: COMMERCIAL

## 2024-11-07 LAB
FINAL PATHOLOGIC DIAGNOSIS: NORMAL
Lab: NORMAL

## 2024-11-08 DIAGNOSIS — Z12.11 SCREENING FOR COLON CANCER: ICD-10-CM

## 2024-11-08 LAB
HPV HR 12 DNA SPEC QL NAA+PROBE: NEGATIVE
HPV16 AG SPEC QL: NEGATIVE
HPV18 DNA SPEC QL NAA+PROBE: NEGATIVE
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
## 2024-12-06 ENCOUNTER — HOSPITAL ENCOUNTER (EMERGENCY)
Facility: OTHER | Age: 54
Discharge: HOME | End: 2024-12-06
Attending: EMERGENCY MEDICINE
Payer: COMMERCIAL

## 2024-12-06 VITALS
BODY MASS INDEX: 24.8 KG/M2 | SYSTOLIC BLOOD PRESSURE: 117 MMHG | DIASTOLIC BLOOD PRESSURE: 62 MMHG | WEIGHT: 140 LBS | RESPIRATION RATE: 18 BRPM | TEMPERATURE: 98 F | OXYGEN SATURATION: 100 % | HEART RATE: 72 BPM

## 2024-12-06 DIAGNOSIS — M79.89 LEG SWELLING: ICD-10-CM

## 2024-12-06 DIAGNOSIS — S83.91XA SPRAIN OF RIGHT KNEE, UNSPECIFIED LIGAMENT, INITIAL ENCOUNTER: Primary | ICD-10-CM

## 2024-12-06 DIAGNOSIS — M25.469 KNEE SWELLING: ICD-10-CM

## 2024-12-06 LAB
ALBUMIN SERPL BCP-MCNC: 4.3 G/DL (ref 3.5–5.2)
ALP SERPL-CCNC: 74 U/L (ref 40–150)
ALT SERPL W/O P-5'-P-CCNC: 19 U/L (ref 10–44)
ANION GAP SERPL CALC-SCNC: 10 MMOL/L (ref 8–16)
AST SERPL-CCNC: 19 U/L (ref 10–40)
BASOPHILS # BLD AUTO: 0.05 K/UL (ref 0–0.2)
BASOPHILS NFR BLD: 1 % (ref 0–1.9)
BILIRUB SERPL-MCNC: 0.6 MG/DL (ref 0.1–1)
BUN SERPL-MCNC: 15 MG/DL (ref 6–20)
CALCIUM SERPL-MCNC: 9.6 MG/DL (ref 8.7–10.5)
CHLORIDE SERPL-SCNC: 104 MMOL/L (ref 95–110)
CO2 SERPL-SCNC: 24 MMOL/L (ref 23–29)
CREAT SERPL-MCNC: 0.8 MG/DL (ref 0.5–1.4)
CRP SERPL-MCNC: 5.4 MG/L (ref 0–8.2)
DIFFERENTIAL METHOD BLD: ABNORMAL
EOSINOPHIL # BLD AUTO: 0 K/UL (ref 0–0.5)
EOSINOPHIL NFR BLD: 0.6 % (ref 0–8)
ERYTHROCYTE [DISTWIDTH] IN BLOOD BY AUTOMATED COUNT: 12.2 % (ref 11.5–14.5)
EST. GFR  (NO RACE VARIABLE): >60 ML/MIN/1.73 M^2
GLUCOSE SERPL-MCNC: 92 MG/DL (ref 70–110)
HCT VFR BLD AUTO: 45.6 % (ref 37–48.5)
HCV AB SERPL QL IA: NEGATIVE
HGB BLD-MCNC: 15.2 G/DL (ref 12–16)
HIV 1+2 AB+HIV1 P24 AG SERPL QL IA: NEGATIVE
IMM GRANULOCYTES # BLD AUTO: 0.01 K/UL (ref 0–0.04)
IMM GRANULOCYTES NFR BLD AUTO: 0.2 % (ref 0–0.5)
LYMPHOCYTES # BLD AUTO: 1.3 K/UL (ref 1–4.8)
LYMPHOCYTES NFR BLD: 23.9 % (ref 18–48)
MCH RBC QN AUTO: 31.1 PG (ref 27–31)
MCHC RBC AUTO-ENTMCNC: 33.3 G/DL (ref 32–36)
MCV RBC AUTO: 93 FL (ref 82–98)
MONOCYTES # BLD AUTO: 0.6 K/UL (ref 0.3–1)
MONOCYTES NFR BLD: 10.9 % (ref 4–15)
NEUTROPHILS # BLD AUTO: 3.3 K/UL (ref 1.8–7.7)
NEUTROPHILS NFR BLD: 63.4 % (ref 38–73)
NRBC BLD-RTO: 0 /100 WBC
PLATELET # BLD AUTO: 188 K/UL (ref 150–450)
PMV BLD AUTO: 10.1 FL (ref 9.2–12.9)
POTASSIUM SERPL-SCNC: 4.2 MMOL/L (ref 3.5–5.1)
PROT SERPL-MCNC: 7.7 G/DL (ref 6–8.4)
RBC # BLD AUTO: 4.88 M/UL (ref 4–5.4)
SODIUM SERPL-SCNC: 138 MMOL/L (ref 136–145)
WBC # BLD AUTO: 5.22 K/UL (ref 3.9–12.7)

## 2024-12-06 PROCEDURE — 99284 EMERGENCY DEPT VISIT MOD MDM: CPT | Mod: 25

## 2024-12-06 PROCEDURE — 96374 THER/PROPH/DIAG INJ IV PUSH: CPT

## 2024-12-06 PROCEDURE — 63600175 PHARM REV CODE 636 W HCPCS: Performed by: NURSE PRACTITIONER

## 2024-12-06 PROCEDURE — 80053 COMPREHEN METABOLIC PANEL: CPT | Performed by: NURSE PRACTITIONER

## 2024-12-06 PROCEDURE — 86140 C-REACTIVE PROTEIN: CPT | Performed by: NURSE PRACTITIONER

## 2024-12-06 PROCEDURE — 29505 APPLICATION LONG LEG SPLINT: CPT | Mod: RT

## 2024-12-06 PROCEDURE — 86803 HEPATITIS C AB TEST: CPT | Performed by: EMERGENCY MEDICINE

## 2024-12-06 PROCEDURE — 85025 COMPLETE CBC W/AUTO DIFF WBC: CPT | Performed by: NURSE PRACTITIONER

## 2024-12-06 PROCEDURE — 87389 HIV-1 AG W/HIV-1&-2 AB AG IA: CPT | Performed by: EMERGENCY MEDICINE

## 2024-12-06 PROCEDURE — 25000003 PHARM REV CODE 250: Performed by: NURSE PRACTITIONER

## 2024-12-06 RX ORDER — METHOCARBAMOL 500 MG/1
500 TABLET, FILM COATED ORAL 4 TIMES DAILY
Qty: 40 TABLET | Refills: 0 | Status: SHIPPED | OUTPATIENT
Start: 2024-12-06 | End: 2024-12-16

## 2024-12-06 RX ORDER — KETOROLAC TROMETHAMINE 30 MG/ML
10 INJECTION, SOLUTION INTRAMUSCULAR; INTRAVENOUS
Status: COMPLETED | OUTPATIENT
Start: 2024-12-06 | End: 2024-12-06

## 2024-12-06 RX ORDER — KETOROLAC TROMETHAMINE 10 MG/1
10 TABLET, FILM COATED ORAL EVERY 6 HOURS
Qty: 20 TABLET | Refills: 0 | Status: SHIPPED | OUTPATIENT
Start: 2024-12-06 | End: 2024-12-11

## 2024-12-06 RX ORDER — METHOCARBAMOL 500 MG/1
1000 TABLET, FILM COATED ORAL
Status: COMPLETED | OUTPATIENT
Start: 2024-12-06 | End: 2024-12-06

## 2024-12-06 RX ADMIN — METHOCARBAMOL 1000 MG: 500 TABLET ORAL at 11:12

## 2024-12-06 RX ADMIN — KETOROLAC TROMETHAMINE 10 MG: 30 INJECTION, SOLUTION INTRAMUSCULAR; INTRAVENOUS at 11:12

## 2024-12-06 NOTE — DISCHARGE INSTRUCTIONS
Weight-bearing as tolerated but you can use the crutches for pain relief when ambulating.  The knee immobilizer can be worn as needed.  If you would like to purchase a neoprene knee brace, you may have more comfort compared to the knee immobilizer.  It is possible that you have a ligament that can not be seen on x-rays were ultrasound.  Please follow up with Orthopedics for further evaluation.

## 2024-12-06 NOTE — ED TRIAGE NOTES
Pt. Presents to the ED in POV. Pt. States that her right leg started hurting on Wednesday. Pt's leg is swollen, + pulses. Pt. Denies any recent trauma. Pt. Is alert and ABC's are intact.

## 2024-12-06 NOTE — Clinical Note
"Yanelis"SAMI Lundy was seen and treated in our emergency department on 12/6/2024.  She may return to work on 12/10/2024.       If you have any questions or concerns, please don't hesitate to call.      Angeles CATALAN RN    "

## 2024-12-06 NOTE — ED PROVIDER NOTES
Encounter Date: 12/6/2024       History     Chief Complaint   Patient presents with    Leg Pain     Reports R leg pain onset Wednesday. Pt endorses unable to bare weight on leg. Denies any recent falls. +leg swelling      54-year-old female which presents to the emergency room with right knee pain that began on Wednesday.  Patient states that she is a  and was doing a lot of work with her legs crossed while sitting in the chair upon standing up that night she began to have right lower thigh/knee pain.  Patient states then on Thursday got much worse to the point where she felt like her knee was going to give out when she was walking.  Upon waking up this morning her knee was extremely swollen and she is unable to range it.  Patient took 2 Advil this morning for pain.  Denies any trauma or injury to her knee.  Recently started a new hormone replacement 2 weeks ago.    The history is provided by the patient.     Review of patient's allergies indicates:   Allergen Reactions    Chicken derived Dermatitis, Hives, Itching, Other (See Comments) and Swelling    Blue dye Other (See Comments)    Penicillins Other (See Comments)     Past Medical History:   Diagnosis Date    Anemia     Brain injury     PTSD (post-traumatic stress disorder)     Vaginal cancer      Past Surgical History:   Procedure Laterality Date    APPENDECTOMY      HYSTERECTOMY       No family history on file.  Social History     Tobacco Use    Smoking status: Never    Smokeless tobacco: Never   Substance Use Topics    Alcohol use: Yes    Drug use: Never     Review of Systems   Constitutional:  Negative for fever.   HENT:  Negative for sore throat.    Respiratory:  Negative for shortness of breath.    Cardiovascular:  Negative for chest pain.   Gastrointestinal:  Negative for nausea.   Genitourinary:  Negative for dysuria.   Musculoskeletal:  Positive for arthralgias, joint swelling and myalgias. Negative for back pain.   Skin:  Negative for rash.    Neurological:  Negative for weakness.   Hematological:  Does not bruise/bleed easily.   All other systems reviewed and are negative.      Physical Exam     Initial Vitals [12/06/24 0918]   BP Pulse Resp Temp SpO2   117/71 82 16 98.1 °F (36.7 °C) 100 %      MAP       --         Physical Exam    Nursing note and vitals reviewed.  Constitutional: She appears well-developed and well-nourished.   HENT:   Head: Normocephalic and atraumatic.   Eyes: Conjunctivae and EOM are normal. Pupils are equal, round, and reactive to light.   Neck:   Normal range of motion.  Cardiovascular:  Normal rate, regular rhythm, normal heart sounds and intact distal pulses.     Exam reveals no gallop and no friction rub.       No murmur heard.  Pulmonary/Chest: Breath sounds normal. No respiratory distress. She has no wheezes. She has no rhonchi. She has no rales. She exhibits no tenderness.   Abdominal: Abdomen is soft. Bowel sounds are normal. She exhibits no distension and no mass. There is abdominal tenderness. There is no rebound and no guarding.   Musculoskeletal:         General: Tenderness and edema present.      Cervical back: Normal range of motion.      Right knee: Swelling and effusion present. No bony tenderness. Decreased range of motion (Secondary to pain). Tenderness present over the medial joint line and lateral joint line.      Left knee: Normal.     Neurological: She is alert and oriented to person, place, and time. She has normal strength. GCS score is 15. GCS eye subscore is 4. GCS verbal subscore is 5. GCS motor subscore is 6.   Skin: Skin is warm. Capillary refill takes less than 2 seconds. No rash noted. No erythema.   Psychiatric: She has a normal mood and affect.       ED Course   Procedures  Labs Reviewed   CBC W/ AUTO DIFFERENTIAL - Abnormal       Result Value    WBC 5.22      RBC 4.88      Hemoglobin 15.2      Hematocrit 45.6      MCV 93      MCH 31.1 (*)     MCHC 33.3      RDW 12.2      Platelets 188      MPV  10.1      Immature Granulocytes 0.2      Gran # (ANC) 3.3      Immature Grans (Abs) 0.01      Lymph # 1.3      Mono # 0.6      Eos # 0.0      Baso # 0.05      nRBC 0      Gran % 63.4      Lymph % 23.9      Mono % 10.9      Eosinophil % 0.6      Basophil % 1.0      Differential Method Automated     HEPATITIS C ANTIBODY    Hepatitis C Ab Negative      Narrative:     Release to patient->Immediate   HIV 1 / 2 ANTIBODY    HIV 1/2 Ag/Ab Negative      Narrative:     Release to patient->Immediate   COMPREHENSIVE METABOLIC PANEL    Sodium 138      Potassium 4.2      Chloride 104      CO2 24      Glucose 92      BUN 15      Creatinine 0.8      Calcium 9.6      Total Protein 7.7      Albumin 4.3      Total Bilirubin 0.6      Alkaline Phosphatase 74      AST 19      ALT 19      eGFR >60      Anion Gap 10     C-REACTIVE PROTEIN    CRP 5.4            Imaging Results              US Lower Extremity Arteries Right (Final result)  Result time 12/06/24 12:56:04      Final result by Perry Beaulieu III, MD (12/06/24 12:56:04)                   Impression:      No focal hemodynamically significant stenosis 50% or greater right lower extremity seen.      Electronically signed by: Perry Beaulieu MD  Date:    12/06/2024  Time:    12:56               Narrative:    EXAMINATION:  US LOWER EXTREMITY ARTERIES RIGHT    CLINICAL HISTORY:  Other specified soft tissue disorders    FINDINGS:  Velocities taper and a normal stepwise fashion.  No velocity doubling is seen.  No significant plaque seen.                                       US Lower Extremity Veins Right (Final result)  Result time 12/06/24 12:51:42      Final result by Prery Beaulieu III, MD (12/06/24 12:51:42)                   Impression:      No right lower extremity DVT seen.      Electronically signed by: Perry Beaulieu MD  Date:    12/06/2024  Time:    12:51               Narrative:    EXAMINATION:  US LOWER EXTREMITY VEINS RIGHT    CLINICAL HISTORY:  Other specified soft  tissue disorders    FINDINGS:  Right lower extremity left common femoral veins demonstrate no DVT.  There is normal flow and compressibility.  No right popliteal fossa mass, cyst, aneurysm seen.                                       X-Ray Knee 1 or 2 View Right (Final result)  Result time 12/06/24 11:47:34   Procedure changed from X-Ray Knee 3 View Right     Final result by Perry Beaulieu III, MD (12/06/24 11:47:34)                   Impression:      No acute process seen.      Electronically signed by: Perry Beaulieu MD  Date:    12/06/2024  Time:    11:47               Narrative:    EXAMINATION:  XR KNEE 1 OR 2 VIEW RIGHT    CLINICAL HISTORY:  leg pain;  Effusion, unspecified knee    FINDINGS:  Knee two views right.    No fracture, dislocation, or bone destruction seen.  No OCD seen.  No joint effusion seen.                                       Medications   ketorolac injection 9.999 mg (9.999 mg Intravenous Given 12/6/24 1157)   methocarbamoL tablet 1,000 mg (1,000 mg Oral Given 12/6/24 1157)     Medical Decision Making  54-year-old female which presents to the emergency room with right knee pain that radiates up to her groin.  Ultrasound is negative for DVT and there is no evidence of arterial stenosis or obstruction.  Knee x-ray does not show a large effusion although on exam she has significant swelling to the proximal knee.  Patient placed in a knee immobilizer and a referral has been placed for ortho.  She was given Toradol and Robaxin while in the ED which did alleviate most of her pain.  She will be discharged with the same medications. Patient given strict return precautions and voiced understanding of all discharge instructions. Pt was stable at discharge.       Differential diagnosis:  Knee sprain, DVT, ischemic leg, joint effusion    Problems Addressed:  Knee swelling: acute illness or injury  Leg swelling: acute illness or injury  Sprain of right knee, unspecified ligament, initial encounter:  acute illness or injury    Amount and/or Complexity of Data Reviewed  Labs: ordered.  Radiology: ordered.    Risk  Prescription drug management.               ED Course as of 12/06/24 1409   Fri Dec 06, 2024   0923 BP: 117/71 [AT]   0923 Temp: 98.1 °F (36.7 °C) [AT]   0923 Temp Source: Oral [AT]   0923 Pulse: 82 [AT]   0923 Resp: 16 [AT]   0923 SpO2: 100 % [AT]   1008 Offered pt pain medication and she declined at this time. She states she took 2 advil this morning.  [AT]   1148 Patient requesting medicine now that she had her x-rays done as her knee is excruciating pain. [AT]      ED Course User Index  [AT] Constanza Cardenas FNP                           Clinical Impression:  Final diagnoses:  [M79.89] Leg swelling  [M25.469] Knee swelling  [S83.91XA] Sprain of right knee, unspecified ligament, initial encounter (Primary)          ED Disposition Condition    Discharge Stable          ED Prescriptions       Medication Sig Dispense Start Date End Date Auth. Provider    methocarbamoL (ROBAXIN) 500 MG Tab Take 1 tablet (500 mg total) by mouth 4 (four) times daily. for 10 days 40 tablet 12/6/2024 12/16/2024 Constanza Cardenas FNP    ketorolac (TORADOL) 10 mg tablet Take 1 tablet (10 mg total) by mouth every 6 (six) hours. for 5 days 20 tablet 12/6/2024 12/11/2024 Constanza Cardenas FNP          Follow-up Information       Follow up With Specialties Details Why Contact Info    Peggy Dasilva, DO Family Medicine Schedule an appointment as soon as possible for a visit  As needed [id number] [facility] [place] [address]  [phone]               Constanza Cardenas FNP  12/06/24 1405

## 2024-12-09 DIAGNOSIS — E55.9 VITAMIN D DEFICIENCY: ICD-10-CM

## 2024-12-10 RX ORDER — ERGOCALCIFEROL 1.25 MG/1
50000 CAPSULE ORAL
Qty: 12 CAPSULE | Refills: 0 | Status: SHIPPED | OUTPATIENT
Start: 2024-12-10 | End: 2025-03-10

## 2024-12-11 ENCOUNTER — OFFICE VISIT (OUTPATIENT)
Dept: ORTHOPEDICS | Facility: CLINIC | Age: 54
End: 2024-12-11
Payer: COMMERCIAL

## 2024-12-11 DIAGNOSIS — M25.469 KNEE SWELLING: ICD-10-CM

## 2024-12-11 DIAGNOSIS — S83.91XA SPRAIN OF RIGHT KNEE, UNSPECIFIED LIGAMENT, INITIAL ENCOUNTER: ICD-10-CM

## 2024-12-11 DIAGNOSIS — I89.0 LYMPHEDEMA: Primary | ICD-10-CM

## 2024-12-11 PROCEDURE — 99999 PR PBB SHADOW E&M-EST. PATIENT-LVL III: CPT | Mod: PBBFAC,,, | Performed by: PHYSICIAN ASSISTANT

## 2024-12-12 ENCOUNTER — TELEPHONE (OUTPATIENT)
Dept: CARDIOLOGY | Facility: CLINIC | Age: 54
End: 2024-12-12
Payer: COMMERCIAL

## 2024-12-12 NOTE — TELEPHONE ENCOUNTER
Patient scheduled.    Future Appointments   Date Time Provider Department Center   1/3/2025  8:20 AM Julius Phelps MD PhD [facility] [place]   1/4/2025  8:20 AM LAB, APPOINTMENT [facility] LAB IM Adria Perales [facility]   2/25/2025  8:15 AM Thu Russell PA-C [facility]   4/14/2025 10:00 AM Ania Carrasco NP [facility]

## 2024-12-12 NOTE — TELEPHONE ENCOUNTER
----- Message from Med Assistant Vences sent at 12/12/2024  3:39 PM CST -----  Regarding: Lymphedema  Good afternoon, Marcos Carbone is referring this patient for evaluation of Lymphedema of left leg. Please assist with scheduling. Thank you.

## 2024-12-13 ENCOUNTER — TELEPHONE (OUTPATIENT)
Dept: CARDIOLOGY | Facility: CLINIC | Age: 54
End: 2024-12-13
Payer: COMMERCIAL

## 2025-01-03 ENCOUNTER — PATIENT MESSAGE (OUTPATIENT)
Dept: OBSTETRICS AND GYNECOLOGY | Facility: CLINIC | Age: 55
End: 2025-01-03
Payer: COMMERCIAL

## 2025-01-03 DIAGNOSIS — N95.1 MENOPAUSAL VAGINAL DRYNESS: ICD-10-CM

## 2025-01-04 ENCOUNTER — LAB VISIT (OUTPATIENT)
Dept: LAB | Facility: HOSPITAL | Age: 55
End: 2025-01-04
Payer: COMMERCIAL

## 2025-01-04 DIAGNOSIS — E55.9 VITAMIN D DEFICIENCY: ICD-10-CM

## 2025-01-04 LAB — 25(OH)D3+25(OH)D2 SERPL-MCNC: 22 NG/ML (ref 30–96)

## 2025-01-04 PROCEDURE — 82306 VITAMIN D 25 HYDROXY: CPT | Performed by: NURSE PRACTITIONER

## 2025-01-04 PROCEDURE — 36415 COLL VENOUS BLD VENIPUNCTURE: CPT | Performed by: NURSE PRACTITIONER

## 2025-01-07 ENCOUNTER — OFFICE VISIT (OUTPATIENT)
Dept: CARDIOLOGY | Facility: CLINIC | Age: 55
End: 2025-01-07
Payer: COMMERCIAL

## 2025-01-07 VITALS
SYSTOLIC BLOOD PRESSURE: 113 MMHG | HEART RATE: 86 BPM | HEIGHT: 63 IN | WEIGHT: 139 LBS | BODY MASS INDEX: 24.63 KG/M2 | DIASTOLIC BLOOD PRESSURE: 67 MMHG

## 2025-01-07 DIAGNOSIS — R60.0 EDEMA OF RIGHT LOWER EXTREMITY: Primary | ICD-10-CM

## 2025-01-07 PROCEDURE — 3008F BODY MASS INDEX DOCD: CPT | Mod: CPTII,S$GLB,, | Performed by: INTERNAL MEDICINE

## 2025-01-07 PROCEDURE — 99999 PR PBB SHADOW E&M-EST. PATIENT-LVL IV: CPT | Mod: PBBFAC,,, | Performed by: INTERNAL MEDICINE

## 2025-01-07 PROCEDURE — 1159F MED LIST DOCD IN RCRD: CPT | Mod: CPTII,S$GLB,, | Performed by: INTERNAL MEDICINE

## 2025-01-07 PROCEDURE — 3078F DIAST BP <80 MM HG: CPT | Mod: CPTII,S$GLB,, | Performed by: INTERNAL MEDICINE

## 2025-01-07 PROCEDURE — 3074F SYST BP LT 130 MM HG: CPT | Mod: CPTII,S$GLB,, | Performed by: INTERNAL MEDICINE

## 2025-01-07 PROCEDURE — 99203 OFFICE O/P NEW LOW 30 MIN: CPT | Mod: S$GLB,,, | Performed by: INTERNAL MEDICINE

## 2025-01-07 NOTE — PATIENT INSTRUCTIONS
Assessment/Plan  Yanelis Lundy is a 54 y.o. female with PTSD, brain injury, who presents for an initial appointment.    Right Leg Swelling- Likely due to a component of venous insufficiency. Check BLE venous reflux study. BLE Venous Ultrasound on 12/6/2024 revealed no right lower extremity DVT seen. BLE Arterial Ultrasound on 12/6/2024 showed no focal hemodynamically significant stenosis 50% or greater right lower extremity seen. Recommend wearing graduated compression hose.  Limit sodium intake to 2000 mg daily.  Limit volume intake to 1.5 L daily.  Elevate legs when resting.    Follow up in 3 months

## 2025-01-07 NOTE — PROGRESS NOTES
[facility]      Chief Complaint   Patient presents with    Claudication       Patient ID: Yanelis Lundy is a 54 y.o. female with PTSD, brain injury, who presents for an initial appointment. Pertinent history/events are as follows:     - Marcos Carboen is referring this patient for evaluation of Lymphedema of left leg    HPI:  Ms. Lundy reports right leg swelling which started in 11/2024. States swelling has improved. BLE Venous Ultrasound on 12/6/2024 revealed no right lower extremity DVT seen. BLE Arterial Ultrasound on 12/6/2024 showed no focal hemodynamically significant stenosis 50% or greater right lower extremity seen     Past Medical History:   Diagnosis Date    Anemia     Brain injury     PTSD (post-traumatic stress disorder)     Vaginal cancer      Past Surgical History:   Procedure Laterality Date    APPENDECTOMY      HYSTERECTOMY       Social History     Socioeconomic History    Marital status: Single   Tobacco Use    Smoking status: Never    Smokeless tobacco: Never   Substance and Sexual Activity    Alcohol use: Yes    Drug use: Never     Social Drivers of Health     Financial Resource Strain: Low Risk  (2/15/2024)    Overall Financial Resource Strain (CARDIA)     Difficulty of Paying Living Expenses: Not very hard   Food Insecurity: No Food Insecurity (2/15/2024)    Hunger Vital Sign     Worried About Running Out of Food in the Last Year: Never true     Ran Out of Food in the Last Year: Never true   Transportation Needs: No Transportation Needs (2/15/2024)    PRAPARE - Transportation     Lack of Transportation (Medical): No     Lack of Transportation (Non-Medical): No   Physical Activity: Insufficiently Active (2/15/2024)    Exercise Vital Sign     Days of Exercise per Week: 3 days     Minutes of Exercise per Session: 10 min   Stress: Stress Concern Present (2/15/2024)    [employer] - Occupational Stress Questionnaire     Feeling of Stress : To some  extent   Housing Stability: Low Risk  (2/15/2024)    Housing Stability Vital Sign     Unable to Pay for Housing in the Last Year: No     Number of Places Lived in the Last Year: 1     Unstable Housing in the Last Year: No     No family history on file.    Review of patient's allergies indicates:   Allergen Reactions    Chicken derived Dermatitis, Hives, Itching, Other (See Comments) and Swelling    Blue dye Other (See Comments)    Penicillins Other (See Comments)       Medication List with Changes/Refills   Current Medications    AZELASTINE (ASTELIN) 137 MCG (0.1 %) NASAL SPRAY    1 spray (137 mcg total) by Nasal route 2 (two) times daily as needed for Rhinitis.    BUPROPION (WELLBUTRIN XL) 300 MG 24 HR TABLET    Take 1 tablet (300 mg total) by mouth once daily.    CETIRIZINE (ZYRTEC) 10 MG TABLET    Take 1 tablet (10 mg total) by mouth daily as needed for Rhinitis.    DULOXETINE (CYMBALTA) 30 MG CAPSULE    Take 1 capsule (30 mg total) by mouth once daily.    DULOXETINE (CYMBALTA) 60 MG CAPSULE    Take 1 capsule (60 mg total) by mouth once daily.    ERGOCALCIFEROL (VITAMIN D2) 50,000 UNIT CAP    Take 1 capsule (50,000 Units total) by mouth every 7 days.    ESTRADIOL (DIVIGEL) 0.75 MG/0.75 GRAM (0.1%) GLPK    Place 1 packet (0.75 gram) onto the skin every evening.    ESTRADIOL (DIVIGEL) 1 MG/GRAM (0.1 %) TOPICAL GEL    Place 1 packet (1 gram) onto the skin every morning.    FLUTICASONE PROPIONATE (FLONASE) 50 MCG/ACTUATION NASAL SPRAY    2 sprays (100 mcg total) by Each Nostril route daily as needed for Rhinitis (nasal congestion).    GALCANEZUMAB-GNLM 120 MG/ML PNIJ    Inject 240 mg loading dose (administered as two consecutive injections of 120 mg each)    GALCANEZUMAB-GNLM 120 MG/ML PNIJ    Inject 1 mL (120 mg total) into the skin every 28 days - Maintenance dose    LINACLOTIDE (LINZESS) 290 MCG CAP CAPSULE    QD    PRASTERONE, DHEA, (INTRAROSA) 6.5 MG INST    Place 6.5 mg vaginally every evening.    TESTOSTERONE  "(FORTESTA) 10 MG/0.5 GRAM /ACTUATION GLPM    Fortesta   EXTREMELY SMALL AMOUNT, SIZE OF A BURTON QD    TESTOSTERONE (TESTIM) 50 MG/5 GRAM (1 %) GEL    Apply topically once daily.       Review of Systems  Constitution: Denies chills, fever, and sweats.  HENT: Denies headaches or blurry vision.  Cardiovascular: Denies chest pain or irregular heart beat.  Respiratory: Denies cough or shortness of breath.  Gastrointestinal: Denies abdominal pain, nausea, or vomiting.  Musculoskeletal: Denies muscle cramps.  Neurological: Denies dizziness or focal weakness.  Psychiatric/Behavioral: Normal mental status.  Hematologic/Lymphatic: Denies bleeding problem or easy bruising/bleeding.  Skin: Denies rash or suspicious lesions    Physical Examination  /67   Pulse 86   Ht 5' 3" (1.6 m)   Wt 63 kg (139 lb)   BMI 24.62 kg/m²     Constitutional: No acute distress, conversant  HEENT: Sclera anicteric, Pupils equal, round and reactive to light, extraocular motions intact, Oropharynx clear  Neck: No JVD, no carotid bruits  Cardiovascular: regular rate and rhythm, no murmur, rubs or gallops, normal S1/S2  Pulmonary: Clear to auscultation bilaterally  Abdominal: Abdomen soft, nontender, nondistended, positive bowel sounds  Extremities: No lower extremity edema,   Pulses:  Carotid pulses are 2+ on the right side, and 2+ on the left side.  Radial pulses are 2+ on the right side, and 2+ on the left side.   Femoral pulses are 2+ on the right side, and 2+ on the left side.  Popliteal pulses are 2+ on the right side, and 2+ on the left side.   Dorsalis pedis pulses are 2+ on the right side, and 2+ on the left side.   Posterior tibial pulses are 2+ on the right side, and 2+ on the left side.    Skin: No ecchymosis, erythema, or ulcers  Psych: Alert and oriented x 3, appropriate affect  Neuro: CNII-XII intact, no focal deficits    Labs:  Most Recent Data  CBC:   Lab Results   Component Value Date    WBC 5.22 12/06/2024    HGB 15.2 " "12/06/2024    HCT 45.6 12/06/2024     12/06/2024    MCV 93 12/06/2024    RDW 12.2 12/06/2024     BMP:   Lab Results   Component Value Date     12/06/2024    K 4.2 12/06/2024     12/06/2024    CO2 24 12/06/2024    BUN 15 12/06/2024    CREATININE 0.8 12/06/2024    GLU 92 12/06/2024    CALCIUM 9.6 12/06/2024     LFTS;   Lab Results   Component Value Date    PROT 7.7 12/06/2024    ALBUMIN 4.3 12/06/2024    BILITOT 0.6 12/06/2024    AST 19 12/06/2024    ALKPHOS 74 12/06/2024    ALT 19 12/06/2024     COAGS: No results found for: "INR", "PROTIME", "PTT"  FLP:   Lab Results   Component Value Date    CHOL 173 09/27/2024    HDL 73 09/27/2024    LDLCALC 86.6 09/27/2024    TRIG 67 09/27/2024    CHOLHDL 42.2 09/27/2024     CARDIAC: No results found for: "TROPONINI", "CKTOTAL", "CKMB", "BNP"    Imaging:    BLE Venous Ultrasound 12/6/2024:  No right lower extremity DVT seen.     BLE Arterial Ultrasound 12/6/2024:   No focal hemodynamically significant stenosis 50% or greater right lower extremity seen     Assessment/Plan  Yanelis Lundy is a 54 y.o. female with PTSD, brain injury, who presents for an initial appointment.    Right Leg Swelling- Likely due to a component of venous insufficiency. Check BLE venous reflux study. BLE Venous Ultrasound on 12/6/2024 revealed no right lower extremity DVT seen. BLE Arterial Ultrasound on 12/6/2024 showed no focal hemodynamically significant stenosis 50% or greater right lower extremity seen. Recommend wearing graduated compression hose.  Limit sodium intake to 2000 mg daily.  Limit volume intake to 1.5 L daily.  Elevate legs when resting.    Follow up in 3 months    Total duration of face to face visit time 20 minutes.  Total time spent counseling greater than fifty percent of total visit time.  Counseling included discussion regarding imaging findings, diagnosis, possibilities, treatment options, risks and benefits.  The patient had many questions regarding the " options and long-term effects.    Julius Phelps MD, PhD  Interventional Cardiology

## 2025-01-09 ENCOUNTER — PATIENT MESSAGE (OUTPATIENT)
Dept: OBSTETRICS AND GYNECOLOGY | Facility: CLINIC | Age: 55
End: 2025-01-09
Payer: COMMERCIAL

## 2025-01-15 ENCOUNTER — HOSPITAL ENCOUNTER (OUTPATIENT)
Dept: CARDIOLOGY | Facility: HOSPITAL | Age: 55
Discharge: HOME | End: 2025-01-15
Attending: INTERNAL MEDICINE
Payer: COMMERCIAL

## 2025-01-15 DIAGNOSIS — R60.0 EDEMA OF RIGHT LOWER EXTREMITY: ICD-10-CM

## 2025-01-15 LAB
LEFT GREAT SAPHENOUS DISTAL THIGH DIA: 0.32 CM
LEFT GREAT SAPHENOUS DISTAL THIGH REFLUX: 2295 MS
LEFT GREAT SAPHENOUS JUNCTION DIA: 0.57 CM
LEFT GREAT SAPHENOUS KNEE DIA: 0.38 CM
LEFT GREAT SAPHENOUS KNEE REFLUX: 2655 MS
LEFT GREAT SAPHENOUS MIDDLE THIGH DIA: 0.5 CM
LEFT GREAT SAPHENOUS MIDDLE THIGH REFLUX: 1553 MS
LEFT GREAT SAPHENOUS PROXIMAL CALF DIA: 0.3 CM
LEFT SMALL SAPHENOUS KNEE DIA: 0.1 CM
RIGHT GREAT SAPHENOUS DISTAL THIGH DIA: 0.36 CM
RIGHT GREAT SAPHENOUS DISTAL THIGH REFLUX: 5024 MS
RIGHT GREAT SAPHENOUS JUNCTION DIA: 0.5 CM
RIGHT GREAT SAPHENOUS KNEE DIA: 0.37 CM
RIGHT GREAT SAPHENOUS KNEE REFLUX: 2449 MS
RIGHT GREAT SAPHENOUS MIDDLE THIGH DIA: 0.45 CM
RIGHT GREAT SAPHENOUS PROXIMAL CALF DIA: 0.3 CM
RIGHT SMALL SAPHENOUS KNEE DIA: 0.4 CM
RIGHT SMALL SAPHENOUS SPJ DIA: 0.2 CM

## 2025-01-15 PROCEDURE — 93970 EXTREMITY STUDY: CPT | Mod: 26,,, | Performed by: INTERNAL MEDICINE

## 2025-01-15 PROCEDURE — 93970 EXTREMITY STUDY: CPT | Mod: TC

## 2025-02-02 NOTE — PROGRESS NOTES
NEUROPSYCHOLOGY FOLLOW-UP APPOINTMENT - CONFIDENTIAL    Referring Provider: Peggy Dasilva DO   Medical Necessity: Follow up on cognitive and emotional functioning, participate in treatment planning/management, and provide supportive therapy in the setting of post-concussion syndrome   Date Conducted: 2025  Present At Visit: the patient  Billin = 50 minutes  Referral Diagnoses: F07.81 (ICD-10-CM) - Post-concussion syndrome   Consent: The patient expressed an understanding of the purpose of the evaluation and consented to all procedures. We discussed the limits of confidentiality and discussed an emergency plan.    TELEMEDICINE DETAILS:  The patient location is: [place]  The chief complaint leading to consultation is: post-concussion syndrome   Visit type: Virtual visit with synchronous audio and video  Total time spent with patient: 50 minutes  Each patient to whom he or she provides medical services by telemedicine is: (1) informed of the relationship between the physician and patient and the respective role of any other health care provider with respect to management of the patient; and (2) notified that he or she may decline to receive medical services by telemedicine and may withdraw from such care at any time.  Notes: The patient consented to all procedures and an emergency plan was discussed in detail.     ASSESSMENT & PLAN   Ms. Yanelis Lundy is an 54 y.o., female with 17 years of education and pertinent medical history including multiple concussions, PTSD, and anxiety who returns for a neuropsychological re-evaluation in the setting of post-concussion syndrome and ongoing cognitive trouble.     Full report to follow completion of testing ( at 12:30 PM). Concern for ongoing impact of depression and anxiety. Daily headaches could be another factor impacting cognition. She was informed that I have low suspicion for ADHD since that is a neurodevelopmental condition, but that she can experience  symptoms like those seen in ADHD from the conditions/factors listed above.     Problems Addressed This Visit:   Mild episode of recurrent major depressive disorder    Anxiety    Cognitive complaints        Thank you for allowing me to assist in Ms. Yanelis Lundy's care. If you have any questions, please contact me at [phone].      Jessica Ray, PhD, ABPP  Board Certified in Clinical Neuropsychology  [facility] - Department of Neurology    SUBJECTIVE   Ms. Yanelis Lundy is an 54 y.o., female with 17 years of education and pertinent medical history including multiple concussions, PTSD, and anxiety who returns for a neuropsychological re-evaluation in the setting of post-concussion syndrome and ongoing cognitive trouble.     She was initially seen for evaluation back in February 2023, results revealing the following:     Compared to average range premorbid estimates (based on both demographic information and a word reading test), results of the current evaluation reveal a mostly intact and at expectation cognitive profile. Processing speed was significantly reduced, as were any tasks that had a speeded component to them (fine motor dexterity, simple and divided attention measures, verbal fluency, time to completion of a visuospatial constructional task) though completion of the tasks themselves was intact/error free. As stated below, testing is interpreted with caution and as such, no neurocognitive disorder diagnosis is warranted at this time.      I am very concerned about the psychological health factors that Ms. Lundy is experiencing and believe these to be the likely culprit of her cognitive inefficiencies. Specifically, she reported experiencing a severe degree of clinically significant depressive symptoms, a moderate degree of clinically significant anxiety symptoms, and her score on a PTSD screening questionnaire was significantly elevated, suggesting that she would likely meet  "criteria for a diagnosis of posttraumatic stress disorder. She is presently attempting to treat these conditions with medication (Xanax, Wellbutrin, Effexor).     Overall, I believe next steps for Ms. Lundy include intensive outpatient treatment for her significant psychological distress. Ideally, I believe she would benefit from an intensive outpatient program for PTSD management. Otherwise, I believe participating in weekly/twice weekly talk therapy to be paramount. Dr. Lin has placed a referral through [facility] and Ms. Lundy is scheduled for her first appointment in March. This is wonderful news as I believe that Ms. Lundy will notice significant improvement in her thinking and functioning and a full return to her cognitive baseline with treatment of her psychological health factors. I believe she is at risk for a Somatic Symptom Disorder and treatment of her psychological health factors together with her concussion management program should help to alleviate these symptoms as well.     She returns for a follow-up visit and is interested in undergoing repeat evaluation. She reports the following:     She starting taking adaptogens and nootropics about a year ago and has noticed significant improvement in her mental clarity. Can tell when she missing an afternoon dose, however, and states she is a "mess."   Can't stay focused.   Has taken online quizzes for ADD and notices she has many of the symptoms.   Memory and working memory are better. Trying to do things more consistently about where she puts things. Thata's where if she needs the energy and is anxious, she cna knock it out of the ballpark. Needs to go back to exercising and hasn't.   Has read Smart but Scattered several times and feels she almost lives by the suggestions in that book.   Still has to have alarms to feed her dogs.   Gotten better about time management and using a clock. Smart watch was too distracting. Tries to keep clocks around to keep " "herself on track. The other things with work - fast paced office so that is apart of it. Gives her all at her office everyday. They don't mind she take a lunch, they just don't like when she goes home to walk her dogs. 9 AM - 6:30 PM and if she doesn't leave for lunch but just takes a break for 30 minutes, she is mentally and physically "spent" by the time she gets home.    Daily Functioning   ADLs:    Bathing: Independent and without difficulty  Dressing: Independent and without difficulty  Grooming: Independent and without difficulty   Toileting: Independent and without difficulty  Transferring: Independent and without difficulty.  Eating: Independent and without difficulty.   IADLs:    Finances: Independent   Medication Mgmt: Independent. Sometimes misses her afternoon dose of adaptogen/nootropics.  Driving: still aware of her limitations with driving. Vision is better, but still has sensitivity to light/dark contrast   Household Mgmt: house is looking a little better since hiring a company to help her clear things/furniture out. That said, she herself has not done much of anything. Won't let people in her home except for one friend.    Cooking/Meal Preparation: will take her a whole afternoon to meal prep.   Shopping: was binge shopping for a while but has been able to stop herself from this habit. Has been able to make returns as well.   Appointment Mgmt: went back to using a chart to help her manage.   Employment: She states her work has been very understanding of her cognitive difficulties, but they also want her to move at a faster pace than she does. She has been talking with her bosses about restructuring her work so that she is participating in the activities she is good at (really good at trial and immediate, hot-fire problem-solving vs. reading and summarizing depositions). Really good at putting together trial binders, so her planning and organization skills are coming back but not completely. The " "other things she is going to do is move her job to part-time and go for her Masters of Divinity. Applying this month and would be starting in September. One program looking to see if she can start this summer. Looking to do hybrid of in-person and online.      Psychiatric/Neuropsychiatric Symptoms   Depression: still not great. Psychotherapy has been hard for her to get into. When she has wanted to do it, it's been hard to match up with someone. Has one therapist she is going to try. He does spiritual direction and pastoral counseling via teletherapy. No suicidal or homicidal ideation. Came off her depression medication for a good month around December. Cried the entire month. Taking Cymbalta and Wellbutrin again. Sleeps the entire weekends sometimes. Is more likely to not care for herself.   Marly/Hypomania: no  Anxiety/Stress: Can ruminate which interferes with her sleep. Stopped her Xanax cold turkey a while ago. No adverse effects, thankfully. When her brain is overwhelmed, she becomes psychologically overwhelmed and is not her best person.   Social Withdrawal: no  Neurovegetative Sxs:  Appetite: "weird" - will forget to eat sometimes. Keeping a log and watching her weight which has recently stabilized. Luncheon meat, cheese, greek yogurt, granola. Alex'ts it most weeks. Not overeating sweets like she once was. Knows she is not drinking enough water.   Sleep: either sleeps too much or doesn't sleep at all. If she gets interested in anything or gets overwhelmed by something, won't be able to sleep. Sleeps better with muscle relaxer because her shoulders and neck often don't relax.  Energy: can vary. Task initiation is a problem   Hallucinations: no  Delusional/Paranoid Thinking: no  Impulsivity: no  Obsessive/Compulsive Behaviors: no  Disinhibition: no  Irritability/Agitation: no  Aggression: no  Apathy/Indifference: Task initiation is a problem  Other changes in personality: no     Physical Functioning   Tremor: " "no  Difficulty walking: no  Imbalance: no  Falls: no  Weakness: no  Trouble with fine motor movements: no   Lightheadedness: no  Urinary Urgency: no  Sensory Sxs: vision is better.   Pain: pain from headaches, neck and shoulder pain which Cymbalta helps with.   Physical Exercise Routine: walking her dogs     Other Updates   Took Emgality which seemed to control her headaches but caused her to not notice normal warning signs with her body. Got a blood clot in her leg. She has recovered from this, but stopped taking Emgality. Would rather have the headaches than have no warning signs of other issues that may be occurring.   Has an upcoming appointment in neurology for her headaches with JULES Miller.  Hasn't seen a neurologist since Dr. Lin left.   Plans to try to start psychotherapy.   Believes she needs a psychiatrist.          OBJECTIVE     MENTAL STATUS AND OBSERVATIONS:   Appearance: Casually dressed and adequate grooming/hygiene.   Alertness: Attentive and alert.   Orientation:   O x 4    Gait:  Unable to assess   Psychomotor:  Unable to assess   Handedness:  Right   Vision & Hearing:  Adequate for session   Speech/language: Normal in rate, rhythm, tone, and volume. No significant word finding difficulty observed. Comprehension was normal.   Mood/Affect:  The patients stated mood was "not so great." Affect was euthymic.   Interpersonal Behavior:  Rapport was quickly and easily established    Suicidality/Homicidality: Denied   Hallucinations/Delusions:  None evidenced or endorsed   Thought Content: Logical   Though Processes: Goal-directed   Insight & Judgment:  Appropriate   Participation in Interview:  Full     PROCEDURES/TESTS ADMINISTERED: Performed a review of pertinent medical records, reviewed limits to confidentiality, conducted a clinical interview, and explained procedures.    "

## 2025-02-03 ENCOUNTER — OFFICE VISIT (OUTPATIENT)
Dept: NEUROLOGY | Facility: CLINIC | Age: 55
End: 2025-02-03
Payer: COMMERCIAL

## 2025-02-03 DIAGNOSIS — F41.9 ANXIETY: ICD-10-CM

## 2025-02-03 DIAGNOSIS — R41.9 COGNITIVE COMPLAINTS: ICD-10-CM

## 2025-02-03 DIAGNOSIS — F33.0 MILD EPISODE OF RECURRENT MAJOR DEPRESSIVE DISORDER: Primary | ICD-10-CM

## 2025-02-03 PROCEDURE — 90791 PSYCH DIAGNOSTIC EVALUATION: CPT | Mod: 95,,, | Performed by: CLINICAL NEUROPSYCHOLOGIST

## 2025-02-03 PROCEDURE — 99499 UNLISTED E&M SERVICE: CPT | Mod: 95,,, | Performed by: CLINICAL NEUROPSYCHOLOGIST

## 2025-02-11 ENCOUNTER — PATIENT MESSAGE (OUTPATIENT)
Dept: OBSTETRICS AND GYNECOLOGY | Facility: CLINIC | Age: 55
End: 2025-02-11
Payer: COMMERCIAL

## 2025-02-14 ENCOUNTER — TELEPHONE (OUTPATIENT)
Facility: CLINIC | Age: 55
End: 2025-02-14
Payer: COMMERCIAL

## 2025-02-14 NOTE — TELEPHONE ENCOUNTER
Spoke to Pt and confirmed tomorrow's appt. Pt was advised to arrive 30 min early and informed about the 15 min arie period.

## 2025-02-17 ENCOUNTER — OFFICE VISIT (OUTPATIENT)
Facility: CLINIC | Age: 55
End: 2025-02-17
Payer: COMMERCIAL

## 2025-02-17 VITALS — DIASTOLIC BLOOD PRESSURE: 79 MMHG | HEART RATE: 78 BPM | SYSTOLIC BLOOD PRESSURE: 134 MMHG

## 2025-02-17 DIAGNOSIS — M54.2 CERVICALGIA OF OCCIPITO-ATLANTO-AXIAL REGION: ICD-10-CM

## 2025-02-17 DIAGNOSIS — S06.0X0S CONCUSSION WITHOUT LOSS OF CONSCIOUSNESS, SEQUELA: Primary | ICD-10-CM

## 2025-02-17 DIAGNOSIS — F34.89 OTHER SPECIFIED PERSISTENT MOOD DISORDERS: ICD-10-CM

## 2025-02-17 DIAGNOSIS — R41.89 COGNITIVE CHANGE: ICD-10-CM

## 2025-02-17 DIAGNOSIS — S13.4XXA WHIPLASH INJURY TO NECK, INITIAL ENCOUNTER: ICD-10-CM

## 2025-02-17 DIAGNOSIS — G44.86 CERVICOGENIC HEADACHE: ICD-10-CM

## 2025-02-17 DIAGNOSIS — G47.9 FATIGUE DUE TO SLEEP PATTERN DISTURBANCE: ICD-10-CM

## 2025-02-17 DIAGNOSIS — R53.83 FATIGUE DUE TO SLEEP PATTERN DISTURBANCE: ICD-10-CM

## 2025-02-17 PROBLEM — S06.0X0A CONCUSSION WITH NO LOSS OF CONSCIOUSNESS: Status: ACTIVE | Noted: 2025-02-17

## 2025-02-17 NOTE — PATIENT INSTRUCTIONS
Referral for lower neck/upper back to mid back PT with dry needling. No soft tissue manipulation of suboccipital region if you start to feel worse. All joint manipulation is fine.  Referral for ST for cognition  Referral for vestibular PT for eye movements  Agree with following up with neuropsychology  Referral for neuropsychiatry

## 2025-02-17 NOTE — PROGRESS NOTES
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 High.....................>190 mg/dL      HDL/Cholesterol Ratio 09/27/2024 42.2  20.0 - 50.0 % Final    Total Cholesterol/HDL Ratio 09/27/2024 2.4  2.0 - 5.0 Final    Non-HDL Cholesterol 09/27/2024 100  mg/dL Final    Comment: Risk category and Non-HDL cholesterol goals:  Coronary heart disease (CHD)or equivalent (10-year risk of CHD >20%):  Non-HDL cholesterol goal     <130 mg/dL  Two or more CHD risk factors and 10-year risk of CHD <= 20%:  Non-HDL cholesterol goal     <160 mg/dL  0 to 1 CHD risk factor:  Non-HDL cholesterol goal     <190 mg/dL      Sodium 09/27/2024 139  136 - 145 mmol/L Final    Potassium 09/27/2024 4.3  3.5 - 5.1 mmol/L Final    Chloride 09/27/2024 104  95 - 110 mmol/L Final    CO2 09/27/2024 28  23 - 29 mmol/L Final    Glucose 09/27/2024 87  70 - 110 mg/dL Final    BUN 09/27/2024 9  6 - 20 mg/dL Final    Creatinine 09/27/2024 0.8  0.5 - 1.4 mg/dL Final    Calcium 09/27/2024 9.1  8.7 - 10.5 mg/dL Final    Total Protein 09/27/2024 7.1  6.0 - 8.4 g/dL Final    Albumin 09/27/2024 3.8  3.5 - 5.2 g/dL Final    Total Bilirubin 09/27/2024 0.4  0.1 - 1.0 mg/dL Final    Comment: For infants and newborns, interpretation of results should be based  on gestational age, weight and in agreement with  clinical  observations.    Premature Infant recommended reference ranges:  Up to 24 hours.............<8.0 mg/dL  Up to 48 hours............<12.0 mg/dL  3-5 days..................<15.0 mg/dL  6-29 days.................<15.0 mg/dL      Alkaline Phosphatase 09/27/2024 77  55 - 135 U/L Final    AST 09/27/2024 18  10 - 40 U/L Final    ALT 09/27/2024 13  10 - 44 U/L Final    eGFR 09/27/2024 >60  >60 mL/min/1.73 m^2 Final    Anion Gap 09/27/2024 7 (L)  8 - 16 mmol/L Final    Thyroperoxidase Antibodies 09/27/2024 <6.0  <6.0 IU/mL Final    Follicle Stimulating Hormone 09/27/2024 22.50  See Text mIU/mL Final    Comment: Female Reference Ranges:  Follicular Phase.................3.03-8.08 mIU/mL  Midcycle Peak....................2.55-16.69 mIU/mL  Luteal Phase.....................1.38-5.47 mIU/mL  Postmenopausal...................26..41 mIU/mL  Male Reference Range:............0.95-11.95 mIU/mL      Vit D, 25-Hydroxy 09/27/2024 24 (L)  30 - 96 ng/mL Final    Comment: Vitamin D deficiency.........<10 ng/mL                              Vitamin D insufficiency......10-29 ng/mL       Vitamin D sufficiency........> or equal to 30 ng/mL  Vitamin D toxicity............>100 ng/mL        2/22/24:  Serum PAVAL paraneoplastic panel negative    I personally reviewed all current labs noted in today's chart.    Imaging:  I personally reviewed all diagnostic images and reports and agree with findings in the radiographical report as noted below unless otherwise specified.    MRI Brain 1/28/23:  FINDINGS:  There is no evidence of hydrocephalus advanced volume loss mass effect intracranial hemorrhage or acute infarct.  No extra-axial collection.     A single punctate focus of increased signal is nonspecific within the right sided periventricular white matter with the parenchyma otherwise maintaining normal signal intensity.  No hemosiderin deposition is identified.     Normal arterial flow voids are preserved at the skull base.     The  visualized sinuses are clear with trace right mastoid mucosal thickening.     Impression:     Essentially normal appearance of the brain.  No acute intracranial process.    My read: No acute intracranial abnormalities. Normal sella    MRI C-Spine 4/28/23:  FINDINGS:  Alignment: Subtle anterolisthesis C5 relative to C6.     Vertebrae: Diffuse decrease T1 marrow signal. No fracture.     Discs: Mild disc height loss at C6-C7.     Cord: Normal.     Skull base and craniocervical junction: Normal.     Degenerative findings:     The atlanto-occipital and bilateral lateral masses of C1-2 appear normal.  (Only seen on sagittal images)     C2-C3: Unremarkable     C3-C4: Unremarkable     C4-C5: Unremarkable     C5-C6: Unremarkable     C6-C7: Subtle disc bulge, no canal stenosis or foraminal narrowing.     C7-T1: Unremarkable     The upper thoracic region appears normal.     Paraspinal muscles & soft tissues: Unremarkable.     Impression:     Very mild disc bulge at C6-C7.  No canal stenosis or foraminal narrowing at any level.     Diffuse decreased T1 marrow signal can be seen with chronic anemia or other cause of regenerative bone marrow.  No strong evidence for malignant bone marrow replacement process.    My read: No acute spinal cord abnormalities. No significant stenosis    Assessment:       ICD-10-CM ICD-9-CM    1. Concussion without loss of consciousness, sequela  S06.0X0S 907.0 Ambulatory referral/consult to Concussion Management Program      2. Whiplash injury to neck, initial encounter  S13.4XXA 847.0       3. Cervicalgia of ppeupxsu-kdzckvl-dvzkr region  M54.2 723.1       4. Cervicogenic headache  G44.86 784.0       5. Other specified persistent mood disorders  F34.89 296.99       6. Cognitive change  R41.89 799.59       7. Fatigue due to sleep pattern disturbance  R53.83 780.79     G47.9 780.50       54 y.o. female with h/o PTSD, anxiety, depression, vaginal cancer s/p resection and radiation, TMJ s/p surgery who  presents for concussion evaluation. On exam, she has occipital point tenderness over the left lesser occipital nerve without induction of headaches with no jump sign and no twitch response and no referred pain, bilateral upper trap spasm. We discussed that there is currently no universally accepted definition of concussion. We discussed that concussion is a traumatic brain injury. We discussed that concussion can occur as a result of an impact to the head or to the body. We discussed that our clinic considers concussion definitive if there is transient disruption of brain activity such as with loss of consciousness, amnesia as it relates to the day of the injury, or reports of neurological dysfunction on the day of injury. We discussed typical course, signs & symptoms, diagnostic findings, and treatment for concussion. We discussed that whiplash injury is a neck injury that can occur at a lower impact speed or force than concussion. We discussed that whiplash symptoms can be the same as concussion symptoms. We discussed typical course, signs & symptoms, diagnostic findings, and treatment for whiplash. We discussed that head and/or neck injury can result in pain as well as cognitive, mood, and sleep disruption. We discussed that pain disturbances can disrupt sleep, cognition, and mood. We discussed that sleep disturbances can disrupt cognition, mood, and worsen pain. We discussed that mood disturbances can disrupt sleep, cognition, and worsen pain.     This patient's diagnoses of concussion and whiplash are chronic complicated injuries with multiple resultant symptoms as noted in the HPI as well as multiple subsequent diagnoses as a result of these injuries. I will be the primary provider who will both be providing and guiding ongoing medical care for these complex conditions.    Plan:     Referral for lower neck/upper back to mid back PT with dry needling. No soft tissue manipulation of suboccipital region if you  start to feel worse. All joint manipulation is fine.  Referral for ST for cognition  Referral for vestibular PT for eye movements  Agree with following up with neuropsychology  Referral for neuropsychiatry    89 minutes were spent on the date of this patient encounter, which includes: preparing to see the patient, reviewing previous history, obtaining new patient history, performing the physical exam, counseling and educating the patient and/or family/caregiver, ordering necessary medications or tests or referrals, documenting in the electronic medical record, coordinating care.    Magdaleno Sharif MD  Sports Neurology       [1]   Current Outpatient Medications on File Prior to Visit   Medication Sig Dispense Refill    azelastine (ASTELIN) 137 mcg (0.1 %) nasal spray 1 spray (137 mcg total) by Nasal route 2 (two) times daily as needed for Rhinitis. 30 mL 0    buPROPion (WELLBUTRIN XL) 300 MG 24 hr tablet Take 1 tablet (300 mg total) by mouth once daily. 90 tablet 3    cetirizine (ZYRTEC) 10 MG tablet Take 1 tablet (10 mg total) by mouth daily as needed for Rhinitis. 30 tablet 0    DULoxetine (CYMBALTA) 30 MG capsule Take 1 capsule (30 mg total) by mouth once daily. 90 capsule 3    DULoxetine (CYMBALTA) 60 MG capsule Take 1 capsule (60 mg total) by mouth once daily. 90 capsule 3    ergocalciferol (VITAMIN D2) 50,000 unit Cap Take 1 capsule (50,000 Units total) by mouth every 7 days. 12 capsule 0    fluticasone propionate (FLONASE) 50 mcg/actuation nasal spray 2 sprays (100 mcg total) by Each Nostril route daily as needed for Rhinitis (nasal congestion). 15.8 mL 0    prasterone, dhea, (INTRAROSA) 6.5 mg Inst Place 6.5 mg vaginally every evening. 28 each 11    testosterone (FORTESTA) 10 mg/0.5 gram /actuation GlPm Fortesta   EXTREMELY SMALL AMOUNT, SIZE OF A BURTON QD      testosterone (TESTIM) 50 mg/5 gram (1 %) Gel Apply topically once daily.      [DISCONTINUED] estradioL (DIVIGEL) 0.75 mg/0.75 gram (0.1%) GlPk Place 1  packet (0.75 gram) onto the skin every evening. 30 packet 11    [DISCONTINUED] estradioL (DIVIGEL) 1 mg/gram (0.1 %) topical gel Place 1 packet (1 gram) onto the skin every morning. 30 g 11    [DISCONTINUED] galcanezumab-gnlm 120 mg/mL PnIj Inject 240 mg loading dose (administered as two consecutive injections of 120 mg each) (Patient not taking: Reported on 2/17/2025) 2 mL 0    [DISCONTINUED] galcanezumab-gnlm 120 mg/mL PnIj Inject 1 mL (120 mg total) into the skin every 28 days - Maintenance dose (Patient not taking: Reported on 2/17/2025) 1 mL 6    [DISCONTINUED] linaCLOtide (LINZESS) 290 mcg Cap capsule  (Patient not taking: Reported on 2/17/2025)       No current facility-administered medications on file prior to visit.   [2]   Social History  Tobacco Use    Smoking status: Never    Smokeless tobacco: Never   Substance Use Topics    Alcohol use: Yes    Drug use: Never

## 2025-02-20 ENCOUNTER — OFFICE VISIT (OUTPATIENT)
Dept: NEUROLOGY | Facility: CLINIC | Age: 55
End: 2025-02-20
Payer: COMMERCIAL

## 2025-02-20 DIAGNOSIS — F41.9 ANXIETY: ICD-10-CM

## 2025-02-20 DIAGNOSIS — G44.309 POST-CONCUSSION HEADACHE: ICD-10-CM

## 2025-02-20 DIAGNOSIS — F34.89 OTHER SPECIFIED PERSISTENT MOOD DISORDERS: ICD-10-CM

## 2025-02-20 DIAGNOSIS — F33.1 MODERATE EPISODE OF RECURRENT MAJOR DEPRESSIVE DISORDER: ICD-10-CM

## 2025-02-20 DIAGNOSIS — F43.10 PTSD (POST-TRAUMATIC STRESS DISORDER): ICD-10-CM

## 2025-02-20 DIAGNOSIS — R41.89 OTHER SYMPTOMS AND SIGNS INVOLVING COGNITIVE FUNCTIONS AND AWARENESS: Primary | ICD-10-CM

## 2025-02-20 PROCEDURE — 99499 UNLISTED E&M SERVICE: CPT | Mod: S$GLB,,, | Performed by: CLINICAL NEUROPSYCHOLOGIST

## 2025-02-20 PROCEDURE — 96132 NRPSYC TST EVAL PHYS/QHP 1ST: CPT | Mod: S$GLB,,, | Performed by: CLINICAL NEUROPSYCHOLOGIST

## 2025-02-20 PROCEDURE — 99999 PR PBB SHADOW E&M-EST. PATIENT-LVL I: CPT | Mod: PBBFAC,,,

## 2025-02-20 PROCEDURE — 96139 PSYCL/NRPSYC TST TECH EA: CPT | Mod: S$GLB,,, | Performed by: CLINICAL NEUROPSYCHOLOGIST

## 2025-02-20 PROCEDURE — 96133 NRPSYC TST EVAL PHYS/QHP EA: CPT | Mod: S$GLB,,, | Performed by: CLINICAL NEUROPSYCHOLOGIST

## 2025-02-20 PROCEDURE — 96138 PSYCL/NRPSYC TECH 1ST: CPT | Mod: S$GLB,,, | Performed by: CLINICAL NEUROPSYCHOLOGIST

## 2025-02-20 NOTE — PROGRESS NOTES
NEUROPSYCHOLOGY FOLLOW-UP APPOINTMENT - CONFIDENTIAL    Referring Provider: Peggy Dasilva DO   Medical Necessity: Follow up on cognitive and emotional functioning, participate in treatment planning/management, and provide supportive therapy in the setting of post-concussion syndrome   Date Conducted: 02/03/2025 & 02/20/2025  Present At Visit: the patient  Referral Diagnoses: F07.81 (ICD-10-CM) - Post-concussion syndrome   Consent: The patient expressed an understanding of the purpose of the evaluation and consented to all procedures. We discussed the limits of confidentiality and discussed an emergency plan.    ASSESSMENT & PLAN   Ms. Yanelis Lundy is an 54 y.o., female with 17 years of education and pertinent medical history including multiple concussions, PTSD, and anxiety who returns for a neuropsychological re-evaluation in the setting of post-concussion syndrome and ongoing cognitive trouble.     Findings:   Compared to her 2023 evaluation, improvements are seen across the board, with many of her cognitive skills normalizing, including most aspects of language functioning, visuospatial perception and construction, learning, and memory. Though improvements are in processing speed, fine motor dexterity, and executive functioning since her 2023 evaluation, mild difficulty in these areas of cognition remain. Psychological testing reveals she is experiencing an even higher degree of clinically significant depressive symptoms compared to 2023 and while her symptoms of anxiety and PTSD have slightly improved since 2023, they are still highly elevated and clinically significant.    Assessment:   Overall, while I am happy there have been significant gains in Ms. Lundy' cognitive functioning, some difficulty in subcortical functioning remains. Her ongoing headaches may be contributing to some degree, in addition to her significant psychiatric symptoms. While Ms. Lundy was concerned she may have ADHD, she does not  meet criteria for ADHD as this is a neurodevelopmental condition. That said, she is experiencing similar symptoms to those seen in ADHD from the conditions/factors listed above. I remain hopeful that with further treatment, Ms. Lundy' psychiatric and cognitive functioning will continue to normalize.     Plan:   Results of this evaluation will be discussed during our scheduled feedback session. A copy of this report is being sent to Anisha Dasilva and [facility].  Ms. Lundy is encouraged to follow through with her plans to start psychotherapy in addition to receiving treatment with psychiatry, as I believe this is paramount for her cognitive recovery.   She is encouraged to follow recommendations from Dr. Sharif to manage headaches and pain, as these too are likely to lead to improvement in cognition.   Ms. Lundy is encouraged to continue participating in brain health behaviors. Information on brain health behaviors, cognitive tips and strategies, and a list of brain training applications with research showing they help to improve cognition will be provided.   Ms. Lundy is welcome to return for a check-in at any time to update treatment planning.       Diagnoses Addressed This Visit     Other symptoms and signs involving cognitive functions and awareness    PTSD (post-traumatic stress disorder)    Moderate episode of recurrent major depressive disorder    Anxiety    Other specified persistent mood disorders    Post-concussion headache      Thank you for allowing me to assist in Ms. Yanelis Lundy's care. If you have any questions, please contact me at [phone].      Jessica Ray, PhD, ABPP  Board Certified in Clinical Neuropsychology  [facility] - Department of Neurology    SUBJECTIVE   Ms. Yanelis Lundy is an 54 y.o., female with 17 years of education and pertinent medical history including multiple concussions, PTSD, and anxiety who returns for a neuropsychological re-evaluation in the setting of  post-concussion syndrome and ongoing cognitive trouble.     She was initially seen for evaluation back in February 2023, results revealing the following:     Compared to average range premorbid estimates (based on both demographic information and a word reading test), results of the current evaluation reveal a mostly intact and at expectation cognitive profile. Processing speed was significantly reduced, as were any tasks that had a speeded component to them (fine motor dexterity, simple and divided attention measures, verbal fluency, time to completion of a visuospatial constructional task) though completion of the tasks themselves was intact/error free. As stated below, testing is interpreted with caution and as such, no neurocognitive disorder diagnosis is warranted at this time.      I am very concerned about the psychological health factors that Ms. Lundy is experiencing and believe these to be the likely culprit of her cognitive inefficiencies. Specifically, she reported experiencing a severe degree of clinically significant depressive symptoms, a moderate degree of clinically significant anxiety symptoms, and her score on a PTSD screening questionnaire was significantly elevated, suggesting that she would likely meet criteria for a diagnosis of posttraumatic stress disorder. She is presently attempting to treat these conditions with medication (Xanax, Wellbutrin, Effexor).     Overall, I believe next steps for Ms. Lundy include intensive outpatient treatment for her significant psychological distress. Ideally, I believe she would benefit from an intensive outpatient program for PTSD management. Otherwise, I believe participating in weekly/twice weekly talk therapy to be paramount. Dr. Lin has placed a referral through [facility] and Ms. Lunyd is scheduled for her first appointment in March. This is wonderful news as I believe that Ms. Lundy will notice significant improvement in her thinking and  "functioning and a full return to her cognitive baseline with treatment of her psychological health factors. I believe she is at risk for a Somatic Symptom Disorder and treatment of her psychological health factors together with her concussion management program should help to alleviate these symptoms as well.     She returns for a follow-up visit and is interested in undergoing repeat evaluation. She reports the following:     She starting taking adaptogens and nootropics about a year ago and has noticed significant improvement in her mental clarity. Can tell when she missing an afternoon dose, however, and states she is a "mess."   Can't stay focused.   Has taken online quizzes for ADD and notices she has many of the symptoms.   Memory and working memory are better. Trying to do things more consistently including where she puts things. Needs to go back to exercising and hasn't.   Has read Smart but Scattered several times and feels she almost lives by the suggestions in that book.   Still has to have alarms to feed her dogs.   Gotten better about time management and using a clock. Smart watch was too distracting. Tries to keep clocks around to keep herself on track. The other things with work - fast paced office so that is apart of it. Gives her all at her office everyday. They don't mind she take a lunch, they just don't like when she goes home to walk her dogs. 9 AM - 6:30 PM and if she doesn't leave for lunch but just takes a break for 30 minutes, she is mentally and physically "spent" by the time she gets home.    Daily Functioning   ADLs:    Bathing: Independent and without difficulty  Dressing: Independent and without difficulty  Grooming: Independent and without difficulty   Toileting: Independent and without difficulty  Transferring: Independent and without difficulty.  Eating: Independent and without difficulty.   IADLs:    Finances: Independent   Medication Mgmt: Independent. Sometimes misses her " afternoon dose of adaptogen/nootropics.  Driving: still aware of her limitations with driving. Vision is better, but still has sensitivity to light/dark contrast   Household Mgmt: house is looking a little better since hiring a company to help her clear things/furniture out. That said, she herself has not done much of anything. Won't let people in her home except for one friend.    Cooking/Meal Preparation: will take her a whole afternoon to meal prep.   Shopping: was binge shopping for a while but has been able to stop herself from this habit. Has been able to make returns as well.   Appointment Mgmt: went back to using a chart to help her manage.   Employment: She states her work has been very understanding of her cognitive difficulties, but they also want her to move at a faster pace than she does. She has been talking with her bosses about restructuring her work so that she is participating in the activities she is good at (really good at trial and immediate, hot-fire problem-solving vs. reading and summarizing depositions). Really good at putting together trial binders, so her planning and organization skills are coming back but not completely. The other things she is going to do is move her job to part-time and go for her Masters of Divinity. Applying this month and would be starting in September. One program looking to see if she can start this summer. Looking to do hybrid of in-person and online.      Psychiatric/Neuropsychiatric Symptoms   Depression: still not great. Psychotherapy has been hard for her to get into. When she has wanted to do it, it's been hard to match up with someone. Has one therapist she is going to try. He does spiritual direction and pastoral counseling via teletherapy. No suicidal or homicidal ideation. Came off her depression medication for a good month around December. Cried the entire month. Taking Cymbalta and Wellbutrin again. Sleeps the entire weekends sometimes. Is more likely  "to not care for herself.   Marly/Hypomania: no  Anxiety/Stress: Can ruminate which interferes with her sleep. Stopped her Xanax cold turkey a while ago. No adverse effects, thankfully. When her brain is overwhelmed, she becomes psychologically overwhelmed and is not her best person.   Social Withdrawal: no  Neurovegetative Sxs:  Appetite: "weird" - will forget to eat sometimes. Keeping a log and watching her weight which has recently stabilized. Luncheon meat, cheese, greek yogurt, granola. Alex'ts it most weeks. Not overeating sweets like she once was. Knows she is not drinking enough water.   Sleep: either sleeps too much or doesn't sleep at all. If she gets interested in anything or gets overwhelmed by something, won't be able to sleep. Sleeps better with muscle relaxer because her shoulders and neck often don't relax.  Energy: can vary. Task initiation is a problem   Hallucinations: no  Delusional/Paranoid Thinking: no  Impulsivity: no  Obsessive/Compulsive Behaviors: no  Disinhibition: no  Irritability/Agitation: no  Aggression: no  Apathy/Indifference: Task initiation is a problem  Other changes in personality: no     Physical Functioning   Tremor: no  Difficulty walking: no  Imbalance: no  Falls: no  Weakness: no  Trouble with fine motor movements: no   Lightheadedness: no  Urinary Urgency: no  Sensory Sxs: vision is better.   Pain: pain from headaches, neck and shoulder pain which Cymbalta helps with.   Physical Exercise Routine: walking her dogs     Other Updates   Took Emgality which seemed to control her headaches but caused her to not notice normal warning signs with her body. Got a blood clot in her leg. She has recovered from this, but stopped taking Emgality. Would rather have the headaches than have no warning signs of other issues that may be occurring.   Has an upcoming appointment in neurology for her headaches with JULES Miller.  Hasn't seen a neurologist since Dr. Lin left.   Plans " "to try to start psychotherapy.   Believes she needs a psychiatrist.          OBJECTIVE     MENTAL STATUS AND OBSERVATIONS:   Appearance: Casually dressed and adequate grooming/hygiene.   Alertness: Attentive and alert.   Orientation:   O x 4 across both evaluation days   Gait:  Independent    Psychomotor:  Unremarkable   Handedness:  Right   Vision & Hearing:  She wore bifocal lenses on the day of testing. No trouble seeing test stimuli was reported or observed. Hearing was adequate for session   Speech/language: Normal in rate, rhythm, tone, and volume. No significant word finding difficulty observed. Comprehension was normal during the clinical interview. She asked for repetition and clarification of complex task instructions to ensure her comprehension during testing.    Mood/Affect:  The patients stated mood was "not so great" though affect was euthymic.   Interpersonal Behavior:  Rapport was quickly and easily established    Suicidality/Homicidality: Denied   Hallucinations/Delusions:  None evidenced or endorsed   Thought Content: Logical   Though Processes: Goal-directed   Insight & Judgment:  Appropriate   Participation in Interview:  Full     Procedures/Tests Administered    In addition to performing a review of pertinent medical records, reviewing limits to confidentiality, conducting a clinical interview, and explaining procedures, the following measures were administered by EV Freire, a trained psychometrician/psychometrist under the direct supervision of Dr. Ray: MSVT; Dot Counting (DCT); Test of Premorbid Functioning (TOPF); Wechsler Adult Intelligence Scale, Fourth Edition (WAIS-IV) [Digit Span, Arithmetic, and Symbol Search subtests]; Ruff 2 and 7 Selective Attention Test; Wechsler Memory Scale, Fourth Edition (WMS-IV) [Logical Memory subtest]; Foster Verbal Learning Test-Revised (HVLT-R; Form 1); Brief Visuospatial Memory Test-Revised (BVMT-R, form 1); Neuropsychological Assessment " "Battery (NAB) [Naming subtest, form 1]; Verbal fluency tests (FAS & animal naming; Macario et al., 2004 norms); Felice Complex Figure Test (RCFT) [copy only]; Trail Making Test, parts A and B (Macario et al., 2004 norms); [place] Card Sorting Test -64 card version (WCST-64); Grooved Pegboard Test (GPT, Macario et al., 2004 norms); Clarke Depression Inventory-Second Edition (BDI-2); Generalized Anxiety Disorder - 7 Item Scale (MANUEL-7); and PTSD Checklist for DSM5 (PCL-5). Manual norms were used unless otherwise indicated.      Test Taking Behavior and Validity   Ms. Lundy asked the examiner to speak at a lower volume. She often held her head and closed her eyes. She said, "It's so much easier this time, but I still have a headache." She persevered to the end of the evaluation. Scores on stand-alone and embedded performance validity measures were within normal limits. The current results, therefore, are likely an accurate reflection of the patient's current functioning.    Test Results      2025 Evaluation 2023 Evaluation     Raw Score Type of Standardized Score Standardized Score Percentile/CP Descriptor Standardized Score   MSVT  - - - - 100   MSVT  - - - - 100   MSVT Cons 100 - - - - 100   MSVT  - - - - 100   MSVT FR 75 - - - - 80   Dot Counting Escore 9 - - - - 21   ACS LM II Rec 28 - - - - 25   ACS RDS 9 - - - - 10   HVLT-R Recognition Discrimination 12 - - - - 12   PREMORBID FUNCTIONING Raw Score Type of Standardized Score Standardized Score Percentile/CP Descriptor Standardized Score   TOPF simple dem. eFSIQ -  79 High Average 112   TOPF pred. eFSIQ -  66 Average 106   TOPF simple + pred. eFSIQ -  73 Average 109   LANGUAGE FUNCTIONING Raw Score Type of Standardized Score Standardized Score Percentile/CP Descriptor Standardized Score   TOPF Word Reading 51  70 Average 108   NAB Naming 31 Tscore 53 62 Average 39   COWAT 28 Tscore 34 5 Below Average 30   Animal Naming 24 Tscore " 54 66 Average 35   VISUOSPATIAL FUNCTIONING Raw Score Type of Standardized Score Standardized Score Percentile/CP Descriptor Standardized Score   RCFT Copy 36 - - >16 WNL >16   RCFT Time to Copy 251 - - >16 WNL 2-5   BVMT-R Copy 12/12 - - - - 11/12   LEARNING & MEMORY Raw Score Type of Standardized Score Standardized Score Percentile/CP Descriptor Standardized Score   HVLT-R          Total Immediate (8, 11, 12) 31 Tscore 59 82 High Average 44   Delayed Recall 12 Tscore 61 86 High Average 50   Retention % 100 Tscore 55 69 Average 55   Hits 12 - - - - -   False Positives 0 - - - - -   Discrimination  12 Tscore 58 79 High Average 58   WMS-IV Subtests          LM I 33 ss 13 84 High Average 10   LM II 23 ss 11 63 Average 11   LM Recognition 28 - - >75 High Average 51-75 %ile   BVMT-R          IR (9, 10, 10) 29 Tscore 61 86 High Average 47   DR 11 Tscore 61 86 High Average 41   Discrimination Index 6 - - >16 WNL >16 %ile   ATTENTION/WORKING MEMORY Raw Score Type of Standardized Score Standardized Score Percentile/CP Descriptor Standardized Score   WAIS-IV WMI - SS 97 42 Average 100   WAIS-IV Digit Span 26 ss 10 50 Average 10         DS Forward 11 ss 11 63 Average 10         DS Backward 7 ss 8 25 Average 10         DS Sequence 8 ss 10 50 Average 9         Longest Digit Forward 7 - - - - -         Longest Digit Backward 5 - - - - -         Longest Digit Sequence 5 - - - - -   WAIS-IV Arithmetic 13 ss 9 37 Average 10   Ruff 2&7 Total Speed 87 Tscore 45 31 Average    Ruff 2&7 Total Accuracy 55 Tscore 27 1 Exceptionally Low     Ruff 2&7 Total Difference  18  0.01 - -    MENTAL PROCESSING SPEED Raw Score Type of Standardized Score Standardized Score Percentile/CP Descriptor Standardized Score   WAIS-IV PSI - SS 79 8 Below Average 65   WAIS-IV Symbol Search 19 ss 6 9 Low Average 4   WAIS-IV Coding 39 ss 6 9 Low Average 3   TMT A  45 Tscore 33 4 Below Average 18   TMT A Seq. Err. 1 - - - - -   EXECUTIVE FUNCTIONING Raw Score Type  of Standardized Score Standardized Score Percentile/CP Descriptor Standardized Score   TMT B 91 Tscore 37 10 Low Average 22   TMT B Set-Loss Err. 1 - - - - 0 (raw)   WCST-64          Total Correct 32 SS - - - -   Total Errors 32 SS 72 3 Below Average 74   Perseverative Resp. 6  58 Average 74   Perseverative Err. 6 SS 95 37 Average 76   Nonperseverative Err. 26 SS 59 0.3 Exceptionally Low  74   Concept. Level Response 10 SS 60 0.4 Exceptionally Low  67   Categories Completed 1 - - 6-10 Below Average -   FMS 0 - - - - -   Learning to Learn N/A - - - - -   FRONTOMOTOR  Raw Score Type of Standardized Score Standardized Score Percentile/CP Descriptor Standardized Score   GPT DH (Right) 109 Tscore 25 0.5 Exceptionally Low  20   GPT  Tscore 31 3 Below Average 22   MOOD & PERSONALITY Raw Score Type of Standardized Score Standardized Score Percentile/CP Descriptor Standardized Score   BDI-2 35 - - - Severe 29   MANUEL-7 7 - - - Mild 12   PCL-5 37 - - - Significant 45   ss = scaled score (mean = 10, SD = 3); SS = standard score (mean = 100, SD = 15); Tscore mean = 50, SD = 10; zscore (mean = 0.00, SD = 1); WNL = within normal limits; BNL = below normal limits    It is important to note that scores/percentiles should only be interpreted by a neuropsychologist. It is common for healthy individuals to have 1-3 isolated low/unusual scores that are not indicative of any significant cognitive dysfunction.       Billing  Code Description Minutes Units   96020 Psychiatric Interview 0    00850 Nubhvl xm phys/qhp 1st hr 0    47458 Nubhvl xm phy/qhp ea addl hr 0    41896 Psycl tst eval phys/qhp 1st 0    86534 Psycl tst eval phys/qhp ea 0    18053 Nrpsyc tst eval phys/qhp 1st 60 1   18210 Nrpsyc tst eval phys/qhp ea 97 2     Referral review/test selection 30      Tech consult/test review/modifications 10      Patient limitation management 0      Patient behavior management 0      Patient symptom monitoring 0      Record  Review/Integration/Report Generation 86      Face-to-Face interpretive Feedback 31    29679 Psycl/nrpsyc tst phy/qhp 1st 0    27778 Psycl/nrpsyc tst phy/qhp ea 0    04938 Psycl/nrpsyc tech 1st 30 1   62951 Psycl/nrpsyc tst tech ea 200 7

## 2025-02-26 DIAGNOSIS — N95.1 MENOPAUSAL VAGINAL DRYNESS: ICD-10-CM

## 2025-03-10 ENCOUNTER — DOCUMENTATION ONLY (OUTPATIENT)
Dept: REHABILITATION | Facility: HOSPITAL | Age: 55
End: 2025-03-10
Payer: COMMERCIAL

## 2025-03-10 NOTE — PROGRESS NOTES
No Show Note/Documentation    Patient: Yanelis Lundy  Date of Session: 3/10/2025  Diagnosis: No diagnosis found.  MRN: [id number]    Yanelis Lundy did not attend her scheduled speech therapy evaluation appointment today. She did not call to cancel nor reschedule.No charges have been posted today.     Cancel: 0  No show: 1    INGRID Cuellar, L-SLP,CCC-SLP, CBIS  Speech-Language Pathologist  Certified Brain Injury Specialist     3/10/2025

## 2025-03-12 ENCOUNTER — PATIENT MESSAGE (OUTPATIENT)
Dept: NEUROLOGY | Facility: CLINIC | Age: 55
End: 2025-03-12
Payer: COMMERCIAL

## 2025-03-20 ENCOUNTER — OFFICE VISIT (OUTPATIENT)
Dept: NEUROLOGY | Facility: CLINIC | Age: 55
End: 2025-03-20
Payer: COMMERCIAL

## 2025-03-20 DIAGNOSIS — F33.1 MODERATE EPISODE OF RECURRENT MAJOR DEPRESSIVE DISORDER: Primary | ICD-10-CM

## 2025-03-20 PROCEDURE — 99999 PR PBB SHADOW E&M-EST. PATIENT-LVL II: CPT | Mod: PBBFAC,,, | Performed by: CLINICAL NEUROPSYCHOLOGIST

## 2025-03-20 PROCEDURE — 99499 UNLISTED E&M SERVICE: CPT | Mod: S$GLB,,, | Performed by: CLINICAL NEUROPSYCHOLOGIST

## 2025-03-20 NOTE — PROGRESS NOTES
NEUROPSYCHOLOGICAL EVALUATION FEEDBACK    TELEMEDICINE DETAILS:   The patient location is: [place]  The chief complaint leading to consultation is: feedback regarding neuropsychological test results  Visit type: Virtual visit with synchronous audio and video  Total time spent with patient: 45  Each patient to whom he or she provides medical services by telemedicine is: (1) informed of the relationship between the physician and patient and the respective role of any other health care provider with respect to management of the patient; and (2) notified that he or she may decline to receive medical services by telemedicine and may withdraw from such care at any time.  Notes: See below.    Yanelis Lundy attended a feedback session today. She had forgotten about it but we were able to complete the session over the phone. We discussed the results of the neuropsychological evaluation and I gave time to discuss questions and concerns. For full evaluation details, please see the note from this provider dated 02/20/2025. A copy of the report was provided via [employer].     Additionally messaged about:   Psychiatry on the NS  Books on depression  Brain health information             Jessica Ray, PhD, ABPP  Board Certified in Clinical Neuropsychology   [facility] - Department of Neurology

## 2025-03-26 ENCOUNTER — PATIENT MESSAGE (OUTPATIENT)
Dept: NEUROLOGY | Facility: CLINIC | Age: 55
End: 2025-03-26
Payer: COMMERCIAL

## 2025-04-14 ENCOUNTER — OFFICE VISIT (OUTPATIENT)
Dept: OBSTETRICS AND GYNECOLOGY | Facility: CLINIC | Age: 55
End: 2025-04-14
Payer: COMMERCIAL

## 2025-04-14 ENCOUNTER — LAB VISIT (OUTPATIENT)
Dept: LAB | Facility: OTHER | Age: 55
End: 2025-04-14
Attending: NURSE PRACTITIONER
Payer: COMMERCIAL

## 2025-04-14 ENCOUNTER — PATIENT MESSAGE (OUTPATIENT)
Dept: OBSTETRICS AND GYNECOLOGY | Facility: CLINIC | Age: 55
End: 2025-04-14

## 2025-04-14 VITALS — WEIGHT: 143 LBS | DIASTOLIC BLOOD PRESSURE: 74 MMHG | SYSTOLIC BLOOD PRESSURE: 113 MMHG | BODY MASS INDEX: 25.33 KG/M2

## 2025-04-14 DIAGNOSIS — N87.9 CERVICAL DYSPLASIA: ICD-10-CM

## 2025-04-14 DIAGNOSIS — E55.9 VITAMIN D DEFICIENCY: ICD-10-CM

## 2025-04-14 DIAGNOSIS — Z90.710 STATUS POST TOTAL ABDOMINAL HYSTERECTOMY AND BILATERAL SALPINGO-OOPHORECTOMY (TAH-BSO): ICD-10-CM

## 2025-04-14 DIAGNOSIS — N95.1 MENOPAUSAL SYMPTOMS: Primary | ICD-10-CM

## 2025-04-14 DIAGNOSIS — M79.10 MUSCLE TENSION PAIN: ICD-10-CM

## 2025-04-14 DIAGNOSIS — N95.1 MENOPAUSAL SYMPTOMS: ICD-10-CM

## 2025-04-14 DIAGNOSIS — N76.0 ACUTE VAGINITIS: ICD-10-CM

## 2025-04-14 DIAGNOSIS — Z90.79 STATUS POST TOTAL ABDOMINAL HYSTERECTOMY AND BILATERAL SALPINGO-OOPHORECTOMY (TAH-BSO): ICD-10-CM

## 2025-04-14 DIAGNOSIS — Z87.411 HX VAGINAL DYSPLASIA: ICD-10-CM

## 2025-04-14 DIAGNOSIS — Z90.722 STATUS POST TOTAL ABDOMINAL HYSTERECTOMY AND BILATERAL SALPINGO-OOPHORECTOMY (TAH-BSO): ICD-10-CM

## 2025-04-14 LAB
25(OH)D3+25(OH)D2 SERPL-MCNC: 36 NG/ML (ref 30–96)
ESTRADIOL SERPL HS-MCNC: 13 PG/ML
FSH SERPL-ACNC: 99.04 MIU/ML

## 2025-04-14 PROCEDURE — 3074F SYST BP LT 130 MM HG: CPT | Mod: CPTII,S$GLB,, | Performed by: NURSE PRACTITIONER

## 2025-04-14 PROCEDURE — 36415 COLL VENOUS BLD VENIPUNCTURE: CPT

## 2025-04-14 PROCEDURE — 1160F RVW MEDS BY RX/DR IN RCRD: CPT | Mod: CPTII,S$GLB,, | Performed by: NURSE PRACTITIONER

## 2025-04-14 PROCEDURE — 99999 PR PBB SHADOW E&M-EST. PATIENT-LVL III: CPT | Mod: PBBFAC,,, | Performed by: NURSE PRACTITIONER

## 2025-04-14 PROCEDURE — 1159F MED LIST DOCD IN RCRD: CPT | Mod: CPTII,S$GLB,, | Performed by: NURSE PRACTITIONER

## 2025-04-14 PROCEDURE — 82670 ASSAY OF TOTAL ESTRADIOL: CPT

## 2025-04-14 PROCEDURE — 99214 OFFICE O/P EST MOD 30 MIN: CPT | Mod: S$GLB,,, | Performed by: NURSE PRACTITIONER

## 2025-04-14 PROCEDURE — 82306 VITAMIN D 25 HYDROXY: CPT

## 2025-04-14 PROCEDURE — 3008F BODY MASS INDEX DOCD: CPT | Mod: CPTII,S$GLB,, | Performed by: NURSE PRACTITIONER

## 2025-04-14 PROCEDURE — 83001 ASSAY OF GONADOTROPIN (FSH): CPT

## 2025-04-14 PROCEDURE — 3078F DIAST BP <80 MM HG: CPT | Mod: CPTII,S$GLB,, | Performed by: NURSE PRACTITIONER

## 2025-04-14 RX ORDER — METHOCARBAMOL 500 MG/1
500 TABLET, FILM COATED ORAL 2 TIMES DAILY PRN
Qty: 20 TABLET | Refills: 0 | Status: SHIPPED | OUTPATIENT
Start: 2025-04-14 | End: 2025-04-26

## 2025-04-14 RX ORDER — FLUCONAZOLE 150 MG/1
150 TABLET ORAL
Qty: 2 TABLET | Refills: 0 | Status: SHIPPED | OUTPATIENT
Start: 2025-04-14 | End: 2025-04-22

## 2025-04-14 NOTE — PROGRESS NOTES
Subjective:      Yanelis Lundy is a 55 y.o. female who is here for follow-up of hormone replacement therapy. She is SP ALLYN-BSO due to vaginal/cervical dysplasia and adenomyosis, performed at age 41. Current use of Compounded 20% Prometrium cream, 2 click daily, and Testin 1% testosterone gel, 1/2 pump daily. Increase to hot flashes and night sweats reported.     Hospital Outpatient Visit on 01/15/2025   Component Date Value Ref Range Status    Right GSJ vashti 01/15/2025 0.50  cm Final    Right GSMT vashti 01/15/2025 0.45  cm Final    Right GSDT vashti 01/15/2025 0.36  cm Final    Right GSDT reflux 01/15/2025 5,024.00  ms Final    Right GSK vashti 01/15/2025 0.37  cm Final    Right GSK reflux 01/15/2025 2,449.00  ms Final    Right GSPC vashti 01/15/2025 0.30  cm Final    Right Small Saphenous Knee Diameter 01/15/2025 0.40  cm Final    Right Small Saphenous SPJ Diameter 01/15/2025 0.20  cm Final    Left GSJ vashti 01/15/2025 0.57  cm Final    Left GSMT vashti 01/15/2025 0.50  cm Final    Left GSMT reflux 01/15/2025 1,553.00  ms Final    Left GSDT vashti 01/15/2025 0.32  cm Final    Left GSDT reflux 01/15/2025 2,295.00  ms Final    Left GSK vashti 01/15/2025 0.38  cm Final    Left GSK reflux 01/15/2025 2,655.00  ms Final    Left GSPC vashti 01/15/2025 0.30  cm Final    Left Small Saphenous Knee Diameter 01/15/2025 0.10  cm Final       Past Medical History:   Diagnosis Date    Anemia     Brain injury     PTSD (post-traumatic stress disorder)     TMJ (dislocation of temporomandibular joint)     Vaginal cancer      Past Surgical History:   Procedure Laterality Date    APPENDECTOMY      HYSTERECTOMY  2006 / BSO    MANDIBLE SURGERY       Social History[1]  Family History   Adopted: Yes     OB History    Para Term  AB Living   2    2    SAB IAB Ectopic Multiple Live Births   2          # Outcome Date GA Lbr Say/2nd Weight Sex Type Anes PTL Lv   2 SAB            1 SAB               Obstetric Comments   Early miscarries for both  pregnancies.       Current Medications[2]    Vitals:    04/14/25 1015   BP: 113/74   Weight: 64.9 kg (143 lb)     Body mass index is 25.33 kg/m².       Assessment:    Menopausal symptoms  -     Estradiol; Future; Expected date: 04/14/2025  -     Follicle Stimulating Hormone; Future; Expected date: 04/14/2025    Acute vaginitis  -     fluconazole (DIFLUCAN) 150 MG Tab; Take 1 tablet (150 mg total) by mouth Every 3 (three) days. for 2 doses  Dispense: 2 tablet; Refill: 0    Vitamin D deficiency  -     Vitamin D 25 hydroxy; Future; Expected date: 04/14/2025    Muscle tension pain  -     methocarbamoL (ROBAXIN) 500 MG Tab; Take 1 tablet (500 mg total) by mouth 2 (two) times daily as needed (muscle pain).  Dispense: 20 tablet; Refill: 0    Hx vaginal dysplasia    Cervical dysplasia    Status post total abdominal hysterectomy and bilateral salpingo-oophorectomy (ALLYN-BSO)        Plan:   Risks and benefits of hormone replacement therapy were discussed.  Hormone replacement therapy options, including bioidentical versus non-bioidentical hormones, as well as alternatives discussed.    Estradiol level, plans to restart Divigel after review of lab results. Vit D level.  Diflucan x 2 for reported vaginal itching  Robaxin refill for muscle pain, PCP for additional scripts.    Follow up in 3 months for WWE and HRT FU.  Instructed patient to call if she experiences any side effects or has any questions.       JULES Peraza-C           [1]   Social History  Tobacco Use    Smoking status: Never    Smokeless tobacco: Never   Substance Use Topics    Alcohol use: Yes     Comment: social    Drug use: Never   [2]   Current Outpatient Medications:     azelastine (ASTELIN) 137 mcg (0.1 %) nasal spray, 1 spray (137 mcg total) by Nasal route 2 (two) times daily as needed for Rhinitis., Disp: 30 mL, Rfl: 0    buPROPion (WELLBUTRIN XL) 300 MG 24 hr tablet, Take 1 tablet (300 mg total) by mouth once daily., Disp: 90 tablet, Rfl: 3     cetirizine (ZYRTEC) 10 MG tablet, Take 1 tablet (10 mg total) by mouth daily as needed for Rhinitis., Disp: 30 tablet, Rfl: 0    COMPOUND HORMONE REPLACEMENT, Take by mouth once daily. Progesterone Cream @ [facility], Disp: , Rfl:     DULoxetine (CYMBALTA) 30 MG capsule, Take 1 capsule (30 mg total) by mouth once daily., Disp: 90 capsule, Rfl: 3    DULoxetine (CYMBALTA) 60 MG capsule, Take 1 capsule (60 mg total) by mouth once daily., Disp: 90 capsule, Rfl: 3    fluticasone propionate (FLONASE) 50 mcg/actuation nasal spray, 2 sprays (100 mcg total) by Each Nostril route daily as needed for Rhinitis (nasal congestion)., Disp: 15.8 mL, Rfl: 0    prasterone, dhea, (INTRAROSA) 6.5 mg Inst, Place 6.5 mg vaginally every evening., Disp: 28 each, Rfl: 11    testosterone (FORTESTA) 10 mg/0.5 gram /actuation GlPm, Fortesta  EXTREMELY SMALL AMOUNT, SIZE OF A BURTON QD, Disp: , Rfl:     testosterone (TESTIM) 50 mg/5 gram (1 %) Gel, Apply topically once daily., Disp: , Rfl:     fluconazole (DIFLUCAN) 150 MG Tab, Take 1 tablet (150 mg total) by mouth Every 3 (three) days. for 2 doses, Disp: 2 tablet, Rfl: 0    methocarbamoL (ROBAXIN) 500 MG Tab, Take 1 tablet (500 mg total) by mouth 2 (two) times daily as needed (muscle pain)., Disp: 20 tablet, Rfl: 0

## 2025-04-17 DIAGNOSIS — N95.1 MENOPAUSAL SYMPTOMS: Primary | ICD-10-CM

## 2025-04-17 RX ORDER — ESTRADIOL 0.5 MG/.5G
1 GEL TOPICAL DAILY
Qty: 30 PACKET | Refills: 11 | Status: SHIPPED | OUTPATIENT
Start: 2025-04-17 | End: 2026-04-17

## 2025-05-05 ENCOUNTER — OFFICE VISIT (OUTPATIENT)
Dept: FAMILY MEDICINE | Facility: CLINIC | Age: 55
End: 2025-05-05
Payer: COMMERCIAL

## 2025-05-05 ENCOUNTER — PATIENT MESSAGE (OUTPATIENT)
Dept: OBSTETRICS AND GYNECOLOGY | Facility: CLINIC | Age: 55
End: 2025-05-05
Payer: COMMERCIAL

## 2025-05-05 DIAGNOSIS — J06.9 ACUTE URI: Primary | ICD-10-CM

## 2025-05-05 PROCEDURE — 1159F MED LIST DOCD IN RCRD: CPT | Mod: CPTII,95,,

## 2025-05-05 PROCEDURE — 1160F RVW MEDS BY RX/DR IN RCRD: CPT | Mod: CPTII,95,,

## 2025-05-05 PROCEDURE — 98004 SYNCH AUDIO-VIDEO EST SF 10: CPT | Mod: 95,,,

## 2025-05-05 RX ORDER — PROMETHAZINE HYDROCHLORIDE AND DEXTROMETHORPHAN HYDROBROMIDE 6.25; 15 MG/5ML; MG/5ML
SYRUP ORAL
Qty: 240 ML | Refills: 1 | Status: SHIPPED | OUTPATIENT
Start: 2025-05-05 | End: 2025-05-05

## 2025-05-05 RX ORDER — PROMETHAZINE HYDROCHLORIDE AND DEXTROMETHORPHAN HYDROBROMIDE 6.25; 15 MG/5ML; MG/5ML
10 SYRUP ORAL NIGHTLY PRN
Qty: 240 ML | Refills: 1 | Status: SHIPPED | OUTPATIENT
Start: 2025-05-05

## 2025-05-05 NOTE — PROGRESS NOTES
Subjective:       Patient ID: Ernie Porter is a 66 y.o. male.    Chief [facility]    HPI Comments: CHIEF COMPLAINT: physical     and currently .    HISTORY OF PRESENT ILLNESS: This is a 66-year-old white male who presents today  to f/u on diabetes and chronic health issues.    Due to lack of insurance, he has off all medications for the last 6 months.  He would like to resume his previous medication regimen.    HTN - previously tolerating valsartan 160mg and HCTZ 12.5mg daily  GERD - using Nexium only PRN  DM2 with neuropathy- previously tolerating metformin 1000mg BID and Ozempic 2mg weekly, Farxiga 10mg; following diabetic diet and exercising; home BGs 140s; getting feet pain at night with relief with Aleve. Taking Cymbalta 30mg to manage neuropathy  Following with endocrinology, [facility]  HLD - stopped Lipitor 10mg daily due to muscle pains/upset stomach, pravastatin caused muscle pains. He is not interested in taking any further statins; following low fat diet  Low Testerone -  testosterone stopped due to enlarged prostate and elevated PSA  BPH -was taking flomax daily      The 10-year ASCVD risk score (Natacha BETANCUR, et al., 2019) is: 30%    Values used to calculate the score:      Age: 66 years      Sex: Male      Is Non- : No      Diabetic: Yes      Tobacco smoker: No      Systolic Blood Pressure: 128 mmHg      Is BP treated: No      HDL Cholesterol: 48 mg/dL      Total Cholesterol: 218 mg/dL      PAST MEDICAL HISTORY:   hypertension  gastroesophageal reflux disease   DM2  testosterone deficiency  Kidney stones  HLD    PAST SURGICAL HISTORY: Tonsillectomy.     CURRENT MEDICATIONS: per [facility]    ALLERGIES: penicillin.     FAMILY HISTORY: Father  at 44 of pancreatic cancer. Mother is 73,   has high blood pressure. He has 1 brother and 1 sister who were healthy.     SOCIAL HISTORY: The patient is , works at construction  The patient location is: Patient Home  The chief complaint leading to consultation is: as below  Visit type:   Virtual visit with synchronous audio and vide    Total time spent with patient: 15 minutes   Each patient to whom he or she provides medical services by telemedicine is:  (1) informed of the relationship between the physician and patient and the respective role of any other health care provider with respect to management of the patient; and (2) notified that she may decline to receive medical services by telemedicine and may withdraw from such care at any time.      HPI     Yanelis Lundy is a 55 y.o. female with multiple medical diagnoses as listed in the medical history and problem list that presents for acute URI      Works as a     2 days of severe myalgia, arthralgia, fatigue, sinus congestion.  Yesterday, she developed a mildly productive cough (green sputum) - states it's a deep dry cough.    -- Denies any associated fever apart from 99-99.5.  No SOB at rest,  Denies any CP  -- Good relief with hot bathing  -- Cough IS interrupting her sleep      Review of Systems:  (as noted below)    Follow Up: With PCP PRN    Assessment & Plan     Acute URI  -     POCT Influenza A/B Molecular  -     promethazine-dextromethorphan (PROMETHAZINE-DM) 6.25-15 mg/5 mL Syrp; Take 10-15ml by mouth every 8 hours as needed for coughing  Dispense: 240 mL; Refill: 1  -     benzocaine-menthoL 15-3.6 mg Lozg; 1 lozenge by Mucous Membrane route every 2 (two) hours as needed (cough).  Dispense: 36 lozenge; Refill: 1        --------------------------------------------    Health Maintenance: Reviewed   Health Maintenance         Date Due Completion Date    Pneumococcal Vaccines (Age 50+) (1 of 1 - PCV) Never done ---    Shingles Vaccine (2 of 2) 01/11/2024 11/16/2023    Colorectal Cancer Screening 05/04/2024 5/4/2021    COVID-19 Vaccine (1 - 2024-25 season) Never done ---    Influenza Vaccine (Season Ended)  "management.   He does not smoke, and drinks 3 to 4 beers a week.     Review of Systems   Constitutional: Negative for fever, appetite change, fatigue and unexpected weight change.   HENT: Negative for ear pain, nosebleeds, congestion, sore throat, rhinorrhea, trouble swallowing, neck pain, postnasal drip, sinus pressure and ear discharge.    Eyes: Negative for pain, discharge, redness and visual disturbance.   Respiratory: Negative for apnea, cough, chest tightness, shortness of breath and wheezing.    Cardiovascular: Negative for chest pain, palpitations and leg swelling.   Gastrointestinal: Negative for nausea, vomiting, abdominal pain, diarrhea, constipation, blood in stool and abdominal distention.   Musculoskeletal: Negative for myalgias, back pain, joint swelling and gait problem.   Skin: Negative for color change, rash and wound.   Neurological: Negative for dizziness, tremors, seizures, syncope, speech difficulty, weakness, light-headedness, numbness and headaches.   Hematological: Negative for adenopathy. Does not bruise/bleed easily.   Psychiatric/Behavioral: Negative for suicidal ideas, hallucinations, behavioral problems and confusion.           Objective:      /64   Pulse 79   Ht 5' 10" (1.778 m)   Wt 90.6 kg (199 lb 11.8 oz)   SpO2 97%   BMI 28.66 kg/m²     Physical Exam   Constitutional: He is oriented to person, place, and time. He appears well-developed and well-nourished. He is active and cooperative.   Head: Normocephalic and atraumatic.   Mouth/Throat: Oropharynx is clear and moist. No oropharyngeal exudate.   Eyes: Conjunctivae normal and EOM are normal. Pupils are equal, round, and reactive to light. Right eye exhibits no discharge. Left eye exhibits no discharge. No scleral icterus.   Neck: Trachea normal, normal range of motion and full passive range of motion without pain. Neck supple. Normal carotid pulses and no JVD present. Carotid bruit is not present. No tracheal deviation " 09/01/2025 11/16/2023    Mammogram 10/07/2025 10/7/2024    Hemoglobin A1c (Diabetic Prevention Screening) 09/27/2027 9/27/2024    Lipid Panel 09/27/2029 9/27/2024    TETANUS VACCINE 08/07/2034 8/7/2024    RSV Vaccine (Age 60+ and Pregnant patients) (1 - 1-dose 75+ series) 03/19/2043 ---               Physical Exam   Limited 2/2 remote/virtual nature of the visit     General: NAD.    Respiratory: Breathing comfortably on RA  Neuro:  A&O4.  Normal speech.  No facial droop.    Psychiatric:  Appropriate affect.      History     Past Medical History:  Past Medical History:   Diagnosis Date    Anemia     Brain injury     PTSD (post-traumatic stress disorder)     TMJ (dislocation of temporomandibular joint)     Vaginal cancer        Past Surgical History:  Past Surgical History:   Procedure Laterality Date    APPENDECTOMY      HYSTERECTOMY  2006 TH / BSO    MANDIBLE SURGERY         Social History:  Social History[1]    Family History:  Family History   Adopted: Yes       Allergies and Medications: (updated and reviewed)  Review of patient's allergies indicates:   Allergen Reactions    Chicken derived Dermatitis, Hives, Itching, Other (See Comments) and Swelling    Blue dye Other (See Comments)    Penicillins Other (See Comments)     Current Medications[2]    Patient Care Team:  Peggy Dasilva,  as PCP - General (Family Medicine)       - The patient was sent an After Visit Summary virtually that lists all medications with directions, allergies, education, orders placed during this encounter and follow-up instructions.      - I have reviewed the patient's medical information including past medical, family, and social history sections including the medications and allergies.      - We discussed the patient's current medications.     This note was created by combination of typed  and MModal dictation.  Transcription errors may be present.  If there are any questions, please contact me.         Deonte Murillo,  LAKHWINDER  Family Medicine  [facility]                 [1]   Social History  Socioeconomic History    Marital status: Single   Tobacco Use    Smoking status: Never    Smokeless tobacco: Never   Substance and Sexual Activity    Alcohol use: Yes     Comment: social    Drug use: Never    Sexual activity: Yes     Partners: Male     Birth control/protection: See Surgical Hx     Social Drivers of Health     Financial Resource Strain: Low Risk  (5/5/2025)    Overall Financial Resource Strain (CARDIA)     Difficulty of Paying Living Expenses: Not very hard   Food Insecurity: No Food Insecurity (5/5/2025)    Hunger Vital Sign     Worried About Running Out of Food in the Last Year: Never true     Ran Out of Food in the Last Year: Never true   Transportation Needs: No Transportation Needs (5/5/2025)    PRAPARE - Transportation     Lack of Transportation (Medical): No     Lack of Transportation (Non-Medical): No   Physical Activity: Insufficiently Active (5/5/2025)    Exercise Vital Sign     Days of Exercise per Week: 3 days     Minutes of Exercise per Session: 20 min   Stress: Stress Concern Present (5/5/2025)    [employer] - Occupational Stress Questionnaire     Feeling of Stress : To some extent   Housing Stability: Low Risk  (5/5/2025)    Housing Stability Vital Sign     Unable to Pay for Housing in the Last Year: No     Number of Times Moved in the Last Year: 0     Homeless in the Last Year: No   [2]   Current Outpatient Medications   Medication Sig Dispense Refill    azelastine (ASTELIN) 137 mcg (0.1 %) nasal spray 1 spray (137 mcg total) by Nasal route 2 (two) times daily as needed for Rhinitis. 30 mL 0    benzocaine-menthoL 15-3.6 mg Lozg 1 lozenge by Mucous Membrane route every 2 (two) hours as needed (cough). 36 lozenge 1    buPROPion (WELLBUTRIN XL) 300 MG 24 hr tablet Take 1 tablet (300 mg total) by mouth once daily. 90 tablet 3    cetirizine (ZYRTEC) 10 MG tablet Take 1  present. No mass and no thyromegaly present.   Cardiovascular: Normal rate, regular rhythm, S1 normal, S2 normal, normal heart sounds and intact distal pulses.  Exam reveals no gallop and no friction rub.  No murmur heard.  Pulses:       Carotid pulses are 2+ on the right side, and 2+ on the left side.       Radial pulses are 2+ on the right side, and 2+ on the left side.        Dorsalis pedis pulses are 2+ on the right side, and 2+ on the left side.   Pulmonary/Chest: Effort normal and breath sounds normal. No respiratory distress. He has no wheezes. He has no rales.   Abdominal: Soft. Normal appearance and bowel sounds are normal. He exhibits no distension and no mass. There is no hepatosplenomegaly. There is no tenderness. There is no rebound and no guarding.   Genitourinary: Testes normal.   Musculoskeletal: Normal range of motion. He exhibits no edema and no tenderness.   Lymphadenopathy:        Head (right side): No tonsillar adenopathy present.        Head (left side): No tonsillar adenopathy present.     He has no cervical adenopathy.   Neurological: He is alert and oriented to person, place, and time. He has normal strength. No cranial nerve deficit. Coordination normal.   Skin: Skin is warm, dry and intact. No rash noted. No cyanosis or erythema. No pallor. Nails show no clubbing.   Psychiatric: He has a normal mood and affect. His speech is normal and behavior is normal. Judgment and thought content normal.   Erythematous patches in right axilla      Results for orders placed or performed in visit on 11/25/24   SARS Coronavirus 2 Antigen, POCT Manual Read    Collection Time: 11/25/24  5:50 PM   Result Value Ref Range    SARS Coronavirus 2 Antigen Negative Negative     Acceptable Yes    POCT Influenza A/B MOLECULAR    Collection Time: 11/25/24  5:50 PM   Result Value Ref Range    POC Molecular Influenza A Ag Negative Negative    POC Molecular Influenza B Ag Negative Negative    Quality  Control Acceptable Yes        Assessment:       1. Statin myopathy    2. Type 2 diabetes mellitus with hyperglycemia, without long-term current use of insulin    3. Type 2 diabetes mellitus with diabetic polyneuropathy, without long-term current use of insulin    4. Gastroesophageal reflux disease, unspecified whether esophagitis present    5. Benign prostatic hyperplasia, unspecified whether lower urinary tract symptoms present              Plan:       Statin myopathy    Type 2 diabetes mellitus with hyperglycemia, without long-term current use of insulin  -     dapagliflozin propanediol (FARXIGA) 10 mg tablet; Take 1 tablet (10 mg total) by mouth Daily.  Dispense: 90 tablet; Refill: 2  -     metFORMIN (GLUCOPHAGE) 1000 MG tablet; Take 1 tablet (1,000 mg total) by mouth 2 (two) times daily with meals.  Dispense: 180 tablet; Refill: 2  -     Hemoglobin A1C; Future; Expected date: 05/05/2025  -     Comprehensive Metabolic Panel; Future; Expected date: 05/05/2025  -     Lipid Panel; Future; Expected date: 05/05/2025  -     semaglutide (OZEMPIC) 0.25 mg or 0.5 mg (2 mg/3 mL) pen injector; Inject 0.25 mg into the skin every 7 days.  Dispense: 3 mL; Refill: 2  -     Microalbumin/Creatinine Ratio, Urine; Future; Expected date: 05/05/2025    Type 2 diabetes mellitus with diabetic polyneuropathy, without long-term current use of insulin  -     DULoxetine (CYMBALTA) 30 MG capsule; Take 1 capsule (30 mg total) by mouth once daily.  Dispense: 90 capsule; Refill: 2    Gastroesophageal reflux disease, unspecified whether esophagitis present  -     esomeprazole (NEXIUM) 40 MG capsule; Take 1 capsule (40 mg total) by mouth daily as needed.  Dispense: 30 capsule; Refill: 0    Benign prostatic hyperplasia, unspecified whether lower urinary tract symptoms present  -     tamsulosin (FLOMAX) 0.4 mg Cap; Take 1 capsule (0.4 mg total) by mouth once daily.  Dispense: 90 capsule; Refill: 3        Labs today   Resume previous medication  tablet (10 mg total) by mouth daily as needed for Rhinitis. 30 tablet 0    COMPOUND HORMONE REPLACEMENT Take by mouth once daily. Progesterone Cream @ [facility]      DULoxetine (CYMBALTA) 30 MG capsule Take 1 capsule (30 mg total) by mouth once daily. 90 capsule 3    DULoxetine (CYMBALTA) 60 MG capsule Take 1 capsule (60 mg total) by mouth once daily. 90 capsule 3    estradioL (DIVIGEL) 0.5 mg/0.5 gram (0.1 %) GlPk Place 1 packet onto the skin once daily. 30 packet 11    fluticasone propionate (FLONASE) 50 mcg/actuation nasal spray 2 sprays (100 mcg total) by Each Nostril route daily as needed for Rhinitis (nasal congestion). 15.8 mL 0    prasterone, dhea, (INTRAROSA) 6.5 mg Inst Place 6.5 mg vaginally every evening. 28 each 11    promethazine-dextromethorphan (PROMETHAZINE-DM) 6.25-15 mg/5 mL Syrp Take 10-15ml by mouth every 8 hours as needed for coughing 240 mL 1    testosterone (FORTESTA) 10 mg/0.5 gram /actuation GlPm Fortesta   EXTREMELY SMALL AMOUNT, SIZE OF A BURTON QD      testosterone (TESTIM) 50 mg/5 gram (1 %) Gel Apply topically once daily.       No current facility-administered medications for this visit.      regimen  Continue present diabetic meds  Work on weight loss and diabetic diet  Counseled on regular exercise, maintenance of a healthy weight, balanced diet rich in fruits/vegetables and lean protein, and avoidance of unhealthy habits like smoking and excessive alcohol intake.  F/u 3 months with labs    Visit today included increased complexity associated with the care of the episodic problems listed above addressed and managing the longitudinal care of the patient due to the serious and/or complex managed problem(s) listed above.

## 2025-05-05 NOTE — LETTER
May 5, 2025      [facility]  [address] [facility]  [place] [address]  Phone: [phone]  Fax: [phone]       Patient: Yanelis Lundy   YOB: 1970  Date of Visit: 05/05/2025    To Whom It May Concern:    DARCIE Lundy  was at [facility] on 05/05/2025. The patient may return to work on 5/8/2025 with no restrictions.     If you have any questions or concerns, or if I can be of further assistance, please do not hesitate to contact me.    Sincerely,    Deonte Murillo PA-C

## 2025-05-05 NOTE — PATIENT INSTRUCTIONS
Thanks for seeing me today.  Please let me know if you have any questions about your treatment plan as outlined below:    Will call you regarding flu swab instructions    UPPER RESPIRATORY INFECTION:    - Rest  - Hand washing and cough hygiene (cough into your shirt or elbow) will help prevent the spread of the illness.    - Increase fluids:  You body needs increased water, but other beverages may aid in comfort.  You will know when you have had enough fluid when your urine appears clear, or at least a very pale yellow.  Get some hot ryan or similar soup!  - Generally, you are no longer contagious once you have been without a fever for over 24 hours (without the use of fever reducing medications)  - Note that a cough (even one productive of clear/yellow sputum) may linger even after you have successfully cleared the initial infection.   - Please don't hesitate to reach out if your symptoms do not improve or worsen after approximately 1 week.        SORE THROAT / COUGH:     - Benzocaine lozenges (i.e. Cepacol) can greatly help to ease a sore throat  - Hot tea with honey can help soothe sore throat.  Honey (even just taking a spoonful) can also ease coughs.  - Prescription cough medicine should be used only at night to aid in sleep.  Coughing during the day is the body's way of removing the infectious agent.       BODY ACHES / HEADACHE  - Ibuprofen is preferred for aches and pains, as well as for fever reduction - 600 every 6 hours is a safe and effective dose.   - May alternate Tylenol (approximately 650 mg) and Ibuprofen every 4-6 hours if discomfort is severe      * * *  Seek Emergency Care if ... * * *   Severe shortness of breath that does not improve with usual medications  Oxygen saturation falls below 88%  New or worsening confusion, dizziness, or extreme fatigue  Bluish lips, fingers, or skin (cyanosis)  Chest pain or tightness that feels severe or unusual  You cannot speak in full sentences or feel unable to  stay awake  Unable to urinate or severely decreased urine output  New unexplained rash  Fever over 104  Severe headache or neck stiffness, especially if accompanied by fever or sensitivity to ligh  Any sudden or severe worsening of symptoms not listed above which feels life-threatening

## 2025-05-12 ENCOUNTER — OFFICE VISIT (OUTPATIENT)
Dept: CARDIOLOGY | Facility: CLINIC | Age: 55
End: 2025-05-12
Payer: COMMERCIAL

## 2025-05-12 VITALS
HEART RATE: 76 BPM | BODY MASS INDEX: 26.13 KG/M2 | WEIGHT: 147.5 LBS | SYSTOLIC BLOOD PRESSURE: 112 MMHG | HEIGHT: 63 IN | DIASTOLIC BLOOD PRESSURE: 74 MMHG

## 2025-05-12 DIAGNOSIS — R60.0 EDEMA OF RIGHT LOWER EXTREMITY: Primary | ICD-10-CM

## 2025-05-12 DIAGNOSIS — I87.2 VENOUS INSUFFICIENCY OF BOTH LOWER EXTREMITIES: ICD-10-CM

## 2025-05-12 PROCEDURE — 3008F BODY MASS INDEX DOCD: CPT | Mod: CPTII,S$GLB,, | Performed by: INTERNAL MEDICINE

## 2025-05-12 PROCEDURE — 1159F MED LIST DOCD IN RCRD: CPT | Mod: CPTII,S$GLB,, | Performed by: INTERNAL MEDICINE

## 2025-05-12 PROCEDURE — 99214 OFFICE O/P EST MOD 30 MIN: CPT | Mod: S$GLB,,, | Performed by: INTERNAL MEDICINE

## 2025-05-12 PROCEDURE — 99999 PR PBB SHADOW E&M-EST. PATIENT-LVL IV: CPT | Mod: PBBFAC,,, | Performed by: INTERNAL MEDICINE

## 2025-05-12 PROCEDURE — 3078F DIAST BP <80 MM HG: CPT | Mod: CPTII,S$GLB,, | Performed by: INTERNAL MEDICINE

## 2025-05-12 PROCEDURE — 3074F SYST BP LT 130 MM HG: CPT | Mod: CPTII,S$GLB,, | Performed by: INTERNAL MEDICINE

## 2025-05-12 NOTE — PROGRESS NOTES
[facility]      Chief Complaint   Patient presents with    edema of right lower extremity       Patient ID: Yanelis Lundy is a 54 y.o. female with venous insufficiency, PTSD, brain injury, who presents for a follow up appointment. Pertinent history/events are as follows:     - Marcos Carbone is referring this patient for evaluation of Lymphedema of left leg.    -At our initial clinic visit on 1/7/2025, Ms. Lundy reports right leg swelling which started in 11/2024. States swelling has improved. BLE Venous Ultrasound on 12/6/2024 revealed no right lower extremity DVT seen. BLE Arterial Ultrasound on 12/6/2024 showed no focal hemodynamically significant stenosis 50% or greater right lower extremity seen   Plan:  Right Leg Swelling- Likely due to a component of venous insufficiency. Check BLE venous reflux study. BLE Venous Ultrasound on 12/6/2024 revealed no right lower extremity DVT seen. BLE Arterial Ultrasound on 12/6/2024 showed no focal hemodynamically significant stenosis 50% or greater right lower extremity seen. Recommend wearing graduated compression hose.  Limit sodium intake to 2000 mg daily.  Limit volume intake to 1.5 L daily.  Elevate legs when resting.    HPI:  Ms. Lundy reports significant improvement in right leg swelling. She has no claudication or tissue loss.  BLE Venous Reflux Study on 1/15/2025 revealed bilateral GSV reflux and no DVT.  A Baker's cyst measuring 1.5 cm x 0.3 cm was seen in the left extremity.    Past Medical History:   Diagnosis Date    Anemia     Brain injury     PTSD (post-traumatic stress disorder)     TMJ (dislocation of temporomandibular joint)     Vaginal cancer      Past Surgical History:   Procedure Laterality Date    APPENDECTOMY      HYSTERECTOMY  2006 TH / BSO    MANDIBLE SURGERY       Social History     Socioeconomic History    Marital status: Single   Tobacco Use    Smoking status: Never    Smokeless tobacco: Never   Substance and Sexual Activity     Alcohol use: Yes     Comment: social    Drug use: Never    Sexual activity: Yes     Partners: Male     Birth control/protection: See Surgical Hx     Social Drivers of Health     Financial Resource Strain: Low Risk  (5/5/2025)    Overall Financial Resource Strain (CARDIA)     Difficulty of Paying Living Expenses: Not very hard   Food Insecurity: No Food Insecurity (5/5/2025)    Hunger Vital Sign     Worried About Running Out of Food in the Last Year: Never true     Ran Out of Food in the Last Year: Never true   Transportation Needs: No Transportation Needs (5/5/2025)    PRAPARE - Transportation     Lack of Transportation (Medical): No     Lack of Transportation (Non-Medical): No   Physical Activity: Insufficiently Active (5/5/2025)    Exercise Vital Sign     Days of Exercise per Week: 3 days     Minutes of Exercise per Session: 20 min   Stress: Stress Concern Present (5/5/2025)    Citizen of [place] [employer] - Occupational Stress Questionnaire     Feeling of Stress : To some extent   Housing Stability: Low Risk  (5/5/2025)    Housing Stability Vital Sign     Unable to Pay for Housing in the Last Year: No     Number of Times Moved in the Last Year: 0     Homeless in the Last Year: No     Family History   Adopted: Yes       Review of patient's allergies indicates:   Allergen Reactions    Chicken derived Dermatitis, Hives, Itching, Other (See Comments) and Swelling    Blue dye Other (See Comments)    Penicillins Other (See Comments)       Medication List with Changes/Refills   Current Medications    AZELASTINE (ASTELIN) 137 MCG (0.1 %) NASAL SPRAY    1 spray (137 mcg total) by Nasal route 2 (two) times daily as needed for Rhinitis.    BENZOCAINE-MENTHOL 15-3.6 MG LOZG    1 lozenge by Mucous Membrane route every 2 (two) hours as needed (cough).    BUPROPION (WELLBUTRIN XL) 300 MG 24 HR TABLET    Take 1 tablet (300 mg total) by mouth once daily.    CETIRIZINE (ZYRTEC) 10 MG TABLET    Take 1 tablet (10 mg  "total) by mouth daily as needed for Rhinitis.    COMPOUND HORMONE REPLACEMENT    Take by mouth once daily. Progesterone Cream @ [facility]    DULOXETINE (CYMBALTA) 30 MG CAPSULE    Take 1 capsule (30 mg total) by mouth once daily.    DULOXETINE (CYMBALTA) 60 MG CAPSULE    Take 1 capsule (60 mg total) by mouth once daily.    ESTRADIOL (DIVIGEL) 0.5 MG/0.5 GRAM (0.1 %) GLPK    Place 1 packet onto the skin 2 (two) times a day.    FLUTICASONE PROPIONATE (FLONASE) 50 MCG/ACTUATION NASAL SPRAY    2 sprays (100 mcg total) by Each Nostril route daily as needed for Rhinitis (nasal congestion).    PRASTERONE, DHEA, (INTRAROSA) 6.5 MG INST    Place 6.5 mg vaginally every evening.    PROMETHAZINE-DEXTROMETHORPHAN (PROMETHAZINE-DM) 6.25-15 MG/5 ML SYRP    Take 10 mLs by mouth nightly as needed (cough). Take ONLY at bedtime, causes drowsiness.  No driving or using heavy machinery while taking.  Please exercise caution as it can increases your chance of falls (due to somnolence)    TESTOSTERONE (FORTESTA) 10 MG/0.5 GRAM /ACTUATION GLPM    Fortesta   EXTREMELY SMALL AMOUNT, SIZE OF A BURTON QD   Discontinued Medications    TESTOSTERONE (TESTIM) 50 MG/5 GRAM (1 %) GEL    Apply topically once daily.       Review of Systems  Constitution: Denies chills, fever, and sweats.  HENT: Denies headaches or blurry vision.  Cardiovascular: Denies chest pain or irregular heart beat.  Respiratory: Denies cough or shortness of breath.  Gastrointestinal: Denies abdominal pain, nausea, or vomiting.  Musculoskeletal: Denies muscle cramps.  Neurological: Denies dizziness or focal weakness.  Psychiatric/Behavioral: Normal mental status.  Hematologic/Lymphatic: Denies bleeding problem or easy bruising/bleeding.  Skin: Denies rash or suspicious lesions    Physical Examination  /74   Pulse 76   Ht 5' 3" (1.6 m)   Wt 66.9 kg (147 lb 7.8 oz)   LMP 01/01/2006 (Approximate) Comment: TH / BSO  2006  BMI 26.13 kg/m²     Constitutional: No " "acute distress, conversant  HEENT: Sclera anicteric, Pupils equal, round and reactive to light, extraocular motions intact, Oropharynx clear  Neck: No JVD, no carotid bruits  Cardiovascular: regular rate and rhythm, no murmur, rubs or gallops, normal S1/S2  Pulmonary: Clear to auscultation bilaterally  Abdominal: Abdomen soft, nontender, nondistended, positive bowel sounds  Extremities: No lower extremity edema,   Pulses:  Carotid pulses are 2+ on the right side, and 2+ on the left side.  Radial pulses are 2+ on the right side, and 2+ on the left side.   Femoral pulses are 2+ on the right side, and 2+ on the left side.  Popliteal pulses are 2+ on the right side, and 2+ on the left side.   Dorsalis pedis pulses are 2+ on the right side, and 2+ on the left side.   Posterior tibial pulses are 2+ on the right side, and 2+ on the left side.    Skin: No ecchymosis, erythema, or ulcers  Psych: Alert and oriented x 3, appropriate affect  Neuro: CNII-XII intact, no focal deficits    Labs:  Most Recent Data  CBC:   Lab Results   Component Value Date    WBC 5.22 12/06/2024    HGB 15.2 12/06/2024    HCT 45.6 12/06/2024     12/06/2024    MCV 93 12/06/2024    RDW 12.2 12/06/2024     BMP:   Lab Results   Component Value Date     12/06/2024    K 4.2 12/06/2024     12/06/2024    CO2 24 12/06/2024    BUN 15 12/06/2024    CREATININE 0.8 12/06/2024    GLU 92 12/06/2024    CALCIUM 9.6 12/06/2024     LFTS;   Lab Results   Component Value Date    PROT 7.7 12/06/2024    ALBUMIN 4.3 12/06/2024    BILITOT 0.6 12/06/2024    AST 19 12/06/2024    ALKPHOS 74 12/06/2024    ALT 19 12/06/2024     COAGS: No results found for: "INR", "PROTIME", "PTT"  FLP:   Lab Results   Component Value Date    CHOL 173 09/27/2024    HDL 73 09/27/2024    LDLCALC 86.6 09/27/2024    TRIG 67 09/27/2024    CHOLHDL 42.2 09/27/2024     CARDIAC: No results found for: "TROPONINI", "CKTOTAL", "CKMB", "BNP"    Imaging:    BLE Venous Reflux Study " 1/15/2025:    There is no evidence of a right lower extremity DVT.    There is no evidence of a left lower extremity DVT.    The right greater saphenous vein has reflux.    The left greater saphenous vein has reflux.    A Baker's cyst measuring 1.5 cm x 0.3 cm was seen in the left extremity.    BLE Venous Ultrasound 12/6/2024:  No right lower extremity DVT seen.     BLE Arterial Ultrasound 12/6/2024:   No focal hemodynamically significant stenosis 50% or greater right lower extremity seen     Assessment/Plan  Yanelis Lundy is a 54 y.o. female with venous insufficiency, PTSD, brain injury, who presents for a follow up appointment.    Right Leg Swelling- Due to venous insufficiency. Now significantly improved. BLE Venous Reflux Study on 1/15/2025 revealed bilateral GSV reflux and no DVT.  A Baker's cyst measuring 1.5 cm x 0.3 cm was seen in the left extremity. BLE Arterial Ultrasound on 12/6/2024 showed no focal hemodynamically significant stenosis 50% or greater right lower extremity seen. Recommend wearing graduated compression hose.  Limit sodium intake to 2000 mg daily.  Limit volume intake to 1.5 L daily.  Elevate legs when resting.    Follow up in 6 months    Total duration of face to face visit time 15 minutes.  Total time spent counseling greater than fifty percent of total visit time.  Counseling included discussion regarding imaging findings, diagnosis, possibilities, treatment options, risks and benefits.  The patient had many questions regarding the options and long-term effects.    Julius Phelps MD, PhD  Interventional Cardiology

## 2025-05-12 NOTE — PATIENT INSTRUCTIONS
-Continue graduated compression hose.   -Limit sodium intake to 2000 mg daily.   -Elevate legs when resting.   -Follow up in 6 months

## 2025-05-19 ENCOUNTER — PATIENT MESSAGE (OUTPATIENT)
Dept: OBSTETRICS AND GYNECOLOGY | Facility: CLINIC | Age: 55
End: 2025-05-19
Payer: COMMERCIAL

## 2025-06-13 ENCOUNTER — PATIENT MESSAGE (OUTPATIENT)
Dept: OBSTETRICS AND GYNECOLOGY | Facility: CLINIC | Age: 55
End: 2025-06-13
Payer: COMMERCIAL

## 2025-06-24 ENCOUNTER — TELEPHONE (OUTPATIENT)
Dept: OBSTETRICS AND GYNECOLOGY | Facility: CLINIC | Age: 55
End: 2025-06-24

## 2025-06-24 DIAGNOSIS — N95.1 MENOPAUSAL SYMPTOMS: Primary | ICD-10-CM

## 2025-06-24 RX ORDER — ESTRADIOL 1.25 MG/1.25G
1.25 GEL TOPICAL DAILY
Qty: 37.5 G | Refills: 11 | Status: SHIPPED | OUTPATIENT
Start: 2025-06-24 | End: 2026-06-24

## 2025-06-24 NOTE — TELEPHONE ENCOUNTER
Attempted virtual visit with major tech issues.     Desires increase to Divigel with plans to divide dose in half with use twice daily. Divigel 1.25 mg Glpk daily sent. Will assist to reschedule for virtual visit.

## 2025-06-27 ENCOUNTER — TELEPHONE (OUTPATIENT)
Dept: OBSTETRICS AND GYNECOLOGY | Facility: CLINIC | Age: 55
End: 2025-06-27
Payer: COMMERCIAL

## 2025-06-27 NOTE — TELEPHONE ENCOUNTER
----- Message from Ania Carrasco NP sent at 6/24/2025 12:44 PM CDT -----  Needs a virtual reschedule, tech issue

## 2025-07-22 ENCOUNTER — PATIENT MESSAGE (OUTPATIENT)
Dept: OBSTETRICS AND GYNECOLOGY | Facility: CLINIC | Age: 55
End: 2025-07-22
Payer: COMMERCIAL

## 2025-08-12 ENCOUNTER — OFFICE VISIT (OUTPATIENT)
Dept: OBSTETRICS AND GYNECOLOGY | Facility: CLINIC | Age: 55
End: 2025-08-12
Payer: COMMERCIAL

## 2025-08-12 VITALS
HEIGHT: 63 IN | SYSTOLIC BLOOD PRESSURE: 111 MMHG | BODY MASS INDEX: 26.58 KG/M2 | DIASTOLIC BLOOD PRESSURE: 63 MMHG | HEART RATE: 70 BPM | WEIGHT: 150 LBS

## 2025-08-12 DIAGNOSIS — N95.1 MENOPAUSAL SYMPTOMS: Primary | ICD-10-CM

## 2025-08-12 DIAGNOSIS — E66.3 OVERWEIGHT: ICD-10-CM

## 2025-08-12 PROCEDURE — 1160F RVW MEDS BY RX/DR IN RCRD: CPT | Mod: CPTII,S$GLB,, | Performed by: PHYSICIAN ASSISTANT

## 2025-08-12 PROCEDURE — 99214 OFFICE O/P EST MOD 30 MIN: CPT | Mod: S$GLB,,, | Performed by: PHYSICIAN ASSISTANT

## 2025-08-12 PROCEDURE — 3078F DIAST BP <80 MM HG: CPT | Mod: CPTII,S$GLB,, | Performed by: PHYSICIAN ASSISTANT

## 2025-08-12 PROCEDURE — 1159F MED LIST DOCD IN RCRD: CPT | Mod: CPTII,S$GLB,, | Performed by: PHYSICIAN ASSISTANT

## 2025-08-12 PROCEDURE — 99999 PR PBB SHADOW E&M-EST. PATIENT-LVL III: CPT | Mod: PBBFAC,,, | Performed by: PHYSICIAN ASSISTANT

## 2025-08-12 PROCEDURE — 3074F SYST BP LT 130 MM HG: CPT | Mod: CPTII,S$GLB,, | Performed by: PHYSICIAN ASSISTANT

## 2025-08-12 PROCEDURE — 3008F BODY MASS INDEX DOCD: CPT | Mod: CPTII,S$GLB,, | Performed by: PHYSICIAN ASSISTANT
